# Patient Record
Sex: MALE | Race: WHITE | Employment: OTHER | URBAN - METROPOLITAN AREA
[De-identification: names, ages, dates, MRNs, and addresses within clinical notes are randomized per-mention and may not be internally consistent; named-entity substitution may affect disease eponyms.]

---

## 2016-12-27 NOTE — PATIENT INSTRUCTIONS
Preventive Health Recommendations  Male Ages 50   64    *   Call about seeing the sleep specialist: [phone].     *   Blood tests for blood sugar, kidney function, cholesterol.     *   The colonoscopy is due 2021.     *   Next step for the shoulder MRI. Let me know.     *   Follow up 3 months.     *   I'd like to see you health move in a better direction. It's time.       Yearly exam:             See your health care provider every year in order to  o   Review health changes.   o   Discuss preventive care.    o   Review your medicines if your doctor has prescribed any.     Have a cholesterol test every 5 years, or more frequently if you are at risk for high cholesterol/heart disease.     Have a diabetes test (fasting glucose) every three years. If you are at risk for diabetes, you should have this test more often.     Have a colonoscopy at age 50, or have a yearly FIT test (stool test). These exams will check for colon cancer.      Talk with your health care provider about whether or not a prostate cancer screening test (PSA) is right for you.    You should be tested each year for STDs (sexually transmitted diseases), if you re at risk.     Shots: Get a flu shot each year. Get a tetanus shot every 10 years.     Nutrition:    Eat at least 5 servings of fruits and vegetables daily.     Eat whole-grain bread, whole-wheat pasta and brown rice instead of white grains and rice.     Talk to your provider about Calcium and Vitamin D.     Lifestyle    Exercise for at least 150 minutes a week (30 minutes a day, 5 days a week). This will help you control your weight and prevent disease.     Limit alcohol to one drink per day.     No smoking.     Wear sunscreen to prevent skin cancer.     See your dentist every six months for an exam and cleaning.     See your eye doctor every 1 to 2 years.

## 2016-12-27 NOTE — PROGRESS NOTES
SUBJECTIVE:     CC: Bi Bishop is an 56 year old male who presents for preventative health visit.     Healthy Habits:  Answers for HPI/ROS submitted by the patient on 1/1/2017   Annual Exam:  Getting at least 3 servings of Calcium per day:: NO  Bi-annual eye exam:: NO  Dental care twice a year:: NO  Sleep apnea or symptoms of sleep apnea:: Daytime drowsiness, Excessive snoring, Sleep apnea  Diet:: Regular (no restrictions)  Frequency of exercise:: None  Taking medications regularly:: Yes  Medication side effects:: None  PHQ-2 Depressed: Not at all, Not at all  PHQ-2 Score: 0        Medication Followup of erectile dysfunction  Levitra 10-20mg qd PRN    Taking Medication as prescribed: yes    Side Effects:  None    Medication Helping Symptoms:  yes     Medication Followup of esophageal reflux  Omeprazole 20mg qd    Taking Medication as prescribed: yes    Side Effects:  None    Medication Helping Symptoms:  yes       Hypertension Follow-up  Amlodipine 10mg qd, losartan 100mg qd    Outpatient blood pressures are not being checked.    Low Salt Diet: not monitoring salt     Asthma Follow-Up  Advair 250-50mcg/ACT BID, albuterol 108mcg/ACT 2 puffs q6h PRN  Was ACT completed today?    Yes    ACT Total Scores 11/16/2015   ACT TOTAL SCORE -   ASTHMA ER VISITS -   ASTHMA HOSPITALIZATIONS -   ACT TOTAL SCORE (Goal Greater than or Equal to 20) 19   In the past 12 months, how many times did you visit the emergency room for your asthma without being admitted to the hospital? 0   In the past 12 months, how many times were you hospitalized overnight because of your asthma? 0       - Patient says that his knee is doing good when he limits his activities    - BP is elevated today but he didn't take his medication this morning    - His weight is also higher today, but he says he checks it at home and he has been fairly consistent at 328 pounds.      Today's PHQ-2 Score:   PHQ-2 ( 1999 [employer]) 6/3/2015 4/4/2014   Q1: Little interest  or pleasure in doing things 0 0   Q2: Feeling down, depressed or hopeless 0 0   PHQ-2 Score 0 0   Little interest or pleasure in doing things - -   Feeling down, depressed or hopeless - -   PHQ-2 Score - -       Abuse: Current or Past(Physical, Sexual or Emotional)- No  Do you feel safe in your environment - Yes    Social History   Substance Use Topics     Smoking status: Never Smoker      Smokeless tobacco: Never Used     Alcohol Use: Yes      Comment: rare     The patient does not drink >3 drinks per day nor >7 drinks per week.    Last PSA:   PSA   Date Value Ref Range Status   12/21/2009 0.66 0 - 4 ug/L Final       Recent Labs   Lab Test  06/03/15   0914  04/04/14   1115   CHOL  177  204*   HDL  34*  35*   LDL  113  134*   TRIG  150  176*   CHOLHDLRATIO  5.2*  6.0*       Reviewed orders with patient. Reviewed health maintenance and updated orders accordingly - Yes    All Histories reviewed and updated in Epic.      ROS:  C: NEGATIVE for fever, chills, change in weight  I: NEGATIVE for worrisome rashes, moles or lesions  E: NEGATIVE for vision changes or irritation  ENT: NEGATIVE for ear, mouth and throat problems  R: NEGATIVE for significant cough or SOB  CV: NEGATIVE for chest pain, palpitations or peripheral edema  GI: NEGATIVE for nausea, abdominal pain, heartburn, or change in bowel habits   male: POSITIVE for nocturia. NEGATIVE for dysuria, hematuria, decreased urinary stream, erectile dysfunction, urethral discharge  M: NEGATIVE for significant arthralgias or myalgia  N: NEGATIVE for weakness, dizziness or paresthesias  P: NEGATIVE for changes in mood or affect    This document serves as a record of the services and decisions personally performed and made by Paty Badillo MD. It was created on his behalf by Skyler Stafford, a trained medical scribe. The creation of this document is based the provider's statements to the medical scribe.  Skyler Stafford 8:13 AM January 4, 2017    Problem list, Medication  "list, Allergies, and Medical/Social/Surgical histories reviewed in [facility] and updated as appropriate.  OBJECTIVE:     /96 mmHg  Pulse 74  Ht 6' 3.75\" (1.924 m)  Wt 354 lb 2 oz (160.63 kg)  BMI 43.39 kg/m2    EXAM:  GENERAL: healthy, alert and no distress  EYES: Eyes grossly normal to inspection, conjunctivae and sclerae normal  RESP: lungs clear to auscultation - no rales, rhonchi or wheezes  CV: regular rate and rhythm, normal S1 S2, no murmur  MS: no gross musculoskeletal defects noted, no edema  NEURO: Normal strength and tone, mentation intact and speech normal  PSYCH: mentation appears normal, affect normal/bright    Results for orders placed or performed in visit on 01/04/17   Hemoglobin A1c   Result Value Ref Range    Hemoglobin A1C 8.4 (H) 4.3 - 6.0 %   Basic metabolic panel   Result Value Ref Range    Sodium 138 133 - 144 mmol/L    Potassium 3.1 (L) 3.4 - 5.3 mmol/L    Chloride 99 94 - 109 mmol/L    Carbon Dioxide 30 20 - 32 mmol/L    Anion Gap 9 3 - 14 mmol/L    Glucose 219 (H) 70 - 99 mg/dL    Urea Nitrogen 13 7 - 30 mg/dL    Creatinine 0.86 0.66 - 1.25 mg/dL    GFR Estimate >90  Non  GFR Calc   >60 mL/min/1.7m2    GFR Estimate If Black >90   GFR Calc   >60 mL/min/1.7m2    Calcium 8.5 8.5 - 10.1 mg/dL   Lipid Profile with reflex to direct LDL   Result Value Ref Range    Cholesterol 190 <200 mg/dL    Triglycerides 205 (H) <150 mg/dL    HDL Cholesterol 36 (L) >39 mg/dL    LDL Cholesterol Calculated 113 (H) <100 mg/dL    Non HDL Cholesterol 154 (H) <130 mg/dL        ASSESSMENT/PLAN:       (J45.40) Moderate persistent asthma  (primary encounter diagnosis)  Comment: Patients symptoms are well controlled with inhaler.  Plan: Asthma Action Plan (AAP)    (Z00.00) Routine general medical examination at a health care facility  Comment: Reviewed lifestyle, current conditions, medications, routine screenings, labs.  Plan: Annual physical in 1 year, sooner for other health " maintenance.     (R73.01) Elevated fasting blood sugar  Comment: Will check blood sugar today. Patient reports increased urinary frequency.    Plan: Hemoglobin A1c    (I10) Hypertension, benign essential, goal below 140/90  Comment: BP was elevated this morning. Patient did not take medications this morning. Will continue with current medications. Will check renal function.   Plan: losartan (COZAAR) 100 MG tablet, amLODIPine         (NORVASC) 10 MG tablet, Basic metabolic panel    (G47.33)Obstructive sleep apnea  Comment: Will refer to sleep specialist. Given obesity and sleeping history, high suspicion for obstructive sleep apnea.  Plan: SLEEP EVALUATION & MANAGEMENT REFERRAL - ADULT          (E66.01) Morbid obesity, unspecified obesity type (H)  Comment: Body mass index is 43.39 kg/(m^2). Stressed the importance of increase exercise, weight reduction, portion control, decreasing carbohydrates in the diet. Will refer to sleep specialist.   Plan: SLEEP EVALUATION & MANAGEMENT REFERRAL - ADULT    (Z13.6) CARDIOVASCULAR SCREENING; LDL GOAL LESS THAN 130  Comment: Routine check. Lab results pending. Primary prevention.   Plan: Lipid Profile with reflex to direct LDL    Patient Instructions     Preventive Health Recommendations  Male Ages 50 - 64    *   Call about seeing the sleep specialist: [phone].     *   Blood tests for blood sugar, kidney function, cholesterol.     *   The colonoscopy is due 2021.     *   Next step for the shoulder MRI. Let me know.     *   Follow up 3 months.     *   I'd like to see you health move in a better direction. It's time.         COUNSELING:  Reviewed preventive health counseling, as reflected in patient instructions       Regular exercise       Healthy diet/nutrition       Colon cancer screening       Prostate cancer screening         reports that he has never smoked. He has never used smokeless tobacco.    Estimated body mass index is 41.65 kg/(m^2) as calculated from the  "following:    Height as of 11/16/15: 6' 3\" (1.905 m).    Weight as of 6/3/15: 333 lb 4 oz (151.161 kg).   Weight management plan: Discussed healthy diet and exercise guidelines and patient will follow up in 12 months in clinic to re-evaluate.    Counseling Resources:  ATP IV Guidelines  Pooled Cohorts Equation Calculator  FRAX Risk Assessment  ICSI Preventive Guidelines  Dietary Guidelines for Americans, 2010  USDA's MyPlate  ASA Prophylaxis  Lung CA Screening      Addendum: Today's A1c was 8.4. New diagnosis of uncontrolled diabetes without complications. We will contact the patient, start metformin, referred for diabetic education, follow-up clinic appointment in one month.      The information in this document, created by a scribe for me, accurately reflects the services I personally performed and the decisions made by me. I have reviewed and approved this document for accuracy. 8:13 AM1/4/2017    Paty Badillo MD  [facility]    "

## 2017-01-04 ENCOUNTER — OFFICE VISIT (OUTPATIENT)
Dept: FAMILY MEDICINE | Facility: CLINIC | Age: 57
End: 2017-01-04
Payer: COMMERCIAL

## 2017-01-04 VITALS
BODY MASS INDEX: 38.36 KG/M2 | DIASTOLIC BLOOD PRESSURE: 96 MMHG | HEIGHT: 76 IN | WEIGHT: 315 LBS | SYSTOLIC BLOOD PRESSURE: 156 MMHG | HEART RATE: 74 BPM

## 2017-01-04 DIAGNOSIS — J45.40 MODERATE PERSISTENT ASTHMA WITHOUT COMPLICATION: Primary | ICD-10-CM

## 2017-01-04 DIAGNOSIS — G47.33 OBSTRUCTIVE SLEEP APNEA: ICD-10-CM

## 2017-01-04 DIAGNOSIS — R73.01 ELEVATED FASTING BLOOD SUGAR: ICD-10-CM

## 2017-01-04 DIAGNOSIS — Z13.6 CARDIOVASCULAR SCREENING; LDL GOAL LESS THAN 130: ICD-10-CM

## 2017-01-04 DIAGNOSIS — E66.01 MORBID OBESITY, UNSPECIFIED OBESITY TYPE (H): ICD-10-CM

## 2017-01-04 DIAGNOSIS — I10 HYPERTENSION, BENIGN ESSENTIAL, GOAL BELOW 140/90: ICD-10-CM

## 2017-01-04 DIAGNOSIS — Z00.00 ROUTINE GENERAL MEDICAL EXAMINATION AT A HEALTH CARE FACILITY: ICD-10-CM

## 2017-01-04 PROBLEM — Z71.89 ADVANCED DIRECTIVES, COUNSELING/DISCUSSION: Status: ACTIVE | Noted: 2017-01-04

## 2017-01-04 LAB
ANION GAP SERPL CALCULATED.3IONS-SCNC: 9 MMOL/L (ref 3–14)
BUN SERPL-MCNC: 13 MG/DL (ref 7–30)
CALCIUM SERPL-MCNC: 8.5 MG/DL (ref 8.5–10.1)
CHLORIDE SERPL-SCNC: 99 MMOL/L (ref 94–109)
CHOLEST SERPL-MCNC: 190 MG/DL
CO2 SERPL-SCNC: 30 MMOL/L (ref 20–32)
CREAT SERPL-MCNC: 0.86 MG/DL (ref 0.66–1.25)
GFR SERPL CREATININE-BSD FRML MDRD: ABNORMAL ML/MIN/1.7M2
GLUCOSE SERPL-MCNC: 219 MG/DL (ref 70–99)
HBA1C MFR BLD: 8.4 % (ref 4.3–6)
HDLC SERPL-MCNC: 36 MG/DL
LDLC SERPL CALC-MCNC: 113 MG/DL
NONHDLC SERPL-MCNC: 154 MG/DL
POTASSIUM SERPL-SCNC: 3.1 MMOL/L (ref 3.4–5.3)
SODIUM SERPL-SCNC: 138 MMOL/L (ref 133–144)
TRIGL SERPL-MCNC: 205 MG/DL

## 2017-01-04 PROCEDURE — 36415 COLL VENOUS BLD VENIPUNCTURE: CPT | Performed by: FAMILY MEDICINE

## 2017-01-04 PROCEDURE — 83036 HEMOGLOBIN GLYCOSYLATED A1C: CPT | Performed by: FAMILY MEDICINE

## 2017-01-04 PROCEDURE — 99396 PREV VISIT EST AGE 40-64: CPT | Performed by: FAMILY MEDICINE

## 2017-01-04 PROCEDURE — 99000 SPECIMEN HANDLING OFFICE-LAB: CPT | Performed by: FAMILY MEDICINE

## 2017-01-04 PROCEDURE — 80048 BASIC METABOLIC PNL TOTAL CA: CPT | Mod: 90 | Performed by: FAMILY MEDICINE

## 2017-01-04 PROCEDURE — 80061 LIPID PANEL: CPT | Mod: 90 | Performed by: FAMILY MEDICINE

## 2017-01-04 PROCEDURE — 99213 OFFICE O/P EST LOW 20 MIN: CPT | Mod: 25 | Performed by: FAMILY MEDICINE

## 2017-01-04 RX ORDER — AMLODIPINE BESYLATE 10 MG/1
10 TABLET ORAL DAILY
Qty: 90 TABLET | Refills: 3 | Status: SHIPPED | OUTPATIENT
Start: 2017-01-04 | End: 2018-03-05

## 2017-01-04 RX ORDER — LOSARTAN POTASSIUM 100 MG/1
100 TABLET ORAL DAILY
Qty: 90 TABLET | Refills: 3 | Status: SHIPPED | OUTPATIENT
Start: 2017-01-04 | End: 2018-06-07

## 2017-01-04 NOTE — MR AVS SNAPSHOT
After Visit Summary   1/4/2017    Bi Bishop    MRN: [id number]           Patient Information     Date Of Birth          1960        Visit Information        Provider Department      1/4/2017 8:00 AM Paty Badillo MD [facility]        Today's Diagnoses     Moderate persistent asthma    -  1     Routine general medical examination at a health care facility         Hypertension, benign essential, goal below 140/90         Elevated fasting blood sugar         ??Obstructive sleep apnea         Morbid obesity, unspecified obesity type (H)         CARDIOVASCULAR SCREENING; LDL GOAL LESS THAN 130           Care Instructions      Preventive Health Recommendations  Male Ages 50 - 64    *   Call about seeing the sleep specialist: [phone].     *   Blood tests for blood sugar, kidney function, cholesterol.     *   The colonoscopy is due 2021.     *   Next step for the shoulder MRI. Let me know.     *   Follow up 3 months.     *   I'd like to see you health move in a better direction. It's time.       Yearly exam:             See your health care provider every year in order to  o   Review health changes.   o   Discuss preventive care.    o   Review your medicines if your doctor has prescribed any.     Have a cholesterol test every 5 years, or more frequently if you are at risk for high cholesterol/heart disease.     Have a diabetes test (fasting glucose) every three years. If you are at risk for diabetes, you should have this test more often.     Have a colonoscopy at age 50, or have a yearly FIT test (stool test). These exams will check for colon cancer.      Talk with your health care provider about whether or not a prostate cancer screening test (PSA) is right for you.    You should be tested each year for STDs (sexually transmitted diseases), if you re at risk.     Shots: Get a flu shot each year. Get a tetanus shot every 10 years.     Nutrition:    Eat at least 5 servings  of fruits and vegetables daily.     Eat whole-grain bread, whole-wheat pasta and brown rice instead of white grains and rice.     Talk to your provider about Calcium and Vitamin D.     Lifestyle    Exercise for at least 150 minutes a week (30 minutes a day, 5 days a week). This will help you control your weight and prevent disease.     Limit alcohol to one drink per day.     No smoking.     Wear sunscreen to prevent skin cancer.     See your dentist every six months for an exam and cleaning.     See your eye doctor every 1 to 2 years.            Follow-ups after your visit        Additional Services     SLEEP EVALUATION & MANAGEMENT REFERRAL - ADULT       Please be aware that coverage of these services is subject to the terms and limitations of your health insurance plan.  Call member services at your health plan with any benefit or coverage questions.      Please bring the following to your appointment:    >>   List of current medications   >>   This referral request   >>   Any documents/labs given to you for this referral    [facility] Lab  Ph [phone] (Age 2 and up)                  Future tests that were ordered for you today     Open Future Orders        Priority Expected Expires Ordered    SLEEP EVALUATION & MANAGEMENT REFERRAL - ADULT Routine  1/4/2018 1/4/2017            Who to contact     Normal or non-critical lab and imaging results will be communicated to you by MyChart, letter or phone within 4 business days after the clinic has received the results. If you do not hear from us within 7 days, please contact the clinic through MyChart or phone. If you have a critical or abnormal lab result, we will notify you by phone as soon as possible.  Submit refill requests through [employer] or call your pharmacy and they will forward the refill request to us. Please allow 3 business days for your refill to be completed.          If you need to speak with a  for additional  "information , please call: [phone]           Additional Information About Your Visit        [facility] Information     [employer] gives you secure access to your electronic health record. If you see a primary care provider, you can also send messages to your care team and make appointments. If you have questions, please call your primary care clinic.  If you do not have a primary care provider, please call [phone] and they will assist you.        Care EveryWhere ID     This is your Care EveryWhere ID. This could be used by other organizations to access your [place] medical records  [id number]        Your Vitals Were     Pulse Height BMI (Body Mass Index)             74 6' 3.75\" (1.924 m) 43.39 kg/m2          Blood Pressure from Last 3 Encounters:   01/04/17 156/96   11/16/15 146/100   11/13/15 146/99    Weight from Last 3 Encounters:   01/04/17 354 lb 2 oz (160.63 kg)   06/03/15 333 lb 4 oz (151.161 kg)   04/04/14 330 lb (149.687 kg)              We Performed the Following     Asthma Action Plan (AAP)     Basic metabolic panel     Hemoglobin A1c     Lipid Profile with reflex to direct LDL          Where to get your medicines      These medications were sent to [place] [address] [place] [facility] [place] [id number] [place] [facility]  [address] [facility] [address]     Phone:  [phone]    - amLODIPine 10 MG tablet  - losartan 100 MG tablet       Primary Care Provider Office Phone # Fax #    Paty Badillo [phone]       [facility] [address] [place] [address]   [facility] [address]        Thank you!     Thank you for choosing [facility]  for your care. Our goal is always to provide you with excellent care. Hearing back from our patients is one way we can continue to improve our services. Please take a few minutes to complete the written survey that you may receive in the mail after your visit with us. Thank you!             Your Updated Medication List - Protect others around " you: Learn how to safely use, store and throw away your medicines at www.disposemymeds.org.          This list is accurate as of: 1/4/17  8:39 AM.  Always use your most recent med list.                   Brand Name Dispense Instructions for use    albuterol 108 (90 BASE) MCG/ACT Inhaler    PROVENTIL HFA    3 Inhaler    Inhale 2 puffs into the lungs every 6 hours as needed for shortness of breath / dyspnea       amLODIPine 10 MG tablet    NORVASC    90 tablet    Take 1 tablet (10 mg) by mouth daily       aspirin 81 MG chewable tablet      Take 81 mg by mouth daily.       cetirizine 10 MG tablet    zyrTEC     Take 10 mg by mouth daily.       FISH OIL PO      Take  by mouth 2 times daily.       fluticasone-salmeterol 250-50 MCG/DOSE diskus inhaler    ADVAIR DISKUS    180 Inhaler    Inhale 1 puff into the lungs every 12 hours       HYDROmorphone 4 MG tablet    DILAUDID    30 tablet    Take 1 tablet (4 mg) by mouth every 6 hours as needed for pain       losartan 100 MG tablet    COZAAR    90 tablet    Take 1 tablet (100 mg) by mouth daily       MULTI-VITAMIN PO      Once daily       omeprazole 20 MG CR capsule    priLOSEC     Take 20 mg by mouth daily.       vardenafil 20 MG tablet    LEVITRA    6 tablet    Take 0.5-1 tablets by mouth daily as needed for erectile dysfunction.       VITAMIN C CR PO      Take 1,000 mg by mouth daily.       vitamin D 1000 UNITS capsule      Take 1 capsule by mouth daily.

## 2017-01-04 NOTE — Clinical Note
My Asthma Action Plan  Name: Bi Bishop   YOB: 1960  Date: 1/4/2017   My doctor: Paty Badillo   My clinic: [facility]      My Control Medicine: Fluticasone+salmeterol (Advair) -  Diskus 250/50 mcg        Dose: Inhale one puff 2 times per day  My Rescue Medicine: Albuterol (Proair/Ventolin/Proventil) HFA        Dose: Inhale 2 puffs every 6 hours as needed   My Asthma Severity: moderate persistent  Avoid your asthma triggers: See enclosed list        GREEN ZONE   Good Control    I feel good    No cough or wheeze    Can work, sleep and play without asthma symptoms       Take your asthma control medicine every day.     1. If exercise triggers your asthma, take your rescue medication    15 minutes before exercise or sports, and    During exercise if you have asthma symptoms  2. Spacer to use with inhaler: If you have a spacer, make sure to use it with your inhaler             YELLOW ZONE Getting Worse  I have ANY of these:    I do not feel good    Cough or wheeze    Chest feels tight    Wake up at night   1. Keep taking your Green Zone medications  2. Start taking your rescue medicine:    every 20 minutes for up to 1 hour. Then every 4 hours for 24-48 hours.  3. If you stay in the Yellow Zone for more than 12-24 hours, contact your doctor.  4. If you do not return to the Green Zone in 12-24 hours or you get worse, start taking your oral steroid medicine if prescribed by your provider.           RED ZONE Medical Alert - Get Help  I have ANY of these:    I feel awful    Medicine is not helping    Breathing getting harder    Trouble walking or talking    Nose opens wide to breathe       1. Take your rescue medicine NOW  2. If your provider has prescribed an oral steroid medicine, start taking it NOW  3. Call your doctor NOW  4. If you are still in the Red Zone after 20 minutes and you have not reached your doctor:    Take your rescue medicine again and    Call 911 or go to the  emergency room right away    See your regular doctor within 2 weeks of an Emergency Room or Urgent Care visit for follow-up treatment.        The above medication may be given at school or day care?: Yes  Child can carry and use inhaler(s) at school with approval of school nurse?: Yes    Electronically signed by: Jaylene Hogan, January 4, 2017    Annual Reminders:  Meet with Asthma Educator,  Flu Shot in the Fall, consider Pneumonia Vaccination for patients with asthma (aged 19 and older).    Pharmacy:    [address]  [facility] [address] [place] [address]                    Asthma Triggers  How To Control Things That Make Your Asthma Worse    Triggers are things that make your asthma worse.  Look at the list below to help you find your triggers and what you can do about them.  You can help prevent asthma flare-ups by staying away from your triggers.      Trigger                                                          What you can do   Cigarette Smoke  Tobacco smoke can make asthma worse. Do not allow smoking in your home, car or around you.  Be sure no one smokes at a child s day care or school.  If you smoke, ask your health care provider for ways to help you quit.  Ask family members to quit too.  Ask your health care provider for a referral to Quit Plan to help you quit smoking, or call 9-286-064-PLAN.     Colds, Flu, Bronchitis  These are common triggers of asthma. Wash your hands often.  Don t touch your eyes, nose or mouth.  Get a flu shot every year.     Dust Mites  These are tiny bugs that live in cloth or carpet. They are too small to see. Wash sheets and blankets in hot water every week.   Encase pillows and mattress in dust mite proof covers.  Avoid having carpet if you can. If you have carpet, vacuum weekly.   Use a dust mask and HEPA vacuum.   Pollen and Outdoor Mold  Some people are allergic to trees, grass, or weed pollen, or  molds. Try to keep your windows closed.  Limit time out doors when pollen count is high.   Ask you health care provider about taking medicine during allergy season.     Animal Dander  Some people are allergic to skin flakes, urine or saliva from pets with fur or feathers. Keep pets with fur or feathers out of your home.    If you can t keep the pet outdoors, then keep the pet out of your bedroom.  Keep the bedroom door closed.  Keep pets off cloth furniture and away from stuffed toys.     Mice, Rats, and Cockroaches  Some people are allergic to the waste from these pests.   Cover food and garbage.  Clean up spills and food crumbs.  Store grease in the refrigerator.   Keep food out of the bedroom.   Indoor Mold  This can be a trigger if your home has high moisture. Fix leaking faucets, pipes, or other sources of water.   Clean moldy surfaces.  Dehumidify basement if it is damp and smelly.   Smoke, Strong Odors, and Sprays  These can reduce air quality. Stay away from strong odors and sprays, such as perfume, powder, hair spray, paints, smoke incense, paint, cleaning products, candles and new carpet.   Exercise or Sports  Some people with asthma have this trigger. Be active!  Ask your doctor about taking medicine before sports or exercise to prevent symptoms.    Warm up for 5-10 minutes before and after sports or exercise.     Other Triggers of Asthma  Cold air:  Cover your nose and mouth with a scarf.  Sometimes laughing or crying can be a trigger.  Some medicines and food can trigger asthma.

## 2017-01-04 NOTE — NURSING NOTE
"Initial /96 mmHg  Pulse 74  Ht 6' 3.75\" (1.924 m)  Wt 354 lb 2 oz (160.63 kg)  BMI 43.39 kg/m2 Estimated body mass index is 43.39 kg/(m^2) as calculated from the following:    Height as of this encounter: 6' 3.75\" (1.924 m).    Weight as of this encounter: 354 lb 2 oz (160.63 kg). .        "

## 2017-01-05 ASSESSMENT — ASTHMA QUESTIONNAIRES: ACT_TOTALSCORE: 18

## 2017-01-07 RX ORDER — METFORMIN HCL 500 MG
TABLET, EXTENDED RELEASE 24 HR ORAL
Qty: 60 TABLET | Refills: 1 | Status: SHIPPED | OUTPATIENT
Start: 2017-01-07 | End: 2017-03-07

## 2017-01-19 ENCOUNTER — ALLIED HEALTH/NURSE VISIT (OUTPATIENT)
Dept: EDUCATION SERVICES | Facility: CLINIC | Age: 57
End: 2017-01-19
Payer: COMMERCIAL

## 2017-01-19 VITALS — WEIGHT: 315 LBS | BODY MASS INDEX: 42.03 KG/M2

## 2017-01-19 PROCEDURE — G0108 DIAB MANAGE TRN  PER INDIV: HCPCS

## 2017-01-19 RX ORDER — BLOOD-GLUCOSE METER
EACH MISCELLANEOUS
Qty: 1 KIT | Refills: 0 | Status: SHIPPED | OUTPATIENT
Start: 2017-01-19 | End: 2020-11-11

## 2017-01-19 NOTE — PROGRESS NOTES
Mickey for diabetes educator to order blood glucose testing supplies (strips/lancets)    Letha Ritter RD, LD, CDE

## 2017-01-19 NOTE — PROGRESS NOTES
Diabetes Self Management Training: Initial Assessment Visit for Newly Diagnosed Patients (Complete AADE Goals Flowsheet)    Bi Bishop presents today for education related to Type 2 diabetes.    He is accompanied by self    Patient's diabetes management related comments/concerns: To get the A1c back down into the low 6s.     Patient's emotional response to diabetes: expresses readiness to learn    Patient would like this visit to be focused around the following diabetes-related behaviors and goals: Diabetes pathophysiology, Healthy Eating, Being Active and Monitoring    ASSESSMENT:  Patient Problem List and Family Medical History reviewed for relevant medical history, current medical status, and diabetes risk factors.    Current Diabetes Management per Patient:  Taking diabetes medications?   yes:     Diabetes Medication(s)     Biguanides Sig    metFORMIN (GLUCOPHAGE-XR) 500 MG 24 hr tablet Take 1 tablet daily, with food, for one week, then take 2 tablets daily.      , Oral Medications: Metformin - tolerating no problem     Past Diabetes Education: Newly diagnosed    Patient glucose self monitoring as follows: BG meter taught today.   BG meter: One Touch Verio meter    Patient's most recent A1C      8.4   1/4/2017 is not meeting goal of <7.0    Nutrition:  Patient eats 3 meals per day    Breakfast - cereal w/ milk, bagel, diet juice, banana or tangerine   Lunch - sometimes skips or turkey lunch meat and two tangerines   Dinner - white rice & chowmein OR pasta hotdish   Snacks - has limited     Beverages: Water  And coffee    Cultural/Anabaptism diet restrictions: No     Biggest Challenge to Healthy Eating: portion control    Physical Activity:    New membership to a facility.  Has been walking 40 minutes 5 x week and biking 20 minutes 5 x week and lifting 2 times.  Plans to road bike, walk and golf when weather is better.  Is enjoying working out.     Diabetes Risk Factors:  family history, age over 45 years,  "overweight/obesity and inactivity    Diabetes Complications:  Not discussed today.    Vitals:  Wt 343 lb (155.584 kg)  Estimated body mass index is 42.03 kg/(m^2) as calculated from the following:    Height as of 1/4/17: 6' 3.75\" (1.924 m).    Weight as of this encounter: 343 lb (155.584 kg).   Last 3 BP:   BP Readings from Last 3 Encounters:   01/04/17 156/96   11/16/15 146/100   11/13/15 146/99       History   Smoking status     Never Smoker    Smokeless tobacco     Never Used       Labs:  A1C      8.4   1/4/2017  GLC      219   1/4/2017  LDL      113   1/4/2017  HDL CHOLESTEROL   Date Value Ref Range Status   01/04/2017 36* >39 mg/dL Final   ]  GFR ESTIMATE   Date Value Ref Range Status   01/04/2017 >90  Non  GFR Calc   >60 mL/min/1.7m2 Final     GFR ESTIMATE IF BLACK   Date Value Ref Range Status   01/04/2017 >90   GFR Calc   >60 mL/min/1.7m2 Final     CR     0.86   1/4/2017  No results found for this basename: microalbumin    Socio/Economic Considerations:    Support system: family    Health Beliefs and Attitudes:   Patient Activation Measure Survey Score:  HIMA Score (Last Two) 8/25/2011   HIMA Raw Score 39   Activation Score 56.4   HIMA Level 3       Stage of Change: ACTION (Actively working towards change)      Diabetes knowledge and skills assessment:     Patient is knowledgeable in diabetes management concepts related to: Healthy Eating, Monitoring and Taking Medication    Patient needs further education on the following diabetes management concepts: Healthy Eating, Being Active, Monitoring, Taking Medication, Problem Solving, Reducing Risks and Healthy Coping    Barriers to Learning Assessment: No Barriers identified    Based on learning assessment above, most appropriate setting for further diabetes education would be: Individual setting.      INTERVENTION/Education provided today on:  AADE Self-Care Behaviors:  Healthy Eating: carbohydrate counting, consistency in amount, " composition, and timing of food intake, weight reduction, portion control and label reading.  Patient has made many diet changes already.  Reviewed examples of foods he commonly eats (i.e. Activia, fruits, cereal etc).    Being Active: relationship to blood glucose.  Is enjoying working out  Monitoring: purpose, proper technique, log and interpret results, individual blood glucose targets, frequency of monitoring, use of glucose control solution and proper sharps disposal  Taking Medication: action of prescribed medication  Patient was instructed on One Touch Verio meter and was able to provide an accurate return demonstration. Patient's blood glucose reading today was 120 mg/dL fasting.    Has goal of 100# weight loss.  Discussed benefits of each 5% loss on improving blood sugar control.     Opportunities for ongoing education and support in diabetes-self management were discussed.  Pt verbalized understanding of concepts discussed and recommendations provided today.       Education Materials Provided:  [place] Understanding Diabetes Booklet, BG Log Sheet and Carbohydrate Counting    PLAN:  See Patient Instructions for co-developed, patient-stated behavior change goals.  AVS printed and provided to patient today.    FOLLOW-UP:  Chart routed to referring provider.  Patient to see how many hours his high deductible plan covers and will call for further ed appts.     Ongoing plan for education and support: Follow-up with primary care provider and diabetes ed PRN as insurance covers.     Dara Roa RD, LD   Diabetes Education    Time Spent: 65 minutes  Encounter Type: Individual    Any diabetes medication dose changes were made via the CDE Protocol and Collaborative Practice Agreement with the patient's referring provider. A copy of this encounter was shared with the provider.

## 2017-01-19 NOTE — PATIENT INSTRUCTIONS
"Goals:     1.  Aim for 4-5 carb servings at meals and 0-2 carb servings at snacks    Grams of Carbohydrate \"Carb Choices\"    15 1   30 2   45 3   60 4   75 5    Total grams of carbohydrate ÷ 15  =  carb choices  Example:  39 grams of carbohydrate ÷  15 =  2.6 carb choices    2.  Exercise goal is 150 minutes per week with 2-3 strength training sessions.      3.  Continue to check your blood sugar and please bring meter and logbook with you to all doctor and follow-up appointments.     4.  Relion is the over the counter brand of meter from [employer]    ~25$ for 100 strips      [place] [facility] for the [place] Area:  For Your Diabetes Education Appointments Call:  [phone]    For Diabetes Education Related Questions:    Phone: [phone]      Dara Roa RD, LD              "

## 2017-01-19 NOTE — MR AVS SNAPSHOT
"              After Visit Summary   1/19/2017    Bi Bishop    MRN: [id number]           Patient Information     Date Of Birth          1960        Visit Information        Provider Department      1/19/2017 8:30 AM  DIABETIC ED RESOURCE [facility]        Today's Diagnoses     Uncontrolled type 2 diabetes mellitus without complication, without long-term current use of insulin (H)    -  1       Care Instructions    Goals:     1.  Aim for 4-5 carb servings at meals and 0-2 carb servings at snacks    Grams of Carbohydrate \"Carb Choices\"    15 1   30 2   45 3   60 4   75 5    Total grams of carbohydrate ÷ 15  =  carb choices  Example:  39 grams of carbohydrate ÷  15 =  2.6 carb choices    2.  Exercise goal is 150 minutes per week with 2-3 strength training sessions.      3.  Continue to check your blood sugar and please bring meter and logbook with you to all doctor and follow-up appointments.     4.  Relion is the over the counter brand of meter from [employer])    ~25$ for 100 strips      [place] [facility] for the [facility] Area:  For Your Diabetes Education Appointments Call:  [phone]    For Diabetes Education Related Questions:    Phone: [phone]      Dara Roa RD, LD                    Follow-ups after your visit        Who to contact     If you have questions or need follow up information about today's clinic visit or your schedule please contact [facility] directly at [phone].  Normal or non-critical lab and imaging results will be communicated to you by MyChart, letter or phone within 4 business days after the clinic has received the results. If you do not hear from us within 7 days, please contact the clinic through MyChart or phone. If you have a critical or abnormal lab result, we will notify you by phone as soon as possible.  Submit refill requests through "[employer] or call your pharmacy and they will " forward the refill request to us. Please allow 3 business days for your refill to be completed.          Additional Information About Your Visit        [employer] Information     [employer] gives you secure access to your electronic health record. If you see a primary care provider, you can also send messages to your care team and make appointments. If you have questions, please call your primary care clinic.  If you do not have a primary care provider, please call [phone] and they will assist you.        Care EveryWhere ID     This is your Care EveryWhere ID. This could be used by other organizations to access your [facility] medical records  [id number]         Blood Pressure from Last 3 Encounters:   01/04/17 156/96   11/16/15 146/100   11/13/15 146/99    Weight from Last 3 Encounters:   01/19/17 343 lb (155.584 kg)   01/04/17 354 lb 2 oz (160.63 kg)   06/03/15 333 lb 4 oz (151.161 kg)              Today, you had the following     No orders found for display       Primary Care Provider Office Phone # Fax #    Paty Baidllo [phone]       [facility] [address]   [place] [address]        Thank you!     Thank you for choosing [facility]  for your care. Our goal is always to provide you with excellent care. Hearing back from our patients is one way we can continue to improve our services. Please take a few minutes to complete the written survey that you may receive in the mail after your visit with us. Thank you!             Your Updated Medication List - Protect others around you: Learn how to safely use, store and throw away your medicines at www.disposemymeds.org.          This list is accurate as of: 1/19/17  9:38 AM.  Always use your most recent med list.                   Brand Name Dispense Instructions for use    albuterol 108 (90 BASE) MCG/ACT Inhaler    PROVENTIL HFA    3 Inhaler    Inhale 2 puffs into the lungs every 6 hours as needed for shortness  of breath / dyspnea       amLODIPine 10 MG tablet    NORVASC    90 tablet    Take 1 tablet (10 mg) by mouth daily       aspirin 81 MG chewable tablet      Take 81 mg by mouth daily.       cetirizine 10 MG tablet    zyrTEC     Take 10 mg by mouth daily.       FISH OIL PO      Take  by mouth 2 times daily.       fluticasone-salmeterol 250-50 MCG/DOSE diskus inhaler    ADVAIR DISKUS    180 Inhaler    Inhale 1 puff into the lungs every 12 hours       HYDROmorphone 4 MG tablet    DILAUDID    30 tablet    Take 1 tablet (4 mg) by mouth every 6 hours as needed for pain       losartan 100 MG tablet    COZAAR    90 tablet    Take 1 tablet (100 mg) by mouth daily       metFORMIN 500 MG 24 hr tablet    GLUCOPHAGE-XR    60 tablet    Take 1 tablet daily, with food, for one week, then take 2 tablets daily.       MULTI-VITAMIN PO      Once daily       omeprazole 20 MG CR capsule    priLOSEC     Take 20 mg by mouth daily.       vardenafil 20 MG tablet    LEVITRA    6 tablet    Take 0.5-1 tablets by mouth daily as needed for erectile dysfunction.       VITAMIN C CR PO      Take 1,000 mg by mouth daily.       vitamin D 1000 UNITS capsule      Take 1 capsule by mouth daily.

## 2017-01-31 ENCOUNTER — TELEPHONE (OUTPATIENT)
Dept: FAMILY MEDICINE | Facility: CLINIC | Age: 57
End: 2017-01-31

## 2017-01-31 DIAGNOSIS — R05.9 COUGH: Primary | ICD-10-CM

## 2017-01-31 RX ORDER — BENZONATATE 200 MG/1
200 CAPSULE ORAL 3 TIMES DAILY PRN
Qty: 21 CAPSULE | Refills: 0 | Status: SHIPPED | OUTPATIENT
Start: 2017-01-31 | End: 2017-03-15

## 2017-01-31 NOTE — TELEPHONE ENCOUNTER
Patient reports that he had a dry hacky cough for the past 2 weeks. He has some post nasal drip going on. Patient has been taking robitsussin, delsym, musinex and tea with honey. It is not keeping him up at night. He does not have a humidifier. Advised to continue to drink fluids, tea with honey, and could take hot steamy showers. Patient is requesting tessalon pearls.     On a side note: he is taking you seriously. He has lost #24 since he last visit. His blood sugars have been great.  Lona Amador RN

## 2017-01-31 NOTE — TELEPHONE ENCOUNTER
Pt is calling and states that he has had a very bad hackey cough for a few weeks now, no fever . He is asking if he can get tessalon pearls , please triage.     Makenna Landrum, [place]

## 2017-02-27 NOTE — TELEPHONE ENCOUNTER
Metformin ER  500mg         Last Written Prescription Date: 01/17/2017  #60 x 1  Last filled - not provided  Last Office Visit with G, P or [facility] prescribing provider:  01/04/2017 PETER Badillo    Note: requesting a  90 day supply, rx not changed       BP Readings from Last 3 Encounters:   01/04/17 (!) 156/96   11/16/15 (!) 146/100   11/13/15 (!) 146/99     Lab Results   Component Value Date    MICROL 26 06/03/2015     No results found for: MICROALBUMIN  Creatinine   Date Value Ref Range Status   01/04/2017 0.86 0.66 - 1.25 mg/dL Final   ]  GFR Estimate   Date Value Ref Range Status   01/04/2017 >90  Non  GFR Calc   >60 mL/min/1.7m2 Final   06/03/2015 85 >60 mL/min/1.7m2 Final     Comment:     Non  GFR Calc   04/04/2014 82 >60 mL/min/1.7m2 Final     GFR Estimate If Black   Date Value Ref Range Status   01/04/2017 >90   GFR Calc   >60 mL/min/1.7m2 Final   06/03/2015 >90   GFR Calc   >60 mL/min/1.7m2 Final   04/04/2014 >90 >60 mL/min/1.7m2 Final     Lab Results   Component Value Date    CHOL 190 01/04/2017     Lab Results   Component Value Date    HDL 36 01/04/2017     Lab Results   Component Value Date     01/04/2017     Lab Results   Component Value Date    TRIG 205 01/04/2017     Lab Results   Component Value Date    CHOLHDLRATIO 5.2 06/03/2015     Lab Results   Component Value Date    AST 29 12/21/2009     Lab Results   Component Value Date    ALT 24 12/21/2009     Lab Results   Component Value Date    A1C 8.4 01/04/2017    A1C 6.3 11/16/2015    A1C 6.1 06/17/2015     Potassium   Date Value Ref Range Status   01/04/2017 3.1 (L) 3.4 - 5.3 mmol/L Final

## 2017-03-01 RX ORDER — METFORMIN HCL 500 MG
TABLET, EXTENDED RELEASE 24 HR ORAL
Qty: 60 TABLET | Refills: 1 | Status: CANCELLED | OUTPATIENT
Start: 2017-03-01

## 2017-03-01 NOTE — TELEPHONE ENCOUNTER
ML for pt tocall back need to confirm dose he is taking and pt need f/ appt with Aby    Rx sent but labeled needs one month f/u  To karol new Dx of Diabetes   SHANITA Mckinnon  Clinic  RN/Boo Tavarez

## 2017-03-02 NOTE — TELEPHONE ENCOUNTER
11.57- spoke with patient  He is taking metformin 500 mg BID with meals, he has changed his diet and is exercising, has lost 44 #,  pretty consistent   Discussed advised appt  Made for next week , has enough medication till then  SHANITA Mckinnon  Clinic  RN/Boo Tavarez

## 2017-03-07 NOTE — TELEPHONE ENCOUNTER
Metformin ER  500mg         Last Written Prescription Date: 01/07/2017 #60 x 1  Last filled 02/03/2017  Last Office Visit with [facility], [facility] or  Health prescribing provider:  01/04/2017 PETER Badillo   Next 5 appointments (look out 90 days)     Mar 13, 2017 11:20 AM CDT   SHORT with Paty Badillo MD   [facility]    [address] [address] [phone]                   BP Readings from Last 3 Encounters:   01/04/17 (!) 156/96   11/16/15 (!) 146/100   11/13/15 (!) 146/99     Lab Results   Component Value Date    MICROL 26 06/03/2015     No results found for: MICROALBUMIN  Creatinine   Date Value Ref Range Status   01/04/2017 0.86 0.66 - 1.25 mg/dL Final   ]  GFR Estimate   Date Value Ref Range Status   01/04/2017 >90  Non  GFR Calc   >60 mL/min/1.7m2 Final   06/03/2015 85 >60 mL/min/1.7m2 Final     Comment:     Non  GFR Calc   04/04/2014 82 >60 mL/min/1.7m2 Final     GFR Estimate If Black   Date Value Ref Range Status   01/04/2017 >90   GFR Calc   >60 mL/min/1.7m2 Final   06/03/2015 >90   GFR Calc   >60 mL/min/1.7m2 Final   04/04/2014 >90 >60 mL/min/1.7m2 Final     Lab Results   Component Value Date    CHOL 190 01/04/2017     Lab Results   Component Value Date    HDL 36 01/04/2017     Lab Results   Component Value Date     01/04/2017     Lab Results   Component Value Date    TRIG 205 01/04/2017     Lab Results   Component Value Date    CHOLHDLRATIO 5.2 06/03/2015     Lab Results   Component Value Date    AST 29 12/21/2009     Lab Results   Component Value Date    ALT 24 12/21/2009     Lab Results   Component Value Date    A1C 8.4 01/04/2017    A1C 6.3 11/16/2015    A1C 6.1 06/17/2015     Potassium   Date Value Ref Range Status   01/04/2017 3.1 (L) 3.4 - 5.3 mmol/L Final

## 2017-03-08 RX ORDER — METFORMIN HCL 500 MG
1000 TABLET, EXTENDED RELEASE 24 HR ORAL
Qty: 60 TABLET | Refills: 1 | Status: SHIPPED | OUTPATIENT
Start: 2017-03-08 | End: 2017-03-10

## 2017-03-08 NOTE — TELEPHONE ENCOUNTER
Prescription approved per [facility] Refill Protocol or patient Primary care provider (PCP)  SHANITA Mclean RN/Boo Tavarez

## 2017-03-10 ENCOUNTER — TELEPHONE (OUTPATIENT)
Dept: FAMILY MEDICINE | Facility: CLINIC | Age: 57
End: 2017-03-10

## 2017-03-10 DIAGNOSIS — I10 HYPERTENSION, BENIGN ESSENTIAL, GOAL BELOW 140/90: Primary | ICD-10-CM

## 2017-03-10 LAB
HBA1C MFR BLD: 5.9 % (ref 4.3–6)
POTASSIUM SERPL-SCNC: 3.8 MMOL/L (ref 3.4–5.3)

## 2017-03-10 PROCEDURE — 84132 ASSAY OF SERUM POTASSIUM: CPT | Performed by: FAMILY MEDICINE

## 2017-03-10 PROCEDURE — 36415 COLL VENOUS BLD VENIPUNCTURE: CPT | Performed by: FAMILY MEDICINE

## 2017-03-10 PROCEDURE — 83036 HEMOGLOBIN GLYCOSYLATED A1C: CPT | Performed by: FAMILY MEDICINE

## 2017-03-10 RX ORDER — METFORMIN HCL 500 MG
1000 TABLET, EXTENDED RELEASE 24 HR ORAL
Qty: 180 TABLET | Refills: 0 | Status: SHIPPED | OUTPATIENT
Start: 2017-03-10 | End: 2017-06-15

## 2017-03-10 NOTE — TELEPHONE ENCOUNTER
Prescription approved per [facility] Refill Protocol.  Emma Sampson RN

## 2017-03-14 NOTE — PROGRESS NOTES
SUBJECTIVE:                                                    Bi Bishop is a 56 year old male who presents to clinic today for the following health issues:        Diabetes Follow-up  Metformin 1,000mg qd - patient is out of this medication now  Patient is checking blood sugars: once daily.  Results are as follows:         am -     Diabetic concerns: Patient is going to gym 4 days week and is lost about 44lb! He is wondering if he will need to continue metformin     Symptoms of hypoglycemia (low blood sugar): none     Paresthesias (numbness or burning in feet) or sores: Yes pain in feet after running at gym     Date of last diabetic eye exam: Over a year     Hyperlipidemia Follow-Up  Patient is not taking any current medications for this    Rate your low fat/cholesterol diet?: not monitoring fat    Taking statin?  No    Other lipid medications/supplements?:  none     Hypertension Follow-up  Losartan 100mg qd, amlodipine 10mg qd    Outpatient blood pressures are not being checked.    Low Salt Diet: not monitoring salt       Asthma Follow-Up  Advair 250-50mcg/dose BID, albuterol 108mcg/ACT 2 puffs q6h PRN  Was ACT completed today?    Yes    ACT Total Scores 1/4/2017   ACT TOTAL SCORE -   ASTHMA ER VISITS -   ASTHMA HOSPITALIZATIONS -   ACT TOTAL SCORE (Goal Greater than or Equal to 20) 18   In the past 12 months, how many times did you visit the emergency room for your asthma without being admitted to the hospital? 0   In the past 12 months, how many times were you hospitalized overnight because of your asthma? 0       Recent asthma triggers that patient is dealing with: None          Amount of exercise or physical activity: 4-5 days/week for an average of greater than 60 minutes    Problems taking medications regularly: No    Medication side effects: none  Diet: carbohydrate counting    Medication Followup of erectile dysfunction  Levitra 10-20mg qd PRN    Taking Medication as prescribed: yes    Side  "Effects:  None    Medication Helping Symptoms:  yes     Medication Followup of esophageal reflux  Prilosec 20mg qd    Taking Medication as prescribed: yes    Side Effects:  None    Medication Helping Symptoms:  yes       Problem list and histories reviewed & adjusted, as indicated.  Additional history: as documented        Reviewed and updated as needed this visit by clinical staff       Reviewed and updated as needed this visit by Provider         ROS:  C: NEGATIVE for fever, chills, change in weight  E/M: NEGATIVE for ear, mouth and throat problems  R: NEGATIVE for significant cough or SOB  CV: NEGATIVE for chest pain, palpitations or peripheral edema  GI: NEGATIVE for nausea, abdominal pain, heartburn, or change in bowel habits  MUSCULOSKELETAL: Ongoing left knee, tolerable.    OBJECTIVE:                                                    /80 (BP Location: Right arm, Patient Position: Chair, Cuff Size: Adult Large)  Pulse 80  Temp 98.6  F (37  C) (Tympanic)  Ht 6' 3.75\" (1.924 m)  Wt (!) 304 lb 2 oz (138 kg)  BMI 37.26 kg/m2  Body mass index is 37.26 kg/(m^2).   GENERAL: healthy, alert, well nourished, well hydrated, no distress  HENT: ear canals- normal; TMs- normal; Nose- normal; Mouth- no ulcers, no lesions  NECK: no tenderness, no adenopathy  RESP: lungs clear to auscultation - no rales, no rhonchi, no wheezes  CV: regular rates and rhythm  ABDOMEN: soft, no tenderness  PSY: alert, pleasant, mood and affect appropriate.   Skin: nonicterus     Diagnostic test results:  Diagnostic Test Results:  Results for orders placed or performed in visit on 03/15/17   ** Albumin Random Urine Quant FUTURE 1yr   Result Value Ref Range    Creatinine Urine 76 mg/dL    Albumin Urine mg/L 10 mg/L    Albumin Urine mg/g Cr 13.34 0 - 17 mg/g Cr        ASSESSMENT/PLAN:                                                      (E11.65) Uncontrolled type 2 diabetes mellitus without complication, without long-term current use of " "insulin (H)  (primary encounter diagnosis)  Lab Results   Component Value Date    A1C 5.9 03/10/2017    A1C 8.4 01/04/2017    A1C 6.3 11/16/2015    A1C 6.1 06/17/2015        Comment: No much improved with regular exercise and a low carbohydrate diet.  Plan: FOOT EXAM        Advisory still continue on single drug therapy, metformin. Follow-up in 6 months.    (S33.5XXA) Sprain of lumbar region, initial encounter  Comment: Intermittent usage, okay with his continued use of pain medications. He is in a regular exercise program.  Plan: HYDROmorphone (DILAUDID) 4 MG tablet        Reviewed side effects including sedation, constipation. Advised not to combine with alcohol.    (I10) Hypertension, benign essential, goal below 140/90  Comment: Excellent control. For now, continue on current medications.  Plan: If still doing well in 6 months, consider decreasing his hypertensive medications.    (J45.40) Moderate persistent asthma without complication  Comment: Well controlled, he is consistent with his controller medication.  Plan: Reviewed asthma action plan. Stressed importance of regular use of his Advair inhaler.    (M17.12) Primary osteoarthritis of left knee  Comment: Symptoms tolerable for now. Improved with weight loss.  Plan: Continue with regular low impact exercise.    (E66.01) Morbid obesity, unspecified obesity type (H)  Comment: Significant improvement.  Estimated body mass index is 37.26 kg/(m^2) as calculated from the following:    Height as of this encounter: 6' 3.75\" (1.924 m).    Weight as of this encounter: 304 lb 2 oz (138 kg).    Complete \"Weight Managment Plan\" in the progress note from the Adult Preventative or Medicare smartsets, use phrase .WEIGHTPLAN, or choose an option from Weight Management Resources smartset below.   Plan: Continue with healthy lifestyle.        *    You're doing great!    *    No change in medications.     *    Follow up in 6 months.            Ptay Badillo MD  [place] [facility]

## 2017-03-15 ENCOUNTER — OFFICE VISIT (OUTPATIENT)
Dept: FAMILY MEDICINE | Facility: CLINIC | Age: 57
End: 2017-03-15
Payer: COMMERCIAL

## 2017-03-15 VITALS
TEMPERATURE: 98.6 F | HEART RATE: 80 BPM | WEIGHT: 304.13 LBS | DIASTOLIC BLOOD PRESSURE: 80 MMHG | SYSTOLIC BLOOD PRESSURE: 126 MMHG | BODY MASS INDEX: 37.03 KG/M2 | HEIGHT: 76 IN

## 2017-03-15 DIAGNOSIS — M17.12 PRIMARY OSTEOARTHRITIS OF LEFT KNEE: ICD-10-CM

## 2017-03-15 DIAGNOSIS — J45.40 MODERATE PERSISTENT ASTHMA WITHOUT COMPLICATION: ICD-10-CM

## 2017-03-15 DIAGNOSIS — E66.01 MORBID OBESITY, UNSPECIFIED OBESITY TYPE (H): ICD-10-CM

## 2017-03-15 DIAGNOSIS — I10 HYPERTENSION, BENIGN ESSENTIAL, GOAL BELOW 140/90: ICD-10-CM

## 2017-03-15 DIAGNOSIS — S33.5XXA SPRAIN OF LUMBAR REGION, INITIAL ENCOUNTER: ICD-10-CM

## 2017-03-15 LAB
CREAT UR-MCNC: 76 MG/DL
MICROALBUMIN UR-MCNC: 10 MG/L
MICROALBUMIN/CREAT UR: 13.34 MG/G CR (ref 0–17)

## 2017-03-15 PROCEDURE — 99207 C FOOT EXAM  NO CHARGE: CPT | Performed by: FAMILY MEDICINE

## 2017-03-15 PROCEDURE — 99214 OFFICE O/P EST MOD 30 MIN: CPT | Performed by: FAMILY MEDICINE

## 2017-03-15 PROCEDURE — 82043 UR ALBUMIN QUANTITATIVE: CPT | Performed by: FAMILY MEDICINE

## 2017-03-15 RX ORDER — HYDROMORPHONE HYDROCHLORIDE 4 MG/1
4 TABLET ORAL EVERY 6 HOURS PRN
Qty: 30 TABLET | Refills: 0 | Status: SHIPPED | OUTPATIENT
Start: 2017-03-15 | End: 2017-07-13

## 2017-03-15 NOTE — MR AVS SNAPSHOT
After Visit Summary   3/15/2017    Bi Bishop    MRN: [id number]           Patient Information     Date Of Birth          1960        Visit Information        Provider Department      3/15/2017 8:40 AM Paty Badillo MD [facility]        Today's Diagnoses     Uncontrolled type 2 diabetes mellitus without complication, without long-term current use of insulin (H)    -  1    Sprain of lumbar region, initial encounter        Hypertension, benign essential, goal below 140/90          Care Instructions    *    You're doing great!    *    No change in medications.     *    Follow up in 6 months.         Follow-ups after your visit        Who to contact     Normal or non-critical lab and imaging results will be communicated to you by Iconicfuturehart, letter or phone within 4 business days after the clinic has received the results. If you do not hear from us within 7 days, please contact the clinic through Iconicfuturehart or phone. If you have a critical or abnormal lab result, we will notify you by phone as soon as possible.  Submit refill requests through Qurater or call your pharmacy and they will forward the refill request to us. Please allow 3 business days for your refill to be completed.          If you need to speak with a  for additional information , please call: [phone]           Additional Information About Your Visit        IconicfutureharWhisbi Information     Qurater gives you secure access to your electronic health record. If you see a primary care provider, you can also send messages to your care team and make appointments. If you have questions, please call your primary care clinic.  If you do not have a primary care provider, please call [phone] and they will assist you.        Care EveryWhere ID     This is your Care EveryWhere ID. This could be used by other organizations to access your [facility] medical records  [id number]        Your Vitals Were     Pulse  "Temperature Height BMI (Body Mass Index)          80 98.6  F (37  C) (Tympanic) 6' 3.75\" (1.924 m) 37.26 kg/m2         Blood Pressure from Last 3 Encounters:   03/15/17 126/80   01/04/17 (!) 156/96   11/16/15 (!) 146/100    Weight from Last 3 Encounters:   03/15/17 (!) 304 lb 2 oz (138 kg)   01/19/17 (!) 343 lb (155.6 kg)   01/04/17 (!) 354 lb 2 oz (160.6 kg)              We Performed the Following     ** Albumin Random Urine Quant FUTURE 1yr     FOOT EXAM          Today's Medication Changes          These changes are accurate as of: 3/15/17  9:22 AM.  If you have any questions, ask your nurse or doctor.               Stop taking these medicines if you haven't already. Please contact your care team if you have questions.     benzonatate 200 MG capsule   Commonly known as:  TESSALON   Stopped by:  Paty Badillo MD                Where to get your medicines      Some of these will need a paper prescription and others can be bought over the counter.  Ask your nurse if you have questions.     Bring a paper prescription for each of these medications     HYDROmorphone 4 MG tablet                Primary Care Provider Office Phone # Fax #    Paty Badillo [phone]       [facility] [address]   [place] [address]        Thank you!     Thank you for choosing [facility]  for your care. Our goal is always to provide you with excellent care. Hearing back from our patients is one way we can continue to improve our services. Please take a few minutes to complete the written survey that you may receive in the mail after your visit with us. Thank you!             Your Updated Medication List - Protect others around you: Learn how to safely use, store and throw away your medicines at www.disposemymeds.org.          This list is accurate as of: 3/15/17  9:22 AM.  Always use your most recent med list.                   Brand Name Dispense Instructions for use    albuterol " 108 (90 BASE) MCG/ACT Inhaler    PROVENTIL HFA    3 Inhaler    Inhale 2 puffs into the lungs every 6 hours as needed for shortness of breath / dyspnea       amLODIPine 10 MG tablet    NORVASC    90 tablet    Take 1 tablet (10 mg) by mouth daily       aspirin 81 MG chewable tablet      Take 81 mg by mouth daily.       blood glucose monitoring lancets     1 Box    Use to test blood sugars 1-3 times daily       blood glucose monitoring meter device kit     1 kit    Use to test blood sugars 1-3 times daily       blood glucose monitoring test strip    ONE TOUCH VERIO IQ    100 strip    Use to test blood sugar 1-3 times daily       cetirizine 10 MG tablet    zyrTEC     Take 10 mg by mouth daily.       FISH OIL PO      Take  by mouth 2 times daily.       fluticasone-salmeterol 250-50 MCG/DOSE diskus inhaler    ADVAIR DISKUS    180 Inhaler    Inhale 1 puff into the lungs every 12 hours       HYDROmorphone 4 MG tablet    DILAUDID    30 tablet    Take 1 tablet (4 mg) by mouth every 6 hours as needed for pain       losartan 100 MG tablet    COZAAR    90 tablet    Take 1 tablet (100 mg) by mouth daily       metFORMIN 500 MG 24 hr tablet    GLUCOPHAGE-XR    180 tablet    Take 2 tablets (1,000 mg) by mouth daily (with dinner)       MULTI-VITAMIN PO      Once daily       omeprazole 20 MG CR capsule    priLOSEC     Take 20 mg by mouth daily.       vardenafil 20 MG tablet    LEVITRA    6 tablet    Take 0.5-1 tablets by mouth daily as needed for erectile dysfunction.       VITAMIN C CR PO      Take 1,000 mg by mouth daily.       vitamin D 1000 UNITS capsule      Take 1 capsule by mouth daily.

## 2017-03-15 NOTE — NURSING NOTE
"Chief Complaint   Patient presents with     Diabetes       Initial /80 (BP Location: Right arm, Patient Position: Chair, Cuff Size: Adult Large)  Pulse 80  Temp 98.6  F (37  C) (Tympanic)  Ht 6' 3.75\" (1.924 m)  Wt (!) 304 lb 2 oz (138 kg)  BMI 37.26 kg/m2 Estimated body mass index is 37.26 kg/(m^2) as calculated from the following:    Height as of this encounter: 6' 3.75\" (1.924 m).    Weight as of this encounter: 304 lb 2 oz (138 kg).  Medication Reconciliation: complete    "

## 2017-03-16 PROBLEM — E78.5 HYPERLIPIDEMIA LDL GOAL <100: Status: ACTIVE | Noted: 2017-03-16

## 2017-04-10 NOTE — TELEPHONE ENCOUNTER
OneTouch Renee test strips      Last Written Prescription Date: 01/19/2017 #100 x 3  Last filled - not provided  Last Office Visit with [facility], [facility] or [facility] prescribing provider: 03/15/2017 PETER Badillo    Note: Requesting a 90 day supply, not adjusted in this request

## 2017-04-11 NOTE — TELEPHONE ENCOUNTER
Prescription approved per [facility] Refill Protocol or patient Primary care provider (PCP)  SHANITA Mclean RN/Boo Tavarez

## 2017-04-25 ENCOUNTER — TRANSFERRED RECORDS (OUTPATIENT)
Dept: HEALTH INFORMATION MANAGEMENT | Facility: CLINIC | Age: 57
End: 2017-04-25

## 2017-06-15 NOTE — TELEPHONE ENCOUNTER
Metformin ER 500mg         Last Written Prescription Date: 03/10/2017 #180 x 0  Last filled 03/10/2017  Last Office Visit with [facility], [facility] or  [facility] prescribing provider:  03/15/2017 PETER Badillo        BP Readings from Last 3 Encounters:   03/15/17 126/80   01/04/17 (!) 156/96   11/16/15 (!) 146/100     Lab Results   Component Value Date    MICROL 10 03/15/2017     Lab Results   Component Value Date    UMALCR 13.34 03/15/2017     Creatinine   Date Value Ref Range Status   01/04/2017 0.86 0.66 - 1.25 mg/dL Final   ]  GFR Estimate   Date Value Ref Range Status   01/04/2017 >90  Non  GFR Calc   >60 mL/min/1.7m2 Final   06/03/2015 85 >60 mL/min/1.7m2 Final     Comment:     Non  GFR Calc   04/04/2014 82 >60 mL/min/1.7m2 Final     GFR Estimate If Black   Date Value Ref Range Status   01/04/2017 >90   GFR Calc   >60 mL/min/1.7m2 Final   06/03/2015 >90   GFR Calc   >60 mL/min/1.7m2 Final   04/04/2014 >90 >60 mL/min/1.7m2 Final     Lab Results   Component Value Date    CHOL 190 01/04/2017     Lab Results   Component Value Date    HDL 36 01/04/2017     Lab Results   Component Value Date     01/04/2017     Lab Results   Component Value Date    TRIG 205 01/04/2017     Lab Results   Component Value Date    CHOLHDLRATIO 5.2 06/03/2015     Lab Results   Component Value Date    AST 29 12/21/2009     Lab Results   Component Value Date    ALT 24 12/21/2009     Lab Results   Component Value Date    A1C 5.9 03/10/2017    A1C 8.4 01/04/2017    A1C 6.3 11/16/2015    A1C 6.1 06/17/2015     Potassium   Date Value Ref Range Status   03/10/2017 3.8 3.4 - 5.3 mmol/L Final

## 2017-06-16 RX ORDER — METFORMIN HCL 500 MG
TABLET, EXTENDED RELEASE 24 HR ORAL
Qty: 180 TABLET | Refills: 2 | Status: SHIPPED | OUTPATIENT
Start: 2017-06-16 | End: 2018-03-05

## 2017-06-16 NOTE — TELEPHONE ENCOUNTER
Prescription approved per [facility] Refill Protocol or patient Primary care provider (PCP)  SHANITA Mclean RN/Boo Tavarez

## 2017-07-10 ENCOUNTER — TELEPHONE (OUTPATIENT)
Dept: FAMILY MEDICINE | Facility: CLINIC | Age: 57
End: 2017-07-10

## 2017-07-10 DIAGNOSIS — J45.40 MODERATE PERSISTENT ASTHMA WITHOUT COMPLICATION: ICD-10-CM

## 2017-07-10 RX ORDER — ALBUTEROL SULFATE 90 UG/1
2 AEROSOL, METERED RESPIRATORY (INHALATION) EVERY 6 HOURS PRN
Qty: 3 INHALER | Refills: 2 | Status: SHIPPED | OUTPATIENT
Start: 2017-07-10 | End: 2017-07-12

## 2017-07-10 NOTE — TELEPHONE ENCOUNTER
Pt called requesting refills be authorized for Advair and Albuterol.  Pt uses [facility]. When faxing include client #[id number].  Fax# is [phone].      Thank you.    Jennifer Jones, [place]

## 2017-07-12 RX ORDER — ALBUTEROL SULFATE 90 UG/1
2 AEROSOL, METERED RESPIRATORY (INHALATION) EVERY 6 HOURS PRN
Qty: 3 INHALER | Refills: 3 | Status: SHIPPED | OUTPATIENT
Start: 2017-07-12 | End: 2021-12-02

## 2017-07-12 NOTE — TELEPHONE ENCOUNTER
Routed to [facility]  [facility], to address appear Rx were printed already ,   Thanks   SHANITA Mclean  RN/Boo Tavarez

## 2017-07-13 DIAGNOSIS — S33.5XXA SPRAIN OF LUMBAR REGION, INITIAL ENCOUNTER: ICD-10-CM

## 2017-07-13 RX ORDER — HYDROMORPHONE HYDROCHLORIDE 4 MG/1
4 TABLET ORAL EVERY 6 HOURS PRN
Qty: 30 TABLET | Refills: 0 | Status: SHIPPED | OUTPATIENT
Start: 2017-07-13 | End: 2017-07-18

## 2017-07-18 DIAGNOSIS — S33.5XXA SPRAIN OF LUMBAR REGION, INITIAL ENCOUNTER: ICD-10-CM

## 2017-07-18 RX ORDER — HYDROMORPHONE HYDROCHLORIDE 4 MG/1
4 TABLET ORAL EVERY 6 HOURS PRN
Qty: 30 TABLET | Refills: 0 | Status: SHIPPED | OUTPATIENT
Start: 2017-07-18 | End: 2017-11-28

## 2017-11-28 DIAGNOSIS — S33.5XXA SPRAIN OF LUMBAR REGION, INITIAL ENCOUNTER: ICD-10-CM

## 2017-11-28 DIAGNOSIS — I10 HYPERTENSION, BENIGN ESSENTIAL, GOAL BELOW 140/90: ICD-10-CM

## 2017-11-28 NOTE — TELEPHONE ENCOUNTER
Reason for Call:  Medication or medication refill:    Do you use a [facility]?  Name of the pharmacy and phone number for the current request:  Patient will  at .    Name of the medication requested: dilaudid    Other request:     Can we leave a detailed message on this number? YES    Phone number patient can be reached at: Home number on file [phone] (home)    Best Time:     Call taken on 11/28/2017 at 2:16 PM by Soumya Hurtado

## 2017-11-29 RX ORDER — HYDROMORPHONE HYDROCHLORIDE 4 MG/1
4 TABLET ORAL EVERY 6 HOURS PRN
Qty: 30 TABLET | Refills: 0 | Status: SHIPPED | OUTPATIENT
Start: 2017-11-29 | End: 2018-05-14

## 2017-12-08 DIAGNOSIS — I10 HYPERTENSION, BENIGN ESSENTIAL, GOAL BELOW 140/90: ICD-10-CM

## 2017-12-08 RX ORDER — LOSARTAN POTASSIUM 100 MG/1
TABLET ORAL
Qty: 30 TABLET | Refills: 0 | Status: SHIPPED | OUTPATIENT
Start: 2017-12-08 | End: 2018-01-05

## 2017-12-08 NOTE — TELEPHONE ENCOUNTER
Left a message for pt that he is due for his 6 month follow-up appointment.  Will provide short-term fill with note.  Sunita FUNK RN

## 2018-01-05 DIAGNOSIS — I10 HYPERTENSION, BENIGN ESSENTIAL, GOAL BELOW 140/90: ICD-10-CM

## 2018-01-05 NOTE — TELEPHONE ENCOUNTER
Requested Prescriptions   Pending Prescriptions Disp Refills     losartartan (COZAAR) 100 MG tablet [Pharmacy Med Name: LOSARTAN POTASSIUM 100 MG TAB] 30 tablet 0    Last Written Prescription Date:  12/08/2017  #30 x 0  Last filled 12/08/2017  Last Office Visit with [facility], [facility] or [facility] prescribing provider:  03/15/2017 PETER Badillo   Future Office Visit: none   Sig: TAKE 1 TABLET (100 MG) BY MOUTH DAILY. NEED APPT    Angiotensin-II Receptors Failed    1/5/2018 12:55 AM       Failed - Normal serum creatinine on file in past 12 months    Recent Labs   Lab Test  01/04/17   0844   CR  0.86            Passed - Blood pressure under 140/90 in past 12 months.    BP Readings from Last 3 Encounters:   03/15/17 126/80   01/04/17 (!) 156/96   11/16/15 (!) 146/100                Passed - Recent or future visit with authorizing provider's specialty    Patient had office visit in the last year or has a visit in the next 30 days with authorizing provider.  See chart review.              Passed - Patient is age 18 or older       Passed - Normal serum potassium on file in past 12 months    Recent Labs   Lab Test  03/10/17   1104   POTASSIUM  3.8

## 2018-01-08 RX ORDER — LOSARTAN POTASSIUM 100 MG/1
100 TABLET ORAL DAILY
Qty: 90 TABLET | Refills: 0 | Status: SHIPPED | OUTPATIENT
Start: 2018-01-08 | End: 2018-04-13

## 2018-01-08 NOTE — TELEPHONE ENCOUNTER
Prescription approved per [facility] Refill Protocol or patient Primary care provider (PCP)  SHANITA Mclean RN/Boo Tavarez

## 2018-03-05 DIAGNOSIS — I10 HYPERTENSION, BENIGN ESSENTIAL, GOAL BELOW 140/90: ICD-10-CM

## 2018-03-05 DIAGNOSIS — E78.5 HYPERLIPIDEMIA LDL GOAL <100: ICD-10-CM

## 2018-03-06 RX ORDER — AMLODIPINE BESYLATE 10 MG/1
TABLET ORAL
Qty: 90 TABLET | Refills: 0 | Status: SHIPPED | OUTPATIENT
Start: 2018-03-06 | End: 2018-06-01

## 2018-03-06 RX ORDER — SIMVASTATIN 20 MG
TABLET ORAL
Qty: 90 TABLET | Refills: 0 | Status: SHIPPED | OUTPATIENT
Start: 2018-03-06 | End: 2018-05-31

## 2018-03-06 RX ORDER — METFORMIN HCL 500 MG
TABLET, EXTENDED RELEASE 24 HR ORAL
Qty: 180 TABLET | Refills: 0 | Status: SHIPPED | OUTPATIENT
Start: 2018-03-06 | End: 2018-05-31

## 2018-03-06 NOTE — TELEPHONE ENCOUNTER
Medication is being filled for 1 time refill only due to:   Over due for office visit and/or labs   SHANITA Mclean  RN/Boo Tavarez

## 2018-03-13 ENCOUNTER — TELEPHONE (OUTPATIENT)
Dept: FAMILY MEDICINE | Facility: CLINIC | Age: 58
End: 2018-03-13

## 2018-03-13 DIAGNOSIS — H10.023 PINK EYE, BILATERAL: Primary | ICD-10-CM

## 2018-03-13 RX ORDER — GENTAMICIN SULFATE 3 MG/ML
1 SOLUTION/ DROPS OPHTHALMIC EVERY 4 HOURS
Qty: 5 ML | Refills: 0 | Status: SHIPPED | OUTPATIENT
Start: 2018-03-13 | End: 2018-03-20

## 2018-03-13 NOTE — TELEPHONE ENCOUNTER
Pt is calling and states he thinks he has pink eye  And has tried OTC eye drops and hot and cold compresses, he is asking if he can just get an  RX eye drop sent to his pharmacy.     Makenna Landrum, [facility] [place]

## 2018-03-13 NOTE — TELEPHONE ENCOUNTER
Per Dr. Badillo: ok to order gentamycin eye drops. 1 drop every 4 hours for 7 days. Script ordered. Left message on answering machine to call the clinic RN.  Lona Amador RN

## 2018-03-13 NOTE — TELEPHONE ENCOUNTER
Pt advised drops were filled and sent to pharmacy, if you still need to talk to him call him back otherwise please close encounter.     /Makenna Landrum, [place]

## 2018-03-13 NOTE — TELEPHONE ENCOUNTER
Patient reports that he believes he has pink eye in both eyes. He has had symptoms for the past 7 days. Both eyes are watering, mattering, itchy and the whites of his eyes are pink. There are no openings today. He is ok with a telephone visit. Explained costs. Please advise if you would like a telephone visit, office visit or prescription. Thank you.  Lona Amador RN

## 2018-04-13 DIAGNOSIS — I10 HYPERTENSION, BENIGN ESSENTIAL, GOAL BELOW 140/90: ICD-10-CM

## 2018-04-13 NOTE — TELEPHONE ENCOUNTER
"Requested Prescriptions   Pending Prescriptions Disp Refills     losartan (COZAAR) 100 MG tablet [Pharmacy Med Name: LOSARTAN POTASSIUM 100 MG TAB] 90 tablet 0    Last Written Prescription Date:  01/04/2017 #90 x 3  Last filled 01/08/2018  Last office visit: 3/15/2017 PETER Badillo   Future Office Visit: None    Sig: TAKE 1 TABLET (100 MG) BY MOUTH DAILY OVER DUE FOR 6 MONTH F/U PLZ MAKE APPT NOW JAN2018    Angiotensin-II Receptors Failed    4/13/2018 10:23 AM       Failed - Blood pressure under 140/90 in past 12 months    BP Readings from Last 3 Encounters:   03/15/17 126/80   01/04/17 (!) 156/96   11/16/15 (!) 146/100                Failed - Recent (12 mo) or future (30 days) visit within the authorizing provider's specialty    Patient had office visit in the last 12 months or has a visit in the next 30 days with authorizing provider or within the authorizing provider's specialty.  See \"Patient Info\" tab in inbasket, or \"Choose Columns\" in Meds & Orders section of the refill encounter.           Failed - Normal serum creatinine on file in past 12 months    Recent Labs   Lab Test  01/04/17   0844   CR  0.86            Failed - Normal serum potassium on file in past 12 months    Recent Labs   Lab Test  03/10/17   1104   POTASSIUM  3.8                   Passed - Patient is age 18 or older          "

## 2018-04-16 RX ORDER — LOSARTAN POTASSIUM 100 MG/1
100 TABLET ORAL DAILY
Qty: 30 TABLET | Refills: 0 | Status: SHIPPED | OUTPATIENT
Start: 2018-04-16 | End: 2018-05-11

## 2018-04-25 ENCOUNTER — MYC MEDICAL ADVICE (OUTPATIENT)
Dept: FAMILY MEDICINE | Facility: CLINIC | Age: 58
End: 2018-04-25

## 2018-04-25 DIAGNOSIS — J45.40 MODERATE PERSISTENT ASTHMA WITHOUT COMPLICATION: ICD-10-CM

## 2018-04-25 DIAGNOSIS — E78.5 HYPERLIPIDEMIA LDL GOAL <100: ICD-10-CM

## 2018-04-26 NOTE — TELEPHONE ENCOUNTER
He hasnt been seen in clinic for over a year so we cannot refill his meds as nurses per protocol.Emma Sampson RN

## 2018-04-30 NOTE — TELEPHONE ENCOUNTER
Please fax the Advair Rx to the [place] pharmacy list below.     Pt called requesting refills be authorized for Advair and Albuterol.  Pt uses [facility]. When faxing include client #[id number].  Fax# is [phone].       Thank you.

## 2018-05-11 DIAGNOSIS — I10 HYPERTENSION, BENIGN ESSENTIAL, GOAL BELOW 140/90: ICD-10-CM

## 2018-05-11 RX ORDER — LOSARTAN POTASSIUM 100 MG/1
100 TABLET ORAL DAILY
Qty: 30 TABLET | Refills: 0 | Status: SHIPPED | OUTPATIENT
Start: 2018-05-11 | End: 2018-06-04

## 2018-05-11 NOTE — TELEPHONE ENCOUNTER
Routing refill request to provider for review/approval because:  Patient needs to be seen because it has been more than 1 year since last office visit.    Vania Plata RN

## 2018-05-11 NOTE — TELEPHONE ENCOUNTER
"Requested Prescriptions   Pending Prescriptions Disp Refills     losartan (COZAAR) 100 MG tablet 30 tablet 0    Last Written Prescription Date:  04/16/2018 #30 x 0  Last filled - not provided  Last office visit: 3/15/2017 PETER Badillo    Note: Requesting a 90 day supply     Future Office Visit:   Next 5 appointments (look out 90 days)     May 30, 2018 10:00 AM CDT   [facility]    [address] [address] [phone]                Sig: Take 1 tablet (100 mg) by mouth daily (Needs follow-up appointment for this medication)    Angiotensin-II Receptors Failed    5/11/2018  7:55 AM       Failed - Blood pressure under 140/90 in past 12 months    BP Readings from Last 3 Encounters:   03/15/17 126/80   01/04/17 (!) 156/96   11/16/15 (!) 146/100                Failed - Normal serum creatinine on file in past 12 months    Recent Labs   Lab Test  01/04/17   0844   CR  0.86            Failed - Normal serum potassium on file in past 12 months    Recent Labs   Lab Test  03/10/17   1104   POTASSIUM  3.8                   Passed - Recent (12 mo) or future (30 days) visit within the authorizing provider's specialty    Patient had office visit in the last 12 months or has a visit in the next 30 days with authorizing provider or within the authorizing provider's specialty.  See \"Patient Info\" tab in inbasket, or \"Choose Columns\" in Meds & Orders section of the refill encounter.           Passed - Patient is age 18 or older          "

## 2018-05-14 DIAGNOSIS — S33.5XXA SPRAIN OF LUMBAR REGION, INITIAL ENCOUNTER: ICD-10-CM

## 2018-05-14 RX ORDER — HYDROMORPHONE HYDROCHLORIDE 4 MG/1
4 TABLET ORAL EVERY 6 HOURS PRN
Qty: 15 TABLET | Refills: 0 | Status: SHIPPED | OUTPATIENT
Start: 2018-05-14 | End: 2018-07-09

## 2018-05-14 NOTE — TELEPHONE ENCOUNTER
Patient is requesting a refill on his Hydromorphone and will  at .    Evie Hurtado, [facility]

## 2018-05-24 DIAGNOSIS — E78.5 HYPERLIPIDEMIA LDL GOAL <100: ICD-10-CM

## 2018-05-24 LAB
ANION GAP SERPL CALCULATED.3IONS-SCNC: 5 MMOL/L (ref 3–14)
BUN SERPL-MCNC: 14 MG/DL (ref 7–30)
CALCIUM SERPL-MCNC: 8.2 MG/DL (ref 8.5–10.1)
CHLORIDE SERPL-SCNC: 105 MMOL/L (ref 94–109)
CHOLEST SERPL-MCNC: 144 MG/DL
CO2 SERPL-SCNC: 32 MMOL/L (ref 20–32)
CREAT SERPL-MCNC: 0.85 MG/DL (ref 0.66–1.25)
GFR SERPL CREATININE-BSD FRML MDRD: >90 ML/MIN/1.7M2
GLUCOSE SERPL-MCNC: 110 MG/DL (ref 70–99)
HBA1C MFR BLD: 5.6 % (ref 0–5.6)
HDLC SERPL-MCNC: 36 MG/DL
LDLC SERPL CALC-MCNC: 82 MG/DL
NONHDLC SERPL-MCNC: 108 MG/DL
POTASSIUM SERPL-SCNC: 3.1 MMOL/L (ref 3.4–5.3)
SODIUM SERPL-SCNC: 142 MMOL/L (ref 133–144)
TRIGL SERPL-MCNC: 129 MG/DL
TSH SERPL DL<=0.005 MIU/L-ACNC: 1.14 MU/L (ref 0.4–4)

## 2018-05-24 PROCEDURE — 83036 HEMOGLOBIN GLYCOSYLATED A1C: CPT | Performed by: FAMILY MEDICINE

## 2018-05-24 PROCEDURE — 84443 ASSAY THYROID STIM HORMONE: CPT | Performed by: FAMILY MEDICINE

## 2018-05-24 PROCEDURE — 80061 LIPID PANEL: CPT | Performed by: FAMILY MEDICINE

## 2018-05-24 PROCEDURE — 80048 BASIC METABOLIC PNL TOTAL CA: CPT | Performed by: FAMILY MEDICINE

## 2018-05-24 PROCEDURE — 36415 COLL VENOUS BLD VENIPUNCTURE: CPT | Performed by: FAMILY MEDICINE

## 2018-05-25 NOTE — PATIENT INSTRUCTIONS
"  Preventive Health Recommendations  Male Ages 50   64      *   Overall, you're doing well.     *   Eye exam every 2 years.     *   No change in medications.     *    I'm happy with your health.     *   Follow up in one year. Will need an A1c in 6 months. This would be a \"lab only\" appointment, no need for an office visit. Please call our clinic phone # to set this up.      Yearly exam:             See your health care provider every year in order to  o   Review health changes.   o   Discuss preventive care.    o   Review your medicines if your doctor has prescribed any.     Have a cholesterol test every 5 years, or more frequently if you are at risk for high cholesterol/heart disease.     Have a diabetes test (fasting glucose) every three years. If you are at risk for diabetes, you should have this test more often.     Have a colonoscopy at age 50, or have a yearly FIT test (stool test). These exams will check for colon cancer.      Talk with your health care provider about whether or not a prostate cancer screening test (PSA) is right for you.    You should be tested each year for STDs (sexually transmitted diseases), if you re at risk.     Shots: Get a flu shot each year. Get a tetanus shot every 10 years.     Nutrition:    Eat at least 5 servings of fruits and vegetables daily.     Eat whole-grain bread, whole-wheat pasta and brown rice instead of white grains and rice.     Talk to your provider about Calcium and Vitamin D.     Lifestyle    Exercise for at least 150 minutes a week (30 minutes a day, 5 days a week). This will help you control your weight and prevent disease.     Limit alcohol to one drink per day.     No smoking.     Wear sunscreen to prevent skin cancer.     See your dentist every six months for an exam and cleaning.     See your eye doctor every 1 to 2 years.    "

## 2018-05-25 NOTE — PROGRESS NOTES
SUBJECTIVE:   CC: Bi Bishop is an 57 year old male who presents for preventative health visit.     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    - Patient says that his right shoulder continues causing discomfort. Patient says he continues exercising the shoulder.     Healthy Habits:    Do you get at least three servings of calcium containing foods daily (dairy, green leafy vegetables, etc.)? NO    Amount of exercise or daily activities, outside of work: 2-3 day(s) per week    Problems taking medications regularly No    Medication side effects: No    Have you had an eye exam in the past two years? no    Do you see a dentist twice per year? yes    Do you have sleep apnea, excessive snoring or daytime drowsiness?no       Medication Followup of erectile dysfunction  Levitra 10-20mg prn    Taking Medication as prescribed: yes    Side Effects:  None    Medication Helping Symptoms:  yes     Diabetes Follow-up  Metformin 1,000mg qd  Patient is checking blood sugars: once daily.  Results are as follows:         am - 108-112    Diabetic concerns: None     Symptoms of hypoglycemia (low blood sugar): none     Paresthesias (numbness or burning in feet) or sores: No     Date of last diabetic eye exam: Over a year    Hyperlipidemia Follow-Up  Simvastatin 20mg qd    Rate your low fat/cholesterol diet?: not monitoring fat    Taking statin?  Yes, no muscle aches from statin    Other lipid medications/supplements?:  none    Hypertension Follow-up  Losartan 100mg qd, amlodipine 10mg qd    Outpatient blood pressures are not being checked.    Low Salt Diet: not monitoring salt    BP Readings from Last 2 Encounters:   06/04/18 120/82   03/15/17 126/80     Hemoglobin A1C (%)   Date Value   05/24/2018 5.6   03/10/2017 5.9     LDL Cholesterol Calculated (mg/dL)   Date Value   05/24/2018 82   01/04/2017 113 (H)     Asthma Follow-Up  Advair 250-50mcg/dose bid, albuterol 108mcg/ACT 2 puffs q6h prn    Was ACT completed today?    Yes     ACT Total Scores 6/4/2018   ACT TOTAL SCORE -   ASTHMA ER VISITS -   ASTHMA HOSPITALIZATIONS -   ACT TOTAL SCORE (Goal Greater than or Equal to 20) 25   In the past 12 months, how many times did you visit the emergency room for your asthma without being admitted to the hospital? 0   In the past 12 months, how many times were you hospitalized overnight because of your asthma? 0       Recent asthma triggers that patient is dealing with: None        - Normal colonoscopy 5/26/2011. No family history of colorectal cancer in first-degree relative. On q 10 year colonoscopy schedule.    Today's PHQ-2 Score:   PHQ-2 ( 1999 [employer]) 6/4/2018 3/15/2017   Q1: Little interest or pleasure in doing things 0 0   Q2: Feeling down, depressed or hopeless 0 0   PHQ-2 Score 0 0   Q1: Little interest or pleasure in doing things - -   Q2: Feeling down, depressed or hopeless - -   PHQ-2 Score - -       Abuse: Current or Past(Physical, Sexual or Emotional)- No  Do you feel safe in your environment - Yes    Social History   Substance Use Topics     Smoking status: Never Smoker     Smokeless tobacco: Never Used     Alcohol use Yes      Comment: rare      If you drink alcohol do you typically have >3 drinks per day or >7 drinks per week? No                      Last PSA:   PSA   Date Value Ref Range Status   12/21/2009 0.66 0 - 4 ug/L Final       Reviewed orders with patient. Reviewed health maintenance and updated orders accordingly - Yes  Labs reviewed in EPIC    Reviewed and updated as needed this visit by clinical staff  Tobacco  Allergies  Meds  Med Hx  Surg Hx  Fam Hx  Soc Hx        Reviewed and updated as needed this visit by Provider            ROS:  CONSTITUTIONAL: NEGATIVE for fever, chills, change in weight  INTEGUMENTARY/SKIN: NEGATIVE for worrisome rashes, moles or lesions  EYES: NEGATIVE for vision changes or irritation  ENT: NEGATIVE for ear, mouth and throat problems  RESP: NEGATIVE for significant cough or SOB  CV:  "NEGATIVE for chest pain, palpitations or peripheral edema  GI: NEGATIVE for nausea, abdominal pain, heartburn, or change in bowel habits   male: negative for dysuria, hematuria, decreased urinary stream, erectile dysfunction, urethral discharge  MUSCULOSKELETAL: NEGATIVE for significant arthralgias or myalgia  NEURO: NEGATIVE for weakness, dizziness or paresthesias  PSYCHIATRIC: NEGATIVE for changes in mood or affect    This document serves as a record of the services and decisions personally performed and made by Paty Badillo MD. It was created on his behalf by Skyler Stafford, a trained medical scribe. The creation of this document is based the provider's statements to the medical scribe.  Skyler Stafford 9:22 AM June 4, 2018  OBJECTIVE:   /82  Pulse 74  Ht 6' 3.75\" (1.924 m)  Wt 322 lb 6 oz (146.2 kg)  BMI 39.5 kg/m2     EXAM:  GENERAL: healthy, alert and no distress  EYES: Eyes grossly normal to inspection, conjunctivae and sclerae normal  RESP: lungs clear to auscultation - no rales, rhonchi or wheezes  CV: regular rate and rhythm, normal S1 S2, no murmur  ABDOMEN: soft, nontender  MS: no gross musculoskeletal defects noted, no edema  SKIN: no suspicious lesions or rashes  NEURO: Normal strength and tone, mentation intact and speech normal  PSYCH: mentation appears normal, affect normal/bright      Lab Results   Component Value Date    A1C 5.6 05/24/2018    A1C 5.9 03/10/2017    A1C 8.4 01/04/2017    A1C 6.3 11/16/2015    A1C 6.1 06/17/2015     ASSESSMENT/PLAN:     (Z00.00) Routine general medical examination at a health care facility  (primary encounter diagnosis)  Comment: Reviewed lifestyle, current conditions, medications, routine screenings, labs.  Plan: Annual physical in 1 year, sooner for other health maintenance.     (E11.65) Uncontrolled type 2 diabetes mellitus without complication, without long-term current use of insulin (H)  Comment: A1c was 5.6 on 5/24/2018. Continue with metformin. " "  Plan: FOOT EXAM, metFORMIN (GLUCOPHAGE-XR) 500 MG 24         hr tablet          (J45.40) Moderate persistent asthma  Comment: Controlled with bronchodilator use PRN.   Plan: Asthma Action Plan (AAP)          (E87.6) Hypokalemia  Comment: Low potassium levels, etiology unknown. Will check renal function, results pending.  Plan: Basic metabolic panel    (E78.5) Hyperlipidemia LDL goal <100  Comment: Patient is tolerating moderate intensity statin therapy. Primary prevention. Medication refilled.   Plan: simvastatin (ZOCOR) 20 MG tablet    (E66.9,  Z68.39) Class 2 obesity with serious comorbidity and body mass index (BMI) of 39.0 to 39.9 in adult, unspecified obesity type  Comment: BMI 39.50. Reviewed lifestyle modifications including healthy eating and regular exercise.   Plan: Advised to monitor.       Patient Instructions     Preventive Health Recommendations  Male Ages 50 - 64      *   Overall, you're doing well.     *   Eye exam every 2 years.     *   No change in medications.     *    I'm happy with your health.     *   Follow up in one year. Will need an A1c in 6 months. This would be a \"lab only\" appointment, no need for an office visit. Please call our clinic phone # to set this up.          COUNSELING:  Reviewed preventive health counseling, as reflected in patient instructions       Regular exercise       Healthy diet/nutrition       Vision screening       reports that he has never smoked. He has never used smokeless tobacco.    Estimated body mass index is 39.5 kg/(m^2) as calculated from the following:    Height as of this encounter: 6' 3.75\" (1.924 m).    Weight as of this encounter: 322 lb 6 oz (146.2 kg).   Weight management plan: Discussed healthy diet and exercise guidelines and patient will follow up in 12 months in clinic to re-evaluate.    Counseling Resources:  ATP IV Guidelines  Pooled Cohorts Equation Calculator  FRAX Risk Assessment  ICSI Preventive Guidelines  Dietary Guidelines for " Americans, 2010  USDA's MyPlate  ASA Prophylaxis  Lung CA Screening    The information in this document, created by a scribe for me, accurately reflects the services I personally performed and the decisions made by me. I have reviewed and approved this document for accuracy. 9:23 AM6/4/2018    Paty Badillo MD  [facility]

## 2018-05-31 DIAGNOSIS — E78.5 HYPERLIPIDEMIA LDL GOAL <100: ICD-10-CM

## 2018-05-31 RX ORDER — METFORMIN HCL 500 MG
TABLET, EXTENDED RELEASE 24 HR ORAL
Qty: 60 TABLET | Refills: 0 | Status: SHIPPED | OUTPATIENT
Start: 2018-05-31 | End: 2018-06-04

## 2018-05-31 RX ORDER — SIMVASTATIN 20 MG
TABLET ORAL
Qty: 30 TABLET | Refills: 0 | Status: SHIPPED | OUTPATIENT
Start: 2018-05-31 | End: 2018-06-04

## 2018-05-31 NOTE — TELEPHONE ENCOUNTER
Medication is being filled for 1 time refill only due to: pt has Sched   OV   Over due for office visit and/or labs   SHANITA Mclean  RN/Boo Tavarez

## 2018-06-01 DIAGNOSIS — I10 HYPERTENSION, BENIGN ESSENTIAL, GOAL BELOW 140/90: ICD-10-CM

## 2018-06-01 RX ORDER — AMLODIPINE BESYLATE 10 MG/1
TABLET ORAL
Qty: 90 TABLET | Refills: 0 | Status: SHIPPED | OUTPATIENT
Start: 2018-06-01 | End: 2018-09-03

## 2018-06-01 NOTE — TELEPHONE ENCOUNTER
"Requested Prescriptions   Pending Prescriptions Disp Refills     amLODIPine (NORVASC) 10 MG tablet [Pharmacy Med Name: AMLODIPINE BESYLATE 10 MG TAB] 90 tablet 0    Last Written Prescription Date:  03/06/2018 #90 x 0  Last filled 03/06/2018  Last office visit: 3/15/2017 PETER Badillo   Future Office Visit:   Next 5 appointments (look out 90 days)     Jun 04, 2018  9:00 AM CDT   PHYSICAL with Paty Badillo MD   [facility]    [address] [address] [phone]                  Sig: TAKE 1 TABLET (10 MG) BY MOUTH DAILY. DUE FOR YEARLY OFFICE VISIT NOW.    Calcium Channel Blockers Protocol  Failed    6/1/2018 12:59 AM       Failed - Blood pressure under 140/90 in past 12 months    BP Readings from Last 3 Encounters:   03/15/17 126/80   01/04/17 (!) 156/96   11/16/15 (!) 146/100                Passed - Recent (12 mo) or future (30 days) visit within the authorizing provider's specialty    Patient had office visit in the last 12 months or has a visit in the next 30 days with authorizing provider or within the authorizing provider's specialty.  See \"Patient Info\" tab in inbasket, or \"Choose Columns\" in Meds & Orders section of the refill encounter.           Passed - Patient is age 18 or older       Passed - Normal serum creatinine on file in past 12 months    Recent Labs   Lab Test  05/24/18   0849   CR  0.85               "

## 2018-06-01 NOTE — TELEPHONE ENCOUNTER
Prescription approved per [facility] Refill Protocol.  Patient has an appointment scheduled for June 4. Emma Sampson RN  '

## 2018-06-04 ENCOUNTER — OFFICE VISIT (OUTPATIENT)
Dept: FAMILY MEDICINE | Facility: CLINIC | Age: 58
End: 2018-06-04
Payer: COMMERCIAL

## 2018-06-04 VITALS
BODY MASS INDEX: 38.36 KG/M2 | SYSTOLIC BLOOD PRESSURE: 120 MMHG | HEART RATE: 74 BPM | HEIGHT: 76 IN | WEIGHT: 315 LBS | DIASTOLIC BLOOD PRESSURE: 82 MMHG

## 2018-06-04 DIAGNOSIS — Z00.00 ROUTINE GENERAL MEDICAL EXAMINATION AT A HEALTH CARE FACILITY: Primary | ICD-10-CM

## 2018-06-04 DIAGNOSIS — E87.6 HYPOKALEMIA: ICD-10-CM

## 2018-06-04 DIAGNOSIS — I10 HYPERTENSION, BENIGN ESSENTIAL, GOAL BELOW 140/90: ICD-10-CM

## 2018-06-04 DIAGNOSIS — E78.5 HYPERLIPIDEMIA LDL GOAL <100: ICD-10-CM

## 2018-06-04 DIAGNOSIS — J45.40 MODERATE PERSISTENT ASTHMA WITHOUT COMPLICATION: ICD-10-CM

## 2018-06-04 LAB
ANION GAP SERPL CALCULATED.3IONS-SCNC: 7 MMOL/L (ref 3–14)
BUN SERPL-MCNC: 13 MG/DL (ref 7–30)
CALCIUM SERPL-MCNC: 8.3 MG/DL (ref 8.5–10.1)
CHLORIDE SERPL-SCNC: 104 MMOL/L (ref 94–109)
CO2 SERPL-SCNC: 30 MMOL/L (ref 20–32)
CREAT SERPL-MCNC: 0.81 MG/DL (ref 0.66–1.25)
GFR SERPL CREATININE-BSD FRML MDRD: >90 ML/MIN/1.7M2
GLUCOSE SERPL-MCNC: 103 MG/DL (ref 70–99)
POTASSIUM SERPL-SCNC: 3.4 MMOL/L (ref 3.4–5.3)
SODIUM SERPL-SCNC: 141 MMOL/L (ref 133–144)

## 2018-06-04 PROCEDURE — 36415 COLL VENOUS BLD VENIPUNCTURE: CPT | Performed by: FAMILY MEDICINE

## 2018-06-04 PROCEDURE — 80048 BASIC METABOLIC PNL TOTAL CA: CPT | Performed by: FAMILY MEDICINE

## 2018-06-04 PROCEDURE — 99207 C FOOT EXAM  NO CHARGE: CPT | Mod: 25 | Performed by: FAMILY MEDICINE

## 2018-06-04 PROCEDURE — 99396 PREV VISIT EST AGE 40-64: CPT | Performed by: FAMILY MEDICINE

## 2018-06-04 RX ORDER — SIMVASTATIN 20 MG
TABLET ORAL
Qty: 90 TABLET | Refills: 3 | Status: SHIPPED | OUTPATIENT
Start: 2018-06-04 | End: 2019-07-05

## 2018-06-04 RX ORDER — METFORMIN HCL 500 MG
TABLET, EXTENDED RELEASE 24 HR ORAL
Qty: 180 TABLET | Refills: 3 | Status: SHIPPED | OUTPATIENT
Start: 2018-06-04 | End: 2019-06-29

## 2018-06-04 NOTE — LETTER
My Asthma Action Plan  Name: Bi Bishop   YOB: 1960  Date: 6/4/2018   My doctor: Paty Badillo MD   My clinic: [facility]        My Control Medicine: Fluticasone + salmeterol (Advair) -  Diskus 250/50 mcg Inhale 1 puff 2 times per day  My Rescue Medicine: Albuterol (Proair/Ventolin/Proventil) inhaler Inhale 2 puffs every 6 hours as needed   My Asthma Severity: moderate persistent  Avoid your asthma triggers: exercise or sports  None            GREEN ZONE   Good Control    I feel good    No cough or wheeze    Can work, sleep and play without asthma symptoms       Take your asthma control medicine every day.     1. If exercise triggers your asthma, take your rescue medication    15 minutes before exercise or sports, and    During exercise if you have asthma symptoms  2. Spacer to use with inhaler: If you have a spacer, make sure to use it with your inhaler             YELLOW ZONE Getting Worse  I have ANY of these:    I do not feel good    Cough or wheeze    Chest feels tight    Wake up at night   1. Keep taking your Green Zone medications  2. Start taking your rescue medicine:    every 20 minutes for up to 1 hour. Then every 4 hours for 24-48 hours.  3. If you stay in the Yellow Zone for more than 12-24 hours, contact your doctor.  4. If you do not return to the Green Zone in 12-24 hours or you get worse, start taking your oral steroid medicine if prescribed by your provider.           RED ZONE Medical Alert - Get Help  I have ANY of these:    I feel awful    Medicine is not helping    Breathing getting harder    Trouble walking or talking    Nose opens wide to breathe       1. Take your rescue medicine NOW  2. If your provider has prescribed an oral steroid medicine, start taking it NOW  3. Call your doctor NOW  4. If you are still in the Red Zone after 20 minutes and you have not reached your doctor:    Take your rescue medicine again and    Call 911 or go to the emergency  room right away    See your regular doctor within 2 weeks of an Emergency Room or Urgent Care visit for follow-up treatment.          Annual Reminders:  Meet with Asthma Educator,  Flu Shot in the Fall, consider Pneumonia Vaccination for patients with asthma (aged 19 and older).    Pharmacy:    [facility] [facility] [place] [phone] [facility]  [facility] [id number] [place] [facility] - [address]                      Asthma Triggers  How To Control Things That Make Your Asthma Worse    Triggers are things that make your asthma worse.  Look at the list below to help you find your triggers and what you can do about them.  You can help prevent asthma flare-ups by staying away from your triggers.      Trigger                                                          What you can do   Cigarette Smoke  Tobacco smoke can make asthma worse. Do not allow smoking in your home, car or around you.  Be sure no one smokes at a child s day care or school.  If you smoke, ask your health care provider for ways to help you quit.  Ask family members to quit too.  Ask your health care provider for a referral to Quit Plan to help you quit smoking, or call 9-961-038-PLAN.     Colds, Flu, Bronchitis  These are common triggers of asthma. Wash your hands often.  Don t touch your eyes, nose or mouth.  Get a flu shot every year.     Dust Mites  These are tiny bugs that live in cloth or carpet. They are too small to see. Wash sheets and blankets in hot water every week.   Encase pillows and mattress in dust mite proof covers.  Avoid having carpet if you can. If you have carpet, vacuum weekly.   Use a dust mask and HEPA vacuum.   Pollen and Outdoor Mold  Some people are allergic to trees, grass, or weed pollen, or molds. Try to keep your windows closed.  Limit time out doors when pollen count is high.   Ask you health care provider about taking medicine during allergy season.     Animal Dander  Some people are allergic to  skin flakes, urine or saliva from pets with fur or feathers. Keep pets with fur or feathers out of your home.    If you can t keep the pet outdoors, then keep the pet out of your bedroom.  Keep the bedroom door closed.  Keep pets off cloth furniture and away from stuffed toys.     Mice, Rats, and Cockroaches  Some people are allergic to the waste from these pests.   Cover food and garbage.  Clean up spills and food crumbs.  Store grease in the refrigerator.   Keep food out of the bedroom.   Indoor Mold  This can be a trigger if your home has high moisture. Fix leaking faucets, pipes, or other sources of water.   Clean moldy surfaces.  Dehumidify basement if it is damp and smelly.   Smoke, Strong Odors, and Sprays  These can reduce air quality. Stay away from strong odors and sprays, such as perfume, powder, hair spray, paints, smoke incense, paint, cleaning products, candles and new carpet.   Exercise or Sports  Some people with asthma have this trigger. Be active!  Ask your doctor about taking medicine before sports or exercise to prevent symptoms.    Warm up for 5-10 minutes before and after sports or exercise.     Other Triggers of Asthma  Cold air:  Cover your nose and mouth with a scarf.  Sometimes laughing or crying can be a trigger.  Some medicines and food can trigger asthma.

## 2018-06-04 NOTE — MR AVS SNAPSHOT
"              After Visit Summary   6/4/2018    Bi Bishop    MRN: [id number]           Patient Information     Date Of Birth          1960        Visit Information        Provider Department      6/4/2018 9:00 AM Paty Badillo MD [facility]        Today's Diagnoses     Routine general medical examination at a health care facility    -  1    Uncontrolled type 2 diabetes mellitus without complication, without long-term current use of insulin (H)        Moderate persistent asthma        Hypokalemia        Hyperlipidemia LDL goal <100        Class 2 obesity with serious comorbidity and body mass index (BMI) of 39.0 to 39.9 in adult, unspecified obesity type          Care Instructions      Preventive Health Recommendations  Male Ages 50 - 64      *   Overall, you're doing well.     *   Eye exam every 2 years.     *   No change in medications.     *    I'm happy with your health.     *   Follow up in one year. Will need an A1c in 6 months. This would be a \"lab only\" appointment, no need for an office visit. Please call our clinic phone # to set this up.      Yearly exam:             See your health care provider every year in order to  o   Review health changes.   o   Discuss preventive care.    o   Review your medicines if your doctor has prescribed any.     Have a cholesterol test every 5 years, or more frequently if you are at risk for high cholesterol/heart disease.     Have a diabetes test (fasting glucose) every three years. If you are at risk for diabetes, you should have this test more often.     Have a colonoscopy at age 50, or have a yearly FIT test (stool test). These exams will check for colon cancer.      Talk with your health care provider about whether or not a prostate cancer screening test (PSA) is right for you.    You should be tested each year for STDs (sexually transmitted diseases), if you re at risk.     Shots: Get a flu shot each year. Get a tetanus shot every 10 " "years.     Nutrition:    Eat at least 5 servings of fruits and vegetables daily.     Eat whole-grain bread, whole-wheat pasta and brown rice instead of white grains and rice.     Talk to your provider about Calcium and Vitamin D.     Lifestyle    Exercise for at least 150 minutes a week (30 minutes a day, 5 days a week). This will help you control your weight and prevent disease.     Limit alcohol to one drink per day.     No smoking.     Wear sunscreen to prevent skin cancer.     See your dentist every six months for an exam and cleaning.     See your eye doctor every 1 to 2 years.            Follow-ups after your visit        Who to contact     Normal or non-critical lab and imaging results will be communicated to you by Yieldexhart, letter or phone within 4 business days after the clinic has received the results. If you do not hear from us within 7 days, please contact the clinic through Viddlert or phone. If you have a critical or abnormal lab result, we will notify you by phone as soon as possible.  Submit refill requests through AdviseHub or call your pharmacy and they will forward the refill request to us. Please allow 3 business days for your refill to be completed.          If you need to speak with a  for additional information , please call: [phone]           Additional Information About Your Visit        AdviseHub Information     [employer] gives you secure access to your electronic health record. If you see a primary care provider, you can also send messages to your care team and make appointments. If you have questions, please call your primary care clinic.  If you do not have a primary care provider, please call [phone] and they will assist you.        Care EveryWhere ID     This is your Care EveryWhere ID. This could be used by other organizations to access your [facility] medical records  [id number]        Your Vitals Were     Pulse Height BMI (Body Mass Index)             74 6' 3.75\" " (1.924 m) 39.5 kg/m2          Blood Pressure from Last 3 Encounters:   06/04/18 120/82   03/15/17 126/80   01/04/17 (!) 156/96    Weight from Last 3 Encounters:   06/04/18 322 lb 6 oz (146.2 kg)   03/15/17 (!) 304 lb 2 oz (138 kg)   01/19/17 (!) 343 lb (155.6 kg)              We Performed the Following     Asthma Action Plan (AAP)     Basic metabolic panel     FOOT EXAM          Today's Medication Changes          These changes are accurate as of 6/4/18  9:50 AM.  If you have any questions, ask your nurse or doctor.               These medicines have changed or have updated prescriptions.        Dose/Directions    losartan 100 MG tablet   Commonly known as:  COZAAR   This may have changed:  Another medication with the same name was removed. Continue taking this medication, and follow the directions you see here.   Used for:  Hypertension, benign essential, goal below 140/90   Changed by:  Paty Badillo MD        Dose:  100 mg   Take 1 tablet (100 mg) by mouth daily   Quantity:  90 tablet   Refills:  3       metFORMIN 500 MG 24 hr tablet   Commonly known as:  GLUCOPHAGE-XR   This may have changed:  See the new instructions.   Used for:  Uncontrolled type 2 diabetes mellitus without complication, without long-term current use of insulin (H)   Changed by:  Paty Badillo MD        TAKE 2 TABLETS (1,000 MG) BY MOUTH DAILY (WITH DINNER)   Quantity:  180 tablet   Refills:  3       simvastatin 20 MG tablet   Commonly known as:  ZOCOR   This may have changed:  See the new instructions.   Used for:  Hyperlipidemia LDL goal <100   Changed by:  Paty Badillo MD        TAKE 1 TABLET (20 MG) BY MOUTH AT BEDTIME.   Quantity:  90 tablet   Refills:  3            Where to get your medicines      These medications were sent [place] [facility] [place] [facility] [place] [address]  [address] [address]     Phone:  [phone]     metFORMIN 500 MG 24 hr tablet    simvastatin 20 MG tablet                 Primary Care Provider Office Phone # Fax #    Paty Badillo [phone] [address] [place] [address]        Equal Access to Services     MENG PATTERSON : Hadii misty hernandez vadimo Juancarlos, waaxda luqadaha, qaybta kaalmada adedarrenyada, david ngonavjot fisher. So [facility] [phone].    ATENCIÓN: Si habla español, tiene a stahl disposición servicios gratuitos de asistencia lingüística. Llame al [phone].    We comply with applicable federal civil rights laws and [place] laws. We do not discriminate on the basis of race, color, national origin, age, disability, sex, sexual orientation, or gender identity.            Thank you!     Thank you for choosing [facility]  for your care. Our goal is always to provide you with excellent care. Hearing back from our patients is one way we can continue to improve our services. Please take a few minutes to complete the written survey that you may receive in the mail after your visit with us. Thank you!             Your Updated Medication List - Protect others around you: Learn how to safely use, store and throw away your medicines at www.disposemymeds.org.          This list is accurate as of 6/4/18  9:50 AM.  Always use your most recent med list.                   Brand Name Dispense Instructions for use Diagnosis    albuterol 108 (90 Base) MCG/ACT Inhaler    PROVENTIL HFA    3 Inhaler    Inhale 2 puffs into the lungs every 6 hours as needed for shortness of breath / dyspnea    Moderate persistent asthma without complication       amLODIPine 10 MG tablet    NORVASC    90 tablet    TAKE 1 TABLET (10 MG) BY MOUTH DAILY. DUE FOR YEARLY OFFICE VISIT NOW.    Hypertension, benign essential, goal below 140/90       aspirin 81 MG chewable tablet      Take 81 mg by mouth daily.        blood glucose monitoring lancets     1 Box    Use to test blood sugars 1-3 times daily    Uncontrolled type 2 diabetes mellitus without  complication, without long-term current use of insulin (H)       blood glucose monitoring meter device kit     1 kit    Use to test blood sugars 1-3 times daily    Uncontrolled type 2 diabetes mellitus without complication, without long-term current use of insulin (H)       blood glucose monitoring test strip    ONETOUCH VERIO IQ    300 strip    1 strip by In Vitro route 3 times daily    Uncontrolled type 2 diabetes mellitus without complication, without long-term current use of insulin (H)       cetirizine 10 MG tablet    zyrTEC     Take 10 mg by mouth daily.        FISH OIL PO      Take  by mouth 2 times daily.        fluticasone-salmeterol 250-50 MCG/DOSE diskus inhaler    ADVAIR DISKUS    180 Inhaler    Inhale 1 puff into the lungs every 12 hours    Moderate persistent asthma without complication       HYDROmorphone 4 MG tablet    DILAUDID    15 tablet    Take 1 tablet (4 mg) by mouth every 6 hours as needed for pain    Sprain of lumbar region, initial encounter       losartan 100 MG tablet    COZAAR    90 tablet    Take 1 tablet (100 mg) by mouth daily    Hypertension, benign essential, goal below 140/90       metFORMIN 500 MG 24 hr tablet    GLUCOPHAGE-XR    180 tablet    TAKE 2 TABLETS (1,000 MG) BY MOUTH DAILY (WITH DINNER)    Uncontrolled type 2 diabetes mellitus without complication, without long-term current use of insulin (H)       MULTI-VITAMIN PO      Once daily        omeprazole 20 MG CR capsule    priLOSEC     Take 20 mg by mouth daily.        simvastatin 20 MG tablet    ZOCOR    90 tablet    TAKE 1 TABLET (20 MG) BY MOUTH AT BEDTIME.    Hyperlipidemia LDL goal <100       vardenafil 20 MG tablet    LEVITRA    6 tablet    Take 0.5-1 tablets by mouth daily as needed for erectile dysfunction.    Impotence of organic origin       VITAMIN C CR PO      Take 1,000 mg by mouth daily.        vitamin D 1000 units capsule      Take 1 capsule by mouth daily.    HTN (hypertension)

## 2018-06-05 ASSESSMENT — ASTHMA QUESTIONNAIRES: ACT_TOTALSCORE: 25

## 2018-06-07 RX ORDER — LOSARTAN POTASSIUM 100 MG/1
100 TABLET ORAL DAILY
Qty: 90 TABLET | Refills: 3 | Status: SHIPPED | OUTPATIENT
Start: 2018-06-07 | End: 2019-05-28

## 2018-06-25 NOTE — TELEPHONE ENCOUNTER
"Requested Prescriptions   Pending Prescriptions Disp Refills     ONETOUCH VERIO IQ test strip [Pharmacy Med Name: ONE TOUCH VERIO TEST STRIP] 300 strip 3    Last Written Prescription Date:  04/11/2017 #300 x 3  Last filled 03/31/2018  Last office visit: 6/4/2018 PETER Badillo   Future Office Visit:  None   Sig: USE TO TEST 3 TIMES DAILY.    Diabetic Supplies Protocol Passed    6/25/2018  1:37 AM       Passed - Patient is 18 years of age or older       Passed - Recent (6 mo) or future (30 days) visit within the authorizing provider's specialty    Patient had office visit in the last 6 months or has a visit in the next 30 days with authorizing provider.  See \"Patient Info\" tab in inbasket, or \"Choose Columns\" in Meds & Orders section of the refill encounter.              "

## 2018-06-27 RX ORDER — BLOOD SUGAR DIAGNOSTIC
STRIP MISCELLANEOUS
Qty: 300 STRIP | Refills: 3 | Status: SHIPPED | OUTPATIENT
Start: 2018-06-27 | End: 2020-11-11

## 2018-06-27 NOTE — TELEPHONE ENCOUNTER
Prescription approved per [facility] Refill Protocol or patient Primary care provider (PCP)  SHANITA Mclean RN/Boo Tavarez

## 2018-07-09 ENCOUNTER — TELEPHONE (OUTPATIENT)
Dept: FAMILY MEDICINE | Facility: CLINIC | Age: 58
End: 2018-07-09

## 2018-07-09 DIAGNOSIS — S33.5XXA SPRAIN OF LUMBAR REGION, INITIAL ENCOUNTER: ICD-10-CM

## 2018-07-09 RX ORDER — HYDROMORPHONE HYDROCHLORIDE 4 MG/1
4 TABLET ORAL EVERY 6 HOURS PRN
Qty: 15 TABLET | Refills: 0 | Status: SHIPPED | OUTPATIENT
Start: 2018-07-09 | End: 2018-09-26

## 2018-07-09 NOTE — TELEPHONE ENCOUNTER
Pt is calling and asking for a refill on his pain medication  , he is out of medication. , he will pick it up at the  . Please call him once ready.     Makenna Landrum, [facility] [place]

## 2018-07-27 ENCOUNTER — TRANSFERRED RECORDS (OUTPATIENT)
Dept: HEALTH INFORMATION MANAGEMENT | Facility: CLINIC | Age: 58
End: 2018-07-27

## 2018-08-21 ENCOUNTER — TELEPHONE (OUTPATIENT)
Dept: FAMILY MEDICINE | Facility: CLINIC | Age: 58
End: 2018-08-21

## 2018-08-21 DIAGNOSIS — J45.40 MODERATE PERSISTENT ASTHMA WITHOUT COMPLICATION: ICD-10-CM

## 2018-08-21 NOTE — TELEPHONE ENCOUNTER
Pt is calling and asking for a new rx be sent to the [facility]. Please advise.     Makenna Landrum, [facility] [place]

## 2018-08-21 NOTE — TELEPHONE ENCOUNTER
Prescription approved per [facility] Refill Protocol.  Nahomi GALLOWAY RN

## 2018-09-03 DIAGNOSIS — I10 HYPERTENSION, BENIGN ESSENTIAL, GOAL BELOW 140/90: ICD-10-CM

## 2018-09-04 RX ORDER — AMLODIPINE BESYLATE 10 MG/1
10 TABLET ORAL DAILY
Qty: 90 TABLET | Refills: 2 | Status: SHIPPED | OUTPATIENT
Start: 2018-09-04 | End: 2019-05-28

## 2018-09-04 NOTE — TELEPHONE ENCOUNTER
"Prescription approved per [facility] Refill Protocol.    Requested Prescriptions   Pending Prescriptions Disp Refills     amLODIPine (NORVASC) 10 MG tablet [Pharmacy Med Name: AMLODIPINE BESYLATE 10 MG TAB] 90 tablet 0     Sig: TAKE 1 TABLET (10 MG) BY MOUTH DAILY. DUE FOR YEARLY OFFICE VISIT NOW.    Calcium Channel Blockers Protocol  Passed    9/3/2018  1:57 AM       Passed - Blood pressure under 140/90 in past 12 months    BP Readings from Last 3 Encounters:   06/04/18 120/82   03/15/17 126/80   01/04/17 (!) 156/96                Passed - Recent (12 mo) or future (30 days) visit within the authorizing provider's specialty    Patient had office visit in the last 12 months or has a visit in the next 30 days with authorizing provider or within the authorizing provider's specialty.  See \"Patient Info\" tab in inbasket, or \"Choose Columns\" in Meds & Orders section of the refill encounter.           Passed - Patient is age 18 or older       Passed - Normal serum creatinine on file in past 12 months    Recent Labs   Lab Test  06/04/18   0955   CR  0.81             Last Written Prescription Date:  6/1/18  Last Fill Quantity: 90,  # refills: 0   Last office visit: 6/4/2018 with prescribing provider:  Dr. Badillo   Future Office Visit:      Nahomi GALLOWAY RN        "

## 2018-09-26 ENCOUNTER — TELEPHONE (OUTPATIENT)
Dept: FAMILY MEDICINE | Facility: CLINIC | Age: 58
End: 2018-09-26

## 2018-09-26 DIAGNOSIS — S33.5XXA SPRAIN OF LUMBAR REGION, INITIAL ENCOUNTER: ICD-10-CM

## 2018-09-26 NOTE — TELEPHONE ENCOUNTER
Pt is calling and states that he needs a refill on his pain med , for his shoulder pain.  He would like to pick it up at the .     pt is also asking if DR Badillo can give him a call regarding something personal.     Makenna Landrum, [facility] [place]

## 2018-09-27 RX ORDER — HYDROMORPHONE HYDROCHLORIDE 4 MG/1
4 TABLET ORAL EVERY 6 HOURS PRN
Qty: 15 TABLET | Refills: 0 | Status: SHIPPED | OUTPATIENT
Start: 2018-09-27 | End: 2019-02-06

## 2018-11-19 DIAGNOSIS — J45.40 MODERATE PERSISTENT ASTHMA WITHOUT COMPLICATION: ICD-10-CM

## 2019-02-06 ENCOUNTER — TELEPHONE (OUTPATIENT)
Dept: FAMILY MEDICINE | Facility: CLINIC | Age: 59
End: 2019-02-06

## 2019-02-06 DIAGNOSIS — S33.5XXA SPRAIN OF LUMBAR REGION, INITIAL ENCOUNTER: ICD-10-CM

## 2019-02-06 RX ORDER — HYDROMORPHONE HYDROCHLORIDE 4 MG/1
4 TABLET ORAL EVERY 6 HOURS PRN
Qty: 15 TABLET | Refills: 0 | Status: SHIPPED | OUTPATIENT
Start: 2019-02-06 | End: 2019-04-15

## 2019-02-06 NOTE — TELEPHONE ENCOUNTER
Pt is looking for his hard copy of dilaudid to be refilled, he will pick it up at the .     Makenna Landrum, Station

## 2019-04-01 NOTE — TELEPHONE ENCOUNTER
Re: OneTouch is non formulary, ordered generic            Requested Prescriptions   Pending Prescriptions Disp Refills     blood glucose (NO BRAND SPECIFIED) test strip 300 strip 3    Last Written Prescription Date:  06/27/2018 #300 x 3  Last filled - not provided  Last office visit: 6/4/2018 PETER Badillo   Future Office Visit: None    Sig: Use to test blood sugar 1-3 times daily or as directed. To accompany:     Diabetic Supplies Protocol Failed - 4/1/2019  2:43 PM       Failed - Recent (6 mo) or future (30 days) visit within the authorizing provider's specialty    Patient had office visit in the last 6 months or has a visit in the next 30 days with authorizing provider.   Orders section of the refill encounter.           Passed - Medication is active on med list       Passed - Patient is 18 years of age or older

## 2019-04-02 NOTE — TELEPHONE ENCOUNTER
Prescription approved per [facility] Refill Protocol.  Leeanne Fernandes RN

## 2019-04-15 ENCOUNTER — TELEPHONE (OUTPATIENT)
Dept: FAMILY MEDICINE | Facility: CLINIC | Age: 59
End: 2019-04-15

## 2019-04-15 DIAGNOSIS — S33.5XXA SPRAIN OF LUMBAR REGION, INITIAL ENCOUNTER: ICD-10-CM

## 2019-04-15 RX ORDER — HYDROMORPHONE HYDROCHLORIDE 4 MG/1
4 TABLET ORAL EVERY 6 HOURS PRN
Qty: 15 TABLET | Refills: 0 | Status: SHIPPED | OUTPATIENT
Start: 2019-04-15 | End: 2019-07-08

## 2019-04-15 NOTE — TELEPHONE ENCOUNTER
Script walked over to the [facility] pharmacy.    Evie Hurtado, [facility]

## 2019-04-15 NOTE — TELEPHONE ENCOUNTER
Pt is calling and looking for a refill on his Dilaudid medication.  Pt is looking to  his script at the  please. He is wanting a refill TODAY if at all possible.     Makenna Landrum, [place]

## 2019-05-01 NOTE — LETTER
[facility]  [address] [address]  [phone]        June 24, 2019    Bi Bishop  [address] [address]              Dear Bi Bishop    This is to remind you that your non fasting lab is due.    You may call our office at [phone] to schedule an appointment.    Please disregard this notice if you have already had your labs drawn or made an appointment.        Sincerely,        Paty Badillo MD           Never

## 2019-05-15 DIAGNOSIS — J45.40 MODERATE PERSISTENT ASTHMA WITHOUT COMPLICATION: ICD-10-CM

## 2019-05-15 NOTE — TELEPHONE ENCOUNTER
"Requested Prescriptions   Pending Prescriptions Disp Refills     ADVAIR DISKUS 250-50 MCG/DOSE inhaler [Pharmacy Med Name: ADVAIR 250-50 DISKUS]  Last Written Prescription Date:  11/19/18  Last Fill Quantity: 3,  # refills: 1   Last office visit: 6/4/2018 with prescribing provider:  jean-paul   Future Office Visit:      1     Sig: INHALE 1 PUFF INTO THE LUNGS EVERY 12 HOURS       Inhaled Steroids Protocol Failed - 5/15/2019 10:41 AM        Failed - Asthma control assessment score within normal limits in last 6 months     Please review ACT score.           Failed - Recent (6 mo) or future (30 days) visit within the authorizing provider's specialty     Patient had office visit in the last 6 months or has a visit in the next 30 days with authorizing provider or within the authorizing provider's specialty.  See \"Patient Info\" tab in inbasket, or \"Choose Columns\" in Meds & Orders section of the refill encounter.            Passed - Patient is age 12 or older        Passed - Medication is active on med list          "

## 2019-05-28 DIAGNOSIS — I10 HYPERTENSION, BENIGN ESSENTIAL, GOAL BELOW 140/90: ICD-10-CM

## 2019-05-29 NOTE — TELEPHONE ENCOUNTER
"Requested Prescriptions   Pending Prescriptions Disp Refills     amLODIPine (NORVASC) 10 MG tablet [Pharmacy Med Name: AMLODIPINE BESYLATE 10 MG TAB]  Last Written Prescription Date:  9/4/18  Last Fill Quantity: 90,  # refills: 2   Last office visit: 6/4/2018 with prescribing provider:  jean-paul   Future Office Visit:     90 tablet 2     Sig: TAKE 1 TABLET BY MOUTH EVERY DAY       Calcium Channel Blockers Protocol  Passed - 5/28/2019  1:23 AM        Passed - Blood pressure under 140/90 in past 12 months     BP Readings from Last 3 Encounters:   06/04/18 120/82   03/15/17 126/80   01/04/17 (!) 156/96                 Passed - Recent (12 mo) or future (30 days) visit within the authorizing provider's specialty     Patient had office visit in the last 12 months or has a visit in the next 30 days with authorizing provider or within the authorizing provider's specialty.  See \"Patient Info\" tab in inbasket, or \"Choose Columns\" in Meds & Orders section of the refill encounter.              Passed - Medication is active on med list        Passed - Patient is age 18 or older        Passed - Normal serum creatinine on file in past 12 months     Recent Labs   Lab Test 06/04/18  0955   CR 0.81             losartan (COZAAR) 100 MG tablet [Pharmacy Med Name: LOSARTAN POTASSIUM 100 MG TAB]  Last Written Prescription Date:  6/7/18  Last Fill Quantity: 90,  # refills: 3   Last office visit: 6/4/2018 with prescribing provider:  jean-paul   Future Office Visit:     90 tablet 3     Sig: TAKE 1 TABLET BY MOUTH EVERY DAY       Angiotensin-II Receptors Passed - 5/28/2019  1:23 AM        Passed - Blood pressure under 140/90 in past 12 months     BP Readings from Last 3 Encounters:   06/04/18 120/82   03/15/17 126/80   01/04/17 (!) 156/96                 Passed - Recent (12 mo) or future (30 days) visit within the authorizing provider's specialty     Patient had office visit in the last 12 months or has a visit in the next 30 days with " "authorizing provider or within the authorizing provider's specialty.  See \"Patient Info\" tab in inbasket, or \"Choose Columns\" in Meds & Orders section of the refill encounter.              Passed - Medication is active on med list        Passed - Patient is age 18 or older        Passed - Normal serum creatinine on file in past 12 months     Recent Labs   Lab Test 06/04/18  0955   CR 0.81             Passed - Normal serum potassium on file in past 12 months     Recent Labs   Lab Test 06/04/18  0955   POTASSIUM 3.4                      "

## 2019-05-31 RX ORDER — AMLODIPINE BESYLATE 10 MG/1
TABLET ORAL
Qty: 90 TABLET | Refills: 0 | Status: SHIPPED | OUTPATIENT
Start: 2019-05-31 | End: 2019-08-28

## 2019-05-31 RX ORDER — LOSARTAN POTASSIUM 100 MG/1
TABLET ORAL
Qty: 90 TABLET | Refills: 0 | Status: SHIPPED | OUTPATIENT
Start: 2019-05-31 | End: 2019-08-28

## 2019-05-31 NOTE — TELEPHONE ENCOUNTER
Prescription approved per [facility] Refill Protocol or patient Primary care provider (PCP)  SHANITA Mclean RN/Boo Tavarez

## 2019-07-05 DIAGNOSIS — E78.5 HYPERLIPIDEMIA LDL GOAL <100: ICD-10-CM

## 2019-07-05 RX ORDER — SIMVASTATIN 20 MG
TABLET ORAL
Qty: 30 TABLET | Refills: 0 | Status: SHIPPED | OUTPATIENT
Start: 2019-07-05 | End: 2019-08-27

## 2019-07-05 NOTE — TELEPHONE ENCOUNTER
"Requested Prescriptions   Pending Prescriptions Disp Refills     simvastatin (ZOCOR) 20 MG tablet [Pharmacy Med Name: SIMVASTATIN 20 MG TABLET] 90 tablet 3     Sig: TAKE 1 TABLET (20 MG) BY MOUTH AT BEDTIME.  Last Written Prescription Date:  06/04/2018 #90 x 3  Last filled 04/08/2019  Last office visit: 6/4/2018 with prescribing provider:  PETER Badillo   Future Office Visit:  None         Statins Protocol Failed - 7/5/2019  1:19 AM        Failed - LDL on file in past 12 months     Recent Labs   Lab Test 05/24/18  0849   LDL 82             Failed - Recent (12 mo) or future (30 days) visit within the authorizing provider's specialty     Patient had office visit in the last 12 months or has a visit in the next 30 days with authorizing provider or within the authorizing provider's specialty.  See \"Patient Info\" tab in inbasket, or \"Choose Columns\" in Meds & Orders section of the refill encounter.              Passed - No abnormal creatine kinase in past 12 months     No lab results found.             Passed - Medication is active on med list        Passed - Patient is age 18 or older          "

## 2019-07-08 DIAGNOSIS — S33.5XXA SPRAIN OF LUMBAR REGION, INITIAL ENCOUNTER: ICD-10-CM

## 2019-07-08 RX ORDER — HYDROMORPHONE HYDROCHLORIDE 4 MG/1
4 TABLET ORAL EVERY 6 HOURS PRN
Qty: 15 TABLET | Refills: 0 | Status: SHIPPED | OUTPATIENT
Start: 2019-07-08 | End: 2020-04-08

## 2019-07-08 NOTE — TELEPHONE ENCOUNTER
Prescription signed, pt called left message that script will be at  for .    Autumn Parkinson  [place]

## 2019-07-08 NOTE — TELEPHONE ENCOUNTER
Requested Prescriptions   Pending Prescriptions Disp Refills     HYDROmorphone (DILAUDID) 4 MG tablet 15 tablet 0     Sig: Take 1 tablet (4 mg) by mouth every 6 hours as needed for pain       There is no refill protocol information for this order        Last Written Prescription Date:  04/15/19  Last Fill Quantity: 15,  # refills: 0   Last office visit: 6/4/2018 with prescribing provider:  Aby   Future Office Visit:

## 2019-07-31 NOTE — TELEPHONE ENCOUNTER
"Requested Prescriptions   Pending Prescriptions Disp Refills     metFORMIN (GLUCOPHAGE-XR) 500 MG 24 hr tablet  Last Written Prescription Date:  7/2/19  Last Fill Quantity: 60,  # refills: 0   Last office visit: 6/4/2018 with prescribing provider:  jean-paul   Future Office Visit:   Next 5 appointments (look out 90 days)    Aug 09, 2019 11:20 AM CDT  PHYSICAL with Paty Badillo MD  [facility] ([facility] [address] [address] [phone]          60 tablet 0       Biguanide Agents Failed - 7/31/2019  2:54 PM        Failed - Blood pressure less than 140/90 in past 6 months     BP Readings from Last 3 Encounters:   06/04/18 120/82   03/15/17 126/80   01/04/17 (!) 156/96                 Failed - Patient has documented LDL within the past 12 mos.     Recent Labs   Lab Test 05/24/18  0849   LDL 82             Failed - Patient has had a Microalbumin in the past 15 mos.     Recent Labs   Lab Test 03/15/17  0830   MICROL 10   UMALCR 13.34             Failed - Patient has documented A1c within the specified period of time.     If HgbA1C is 8 or greater, it needs to be on file within the past 3 months.  If less than 8, must be on file within the past 6 months.     Recent Labs   Lab Test 05/24/18  0849   A1C 5.6             Failed - Patient's CR is NOT>1.4 OR Patient's EGFR is NOT<45 within past 12 mos.     Recent Labs   Lab Test 06/04/18  0955   GFRESTIMATED >90   GFRESTBLACK >90       Recent Labs   Lab Test 06/04/18  0955   CR 0.81             Passed - Patient is age 10 or older        Passed - Patient does NOT have a diagnosis of CHF.        Passed - Medication is active on med list        Passed - Recent (6 mo) or future (30 days) visit within the authorizing provider's specialty     Patient had office visit in the last 6 months or has a visit in the next 30 days with authorizing provider or within the authorizing provider's specialty.  See \"Patient Info\" tab in " "inbasket, or \"Choose Columns\" in Meds & Orders section of the refill encounter.              "

## 2019-08-01 ENCOUNTER — TELEPHONE (OUTPATIENT)
Dept: LAB | Facility: CLINIC | Age: 59
End: 2019-08-01

## 2019-08-01 RX ORDER — METFORMIN HCL 500 MG
1000 TABLET, EXTENDED RELEASE 24 HR ORAL
Qty: 16 TABLET | Refills: 0 | Status: SHIPPED | OUTPATIENT
Start: 2019-08-01 | End: 2019-08-09

## 2019-08-01 NOTE — TELEPHONE ENCOUNTER
Patient coming in for lab work on Tuesday, 8/6/19.       Patient will be fasting.       Please place future orders. Thanks

## 2019-08-01 NOTE — TELEPHONE ENCOUNTER
Medication is being filled for 1 time refill only due to:  Has appt scheduled for 8/9/19. Received x1 month valery refill already on 7/2/19.   Nahomi GALLOWAY RN

## 2019-08-06 DIAGNOSIS — E78.5 HYPERLIPIDEMIA LDL GOAL <100: ICD-10-CM

## 2019-08-06 DIAGNOSIS — I10 HYPERTENSION, BENIGN ESSENTIAL, GOAL BELOW 140/90: ICD-10-CM

## 2019-08-06 LAB
ANION GAP SERPL CALCULATED.3IONS-SCNC: 3 MMOL/L (ref 3–14)
BUN SERPL-MCNC: 18 MG/DL (ref 7–30)
CALCIUM SERPL-MCNC: 8.5 MG/DL (ref 8.5–10.1)
CHLORIDE SERPL-SCNC: 102 MMOL/L (ref 94–109)
CHOLEST SERPL-MCNC: 126 MG/DL
CO2 SERPL-SCNC: 33 MMOL/L (ref 20–32)
CREAT SERPL-MCNC: 0.91 MG/DL (ref 0.66–1.25)
GFR SERPL CREATININE-BSD FRML MDRD: >90 ML/MIN/{1.73_M2}
GLUCOSE SERPL-MCNC: 121 MG/DL (ref 70–99)
HBA1C MFR BLD: 6.2 % (ref 0–5.6)
HDLC SERPL-MCNC: 40 MG/DL
LDLC SERPL CALC-MCNC: 55 MG/DL
NONHDLC SERPL-MCNC: 86 MG/DL
POTASSIUM SERPL-SCNC: 3.2 MMOL/L (ref 3.4–5.3)
SODIUM SERPL-SCNC: 138 MMOL/L (ref 133–144)
TRIGL SERPL-MCNC: 153 MG/DL

## 2019-08-06 PROCEDURE — 36415 COLL VENOUS BLD VENIPUNCTURE: CPT | Performed by: FAMILY MEDICINE

## 2019-08-06 PROCEDURE — 80048 BASIC METABOLIC PNL TOTAL CA: CPT | Performed by: FAMILY MEDICINE

## 2019-08-06 PROCEDURE — 80061 LIPID PANEL: CPT | Performed by: FAMILY MEDICINE

## 2019-08-06 PROCEDURE — 83036 HEMOGLOBIN GLYCOSYLATED A1C: CPT | Performed by: FAMILY MEDICINE

## 2019-08-09 ENCOUNTER — OFFICE VISIT (OUTPATIENT)
Dept: FAMILY MEDICINE | Facility: CLINIC | Age: 59
End: 2019-08-09
Payer: COMMERCIAL

## 2019-08-09 VITALS
HEART RATE: 68 BPM | WEIGHT: 315 LBS | SYSTOLIC BLOOD PRESSURE: 134 MMHG | BODY MASS INDEX: 38.36 KG/M2 | HEIGHT: 76 IN | RESPIRATION RATE: 14 BRPM | DIASTOLIC BLOOD PRESSURE: 84 MMHG | TEMPERATURE: 98.4 F

## 2019-08-09 DIAGNOSIS — J45.40 MODERATE PERSISTENT ASTHMA WITHOUT COMPLICATION: ICD-10-CM

## 2019-08-09 DIAGNOSIS — Z00.00 ROUTINE GENERAL MEDICAL EXAMINATION AT A HEALTH CARE FACILITY: Primary | ICD-10-CM

## 2019-08-09 DIAGNOSIS — E66.01 MORBID OBESITY (H): ICD-10-CM

## 2019-08-09 DIAGNOSIS — R73.01 ELEVATED FASTING BLOOD SUGAR: Primary | ICD-10-CM

## 2019-08-09 DIAGNOSIS — G89.29 CHRONIC RIGHT SHOULDER PAIN: ICD-10-CM

## 2019-08-09 DIAGNOSIS — M25.511 CHRONIC RIGHT SHOULDER PAIN: ICD-10-CM

## 2019-08-09 LAB
CREAT UR-MCNC: 30 MG/DL
MICROALBUMIN UR-MCNC: 5 MG/L
MICROALBUMIN/CREAT UR: 17.01 MG/G CR (ref 0–17)

## 2019-08-09 PROCEDURE — 99213 OFFICE O/P EST LOW 20 MIN: CPT | Mod: 25 | Performed by: FAMILY MEDICINE

## 2019-08-09 PROCEDURE — 99396 PREV VISIT EST AGE 40-64: CPT | Performed by: FAMILY MEDICINE

## 2019-08-09 PROCEDURE — 82043 UR ALBUMIN QUANTITATIVE: CPT | Performed by: FAMILY MEDICINE

## 2019-08-09 ASSESSMENT — PAIN SCALES - GENERAL: PAINLEVEL: NO PAIN (0)

## 2019-08-09 ASSESSMENT — MIFFLIN-ST. JEOR: SCORE: 2410.07

## 2019-08-09 NOTE — PATIENT INSTRUCTIONS
"  Preventive Health Recommendations  Male Ages 50 - 64      Overall, you're doing well.   No change in medications.   Colonoscopy in 2021.     Will need an A1c in 6 months. This would a lab appointment, not a doctor's appointment. Please call the clinic #, or use [employer],  to set this up.  Call with any questions or concerns.     Yearly check up.     You have \"golfer's elbow\" keep doing watch you're doing. Think about new .    Work on the weight.     For a shoulder specialist, see Dr. Robert Dominique.     Wt Readings from Last 4 Encounters:   08/09/19 (!) 150.3 kg (331 lb 4 oz)   06/04/18 146.2 kg (322 lb 6 oz)   03/15/17 (!) 138 kg (304 lb 2 oz)   01/19/17 (!) 155.6 kg (343 lb)            Yearly exam:             See your health care provider every year in order to  o   Review health changes.   o   Discuss preventive care.    o   Review your medicines if your doctor has prescribed any.     Have a cholesterol test every 5 years, or more frequently if you are at risk for high cholesterol/heart disease.     Have a diabetes test (fasting glucose) every three years. If you are at risk for diabetes, you should have this test more often.     Have a colonoscopy at age 50, or have a yearly FIT test (stool test). These exams will check for colon cancer.      Talk with your health care provider about whether or not a prostate cancer screening test (PSA) is right for you.    You should be tested each year for STDs (sexually transmitted diseases), if you re at risk.     Shots: Get a flu shot each year. Get a tetanus shot every 10 years.     Nutrition:    Eat at least 5 servings of fruits and vegetables daily.     Eat whole-grain bread, whole-wheat pasta and brown rice instead of white grains and rice.     Get adequate Calcium and Vitamin D.     Lifestyle    Exercise for at least 150 minutes a week (30 minutes a day, 5 days a week). This will help you control your weight and prevent disease.     Limit alcohol to one drink per day. "     No smoking.     Wear sunscreen to prevent skin cancer.     See your dentist every six months for an exam and cleaning.     See your eye doctor every 1 to 2 years.

## 2019-08-11 NOTE — PROGRESS NOTES
SUBJECTIVE:   CC: Bi Bishop is an 58 year old male who presents for preventive health visit.     Healthy Habits:    Do you get at least three servings of calcium containing foods daily (dairy, green leafy vegetables, etc.)? yes    Amount of exercise or daily activities, outside of work:     Problems taking medications regularly No    Medication side effects: No    Have you had an eye exam in the past two years? yes    Do you see a dentist twice per year? yes    Do you have sleep apnea, excessive snoring or daytime drowsiness?no    - Bug bites    - Right elbow pain x1 month. No injury, but he notes changing drivers when golfing. He does use ice packs at home with some improvement.    Diabetes Follow-up  Metformin 1,000mg every day     How often are you checking your blood sugar? One time daily    What time of day are you checking your blood sugars (select all that apply)?  Before meals    Have you had any blood sugars above 200?  No    Have you had any blood sugars below 70?  No    What symptoms do you notice when your blood sugar is low?  None    What concerns do you have today about your diabetes? None     Do you have any of these symptoms? (Select all that apply)  No numbness or tingling in feet.  No redness, sores or blisters on feet.  No complaints of excessive thirst.  No reports of blurry vision.  No significant changes to weight.     Have you had a diabetic eye exam in the last 12 months? Yes- Date of last eye exam: 6/2019    Diabetes Management Resources    Hyperlipidemia Follow-Up  Simvastatin 20mg every day     Are you having any of the following symptoms? (Select all that apply)  No complaints of shortness of breath, chest pain or pressure.  No increased sweating or nausea with activity.  No left-sided neck or arm pain.  No complaints of pain in calves when walking 1-2 blocks.    Are you regularly taking any medication or supplement to lower your cholesterol?   Yes- Fish oil    Are you having muscle  aches or other side effects that you think could be caused by your cholesterol lowering medication?  No    Hypertension Follow-up  Losartan 100mg every day, amlodipine 10mg every day     Do you check your blood pressure regularly outside of the clinic? No     Are you following a low salt diet? Yes    Are your blood pressures ever more than 140 on the top number (systolic) OR more   than 90 on the bottom number (diastolic), for example 140/90? No    BP Readings from Last 2 Encounters:   08/09/19 134/84   06/04/18 120/82     Hemoglobin A1C (%)   Date Value   08/06/2019 6.2 (H)   05/24/2018 5.6     LDL Cholesterol Calculated (mg/dL)   Date Value   08/06/2019 55   05/24/2018 82     Asthma Follow-Up  Advair 250-50mcg/DOSE bid, albuterol 108mcg/ACT 2 puffs q6h prn    - Discuss changing medication, needs 90 day supply    Was ACT completed today?    Yes    ACT Total Scores 6/4/2018   ACT TOTAL SCORE -   ASTHMA ER VISITS -   ASTHMA HOSPITALIZATIONS -   ACT TOTAL SCORE (Goal Greater than or Equal to 20) 25   In the past 12 months, how many times did you visit the emergency room for your asthma without being admitted to the hospital? 0   In the past 12 months, how many times were you hospitalized overnight because of your asthma? 0       How many days per week do you miss taking your asthma controller medication?  0    Please describe any recent triggers for your asthma: None    Have you had any Emergency Room Visits, Urgent Care Visits, or Hospital Admissions since your last office visit?  No      Today's PHQ-2 Score:   PHQ-2 ( 1999 [employer]) 6/4/2018 3/15/2017   Q1: Little interest or pleasure in doing things 0 0   Q2: Feeling down, depressed or hopeless 0 0   PHQ-2 Score 0 0   Q1: Little interest or pleasure in doing things - -   Q2: Feeling down, depressed or hopeless - -   PHQ-2 Score - -       Abuse: Current or Past(Physical, Sexual or Emotional)- No  Do you feel safe in your environment? Yes    Social History     Tobacco  "Use     Smoking status: Never Smoker     Smokeless tobacco: Never Used   Substance Use Topics     Alcohol use: Yes     Comment: rare     If you drink alcohol do you typically have >3 drinks per day or >7 drinks per week? No                      Last PSA:   PSA   Date Value Ref Range Status   12/21/2009 0.66 0 - 4 ug/L Final       Reviewed orders with patient. Reviewed health maintenance and updated orders accordingly - Yes    Reviewed and updated as needed this visit by clinical staff  Tobacco  Allergies  Meds  Med Hx  Surg Hx  Fam Hx  Soc Hx        Reviewed and updated as needed this visit by Provider          ROS:  CONSTITUTIONAL: NEGATIVE for fever, chills, change in weight  INTEGUMENTARY/SKIN: NEGATIVE for worrisome rashes, moles or lesions  EYES: NEGATIVE for vision changes or irritation  ENT: NEGATIVE for ear, mouth and throat problems  RESP: NEGATIVE for significant cough or SOB  CV: NEGATIVE for chest pain, palpitations or peripheral edema  GI: NEGATIVE for nausea, abdominal pain, heartburn, or change in bowel habits  MUSCULOSKELETAL: POSITIVE for elbow pain right and right shoulder pain  NEURO: NEGATIVE for weakness, dizziness or paresthesias  PSYCHIATRIC: NEGATIVE for changes in mood or affect    This document serves as a record of the services and decisions personally performed and made by Paty Badillo MD. It was created on his behalf by Rigo Monsalve, a trained medical scribe. The creation of this document is based the provider's statements to the medical scribe.  Rigo Monsalve 12:02 PM August 9, 2019    OBJECTIVE:   /84   Pulse 68   Temp 98.4  F (36.9  C) (Tympanic)   Resp 14   Ht 1.924 m (6' 3.75\")   Wt (!) 150.3 kg (331 lb 4 oz)   BMI 40.59 kg/m    EXAM:  GENERAL: alert, pleasant and no distress  EYES: Eyes grossly normal to inspection, conjunctivae and sclerae normal  HENT: ear canals and TM's normal, nose and mouth without ulcers or lesions  NECK: no adenopathy, no asymmetry, " masses, or scars and thyroid normal to palpation  RESP: lungs clear to auscultation - no rales, rhonchi or wheezes  CV: regular rate and rhythm, normal S1 S2  ABDOMEN: soft, nontender  MS: decreased ROM, especially with abduction.   SKIN: no suspicious lesions or rashes  NEURO: Normal strength and tone, mentation intact and speech normal  PSYCH: mentation appears normal, affect normal/bright    Diagnostic Test Results:    ASSESSMENT/PLAN:   (Z00.00) Routine general medical examination at a health care facility  (primary encounter diagnosis)  Comment: Reviewed lifestyle, current conditions, medications, routine screening and labs.  Plan: Annual physical in 1 year, sooner for other health maintenance.    (E11.65) Uncontrolled type 2 diabetes mellitus without complication, without long-term current use of insulin (H)  Comment: now controlled.  Continue current medications.  Lab Results   Component Value Date    A1C 6.2 08/06/2019    A1C 5.6 05/24/2018    A1C 5.9 03/10/2017    A1C 8.4 01/04/2017    A1C 6.3 11/16/2015      Plan: Follow-up A1c in 6 months, yearly physical.    (J45.40) Moderate persistent asthma without complication  Comment: *Doing well on controller medication.  Plan: fluticasone-salmeterol (ADVAIR DISKUS) 250-50         MCG/DOSE inhaler       1 year refill.    (E66.01) Morbid obesity (H)  Comment: Body mass index is 40.59 kg/m . Discussed importance of lifestyle modifications, diet and exercise.  Plan: Review at future appointments.    (M25.511,  G89.29) Chronic right shoulder pain  Comment: Exam today is consistent with adhesive capsulitis, could not exclude rotator cuff tear.  Plan: ORTHOPEDICS ADULT REFERRAL        Advised orthopedic consultation.    Patient Instructions     Overall, you're doing well.     No change in medications.     Colonoscopy in 2021.     Will need an A1c in 6 months. This would a lab appointment, not a doctor's appointment. Please call the clinic #, or use [employer],  to set this  "up.  Call with any questions or concerns.     Yearly check up.     You have \"golfer's elbow\" keep doing watch you're doing. Think about new .    Work on the weight.     For a shoulder specialist, see Dr. Robert Dominique.     COUNSELING:  Reviewed preventive health counseling, as reflected in patient instructions    Estimated body mass index is 40.59 kg/m  as calculated from the following:    Height as of this encounter: 1.924 m (6' 3.75\").    Weight as of this encounter: 150.3 kg (331 lb 4 oz).    Weight management plan: Discussed healthy diet and exercise guidelines     reports that he has never smoked. He has never used smokeless tobacco.      Counseling Resources:  ATP IV Guidelines  Pooled Cohorts Equation Calculator  FRAX Risk Assessment  ICSI Preventive Guidelines  Dietary Guidelines for Americans, 2010  USDA's MyPlate  ASA Prophylaxis  Lung CA Screening    The information in this document, created by a scribe for me, accurately reflects the services I personally performed and the decisions made by me. I have reviewed and approved this document for accuracy.     Paty Badillo MD  [facility]"

## 2019-08-12 RX ORDER — METFORMIN HCL 500 MG
1000 TABLET, EXTENDED RELEASE 24 HR ORAL
Qty: 180 TABLET | Refills: 1 | Status: SHIPPED | OUTPATIENT
Start: 2019-08-12 | End: 2020-02-17

## 2019-08-12 NOTE — TELEPHONE ENCOUNTER
"Requested Prescriptions   Pending Prescriptions Disp Refills     metFORMIN (GLUCOPHAGE-XR) 500 MG 24 hr tablet [Pharmacy Med Name: METFORMIN HCL  MG TABLET] 16 tablet 0     Sig: TAKE 2 TABLETS (1,000 MG) BY MOUTH DAILY (WITH DINNER)  Last Written Prescription Date:  08/01/2019 #16 x 0   Last filled 08/01/2019  Last office visit: 8/9/2019 PETER Badillo   Future Office Visit:  None         Biguanide Agents Passed - 8/9/2019  5:00 PM        Passed - Blood pressure less than 140/90 in past 6 months     BP Readings from Last 3 Encounters:   08/09/19 134/84   06/04/18 120/82   03/15/17 126/80                 Passed - Patient has documented LDL within the past 12 mos.     Recent Labs   Lab Test 08/06/19  0909   LDL 55             Passed - Patient has had a Microalbumin in the past 15 mos.     Recent Labs   Lab Test 08/09/19  1037   MICROL 5   UMALCR 17.01*             Passed - Patient is age 10 or older        Passed - Patient has documented A1c within the specified period of time.     If HgbA1C is 8 or greater, it needs to be on file within the past 3 months.  If less than 8, must be on file within the past 6 months.     Recent Labs   Lab Test 08/06/19  0909   A1C 6.2*             Passed - Patient's CR is NOT>1.4 OR Patient's EGFR is NOT<45 within past 12 mos.     Recent Labs   Lab Test 08/06/19  0909   GFRESTIMATED >90   GFRESTBLACK >90       Recent Labs   Lab Test 08/06/19  0909   CR 0.91             Passed - Patient does NOT have a diagnosis of CHF.        Passed - Medication is active on med list        Passed - Recent (6 mo) or future (30 days) visit within the authorizing provider's specialty     Patient had office visit in the last 6 months or has a visit in the next 30 days with authorizing provider or within the authorizing provider's specialty.  See \"Patient Info\" tab in inbasket, or \"Choose Columns\" in Meds & Orders section of the refill encounter.              "

## 2019-08-27 DIAGNOSIS — E78.5 HYPERLIPIDEMIA LDL GOAL <100: ICD-10-CM

## 2019-08-27 NOTE — TELEPHONE ENCOUNTER
"Requested Prescriptions   Pending Prescriptions Disp Refills     simvastatin (ZOCOR) 20 MG tablet [Pharmacy Med Name: SIMVASTATIN 20 MG TABLET] 30 tablet 0     Sig: TAKE 1 TABLET (20 MG) BY MOUTH AT BEDTIME. (NEEDS FOLLOW-UP APPOINTMENT FOR THIS MEDICATION)  Last Written Prescription Date:  07/05/2019 #30 x 0  Last filled 07/05/2019  Last office visit: 8/9/2019 PETER Badillo   Future Office Visit:  None         Statins Protocol Passed - 8/27/2019  8:25 AM        Passed - LDL on file in past 12 months     Recent Labs   Lab Test 08/06/19  0909   LDL 55             Passed - No abnormal creatine kinase in past 12 months     No lab results found.             Passed - Recent (12 mo) or future (30 days) visit within the authorizing provider's specialty     Patient had office visit in the last 12 months or has a visit in the next 30 days with authorizing provider or within the authorizing provider's specialty.  See \"Patient Info\" tab in inbasket, or \"Choose Columns\" in Meds & Orders section of the refill encounter.              Passed - Medication is active on med list        Passed - Patient is age 18 or older          "

## 2019-08-28 DIAGNOSIS — I10 HYPERTENSION, BENIGN ESSENTIAL, GOAL BELOW 140/90: ICD-10-CM

## 2019-08-28 RX ORDER — SIMVASTATIN 20 MG
TABLET ORAL
Qty: 90 TABLET | Refills: 1 | Status: SHIPPED | OUTPATIENT
Start: 2019-08-28 | End: 2020-03-04

## 2019-08-28 RX ORDER — AMLODIPINE BESYLATE 10 MG/1
TABLET ORAL
Qty: 90 TABLET | Refills: 1 | Status: SHIPPED | OUTPATIENT
Start: 2019-08-28 | End: 2020-03-04

## 2019-08-28 NOTE — TELEPHONE ENCOUNTER
Routing refill request for losartan to provider for review/approval because: Labs out of range:  K+    Prescription for amlodipine approved per [facility] Refill Protocol.    Nahomi GALLOWAY RN

## 2019-08-28 NOTE — TELEPHONE ENCOUNTER
"Requested Prescriptions   Pending Prescriptions Disp Refills     losartan (COZAAR) 100 MG tablet [Pharmacy Med Name: LOSARTAN POTASSIUM 100 MG TAB]  Last Written Prescription Date:  5/31/19  Last Fill Quantity: 90,  # refills: 0   Last office visit: 8/9/2019 with prescribing provider:  jean-paul   Future Office Visit:     90 tablet 0     Sig: TAKE 1 TABLET BY MOUTH EVERY DAY       Angiotensin-II Receptors Failed - 8/28/2019  1:29 AM        Failed - Normal serum potassium on file in past 12 months     Recent Labs   Lab Test 08/06/19  0909   POTASSIUM 3.2*                    Passed - Last blood pressure under 140/90 in past 12 months     BP Readings from Last 3 Encounters:   08/09/19 134/84   06/04/18 120/82   03/15/17 126/80                 Passed - Recent (12 mo) or future (30 days) visit within the authorizing provider's specialty     Patient had office visit in the last 12 months or has a visit in the next 30 days with authorizing provider or within the authorizing provider's specialty.  See \"Patient Info\" tab in inbasket, or \"Choose Columns\" in Meds & Orders section of the refill encounter.              Passed - Medication is active on med list        Passed - Patient is age 18 or older        Passed - Normal serum creatinine on file in past 12 months     Recent Labs   Lab Test 08/06/19  0909   CR 0.91             amLODIPine (NORVASC) 10 MG tablet [Pharmacy Med Name: AMLODIPINE BESYLATE 10 MG TAB]  Last Written Prescription Date:  5/31/19  Last Fill Quantity: 90,  # refills: 0   Last office visit: 8/9/2019 with prescribing provider:  jean-paul   Future Office Visit:     90 tablet 0     Sig: TAKE 1 TABLET BY MOUTH EVERY DAY       Calcium Channel Blockers Protocol  Passed - 8/28/2019  1:29 AM        Passed - Blood pressure under 140/90 in past 12 months     BP Readings from Last 3 Encounters:   08/09/19 134/84   06/04/18 120/82   03/15/17 126/80                 Passed - Recent (12 mo) or future (30 days) visit within the " "authorizing provider's specialty     Patient had office visit in the last 12 months or has a visit in the next 30 days with authorizing provider or within the authorizing provider's specialty.  See \"Patient Info\" tab in inbasket, or \"Choose Columns\" in Meds & Orders section of the refill encounter.              Passed - Medication is active on med list        Passed - Patient is age 18 or older        Passed - Normal serum creatinine on file in past 12 months     Recent Labs   Lab Test 08/06/19  0909   CR 0.91               "

## 2019-08-28 NOTE — TELEPHONE ENCOUNTER
Prescription approved per [facility] Refill Protocol.    Nahomi GALLOWAY RN

## 2019-08-30 RX ORDER — LOSARTAN POTASSIUM 100 MG/1
TABLET ORAL
Qty: 90 TABLET | Refills: 1 | Status: SHIPPED | OUTPATIENT
Start: 2019-08-30 | End: 2020-03-04

## 2019-09-25 ENCOUNTER — TRANSFERRED RECORDS (OUTPATIENT)
Dept: HEALTH INFORMATION MANAGEMENT | Facility: CLINIC | Age: 59
End: 2019-09-25

## 2019-10-03 ENCOUNTER — THERAPY VISIT (OUTPATIENT)
Dept: PHYSICAL THERAPY | Facility: CLINIC | Age: 59
End: 2019-10-03
Payer: COMMERCIAL

## 2019-10-03 DIAGNOSIS — M25.511 CHRONIC PAIN IN RIGHT SHOULDER: Primary | ICD-10-CM

## 2019-10-03 DIAGNOSIS — G89.29 CHRONIC PAIN IN RIGHT SHOULDER: Primary | ICD-10-CM

## 2019-10-03 PROCEDURE — 97161 PT EVAL LOW COMPLEX 20 MIN: CPT | Mod: GP | Performed by: PHYSICAL THERAPIST

## 2019-10-03 PROCEDURE — 97110 THERAPEUTIC EXERCISES: CPT | Mod: GP | Performed by: PHYSICAL THERAPIST

## 2019-10-11 ENCOUNTER — THERAPY VISIT (OUTPATIENT)
Dept: PHYSICAL THERAPY | Facility: CLINIC | Age: 59
End: 2019-10-11
Payer: COMMERCIAL

## 2019-10-11 DIAGNOSIS — G89.29 CHRONIC RIGHT SHOULDER PAIN: Primary | ICD-10-CM

## 2019-10-11 DIAGNOSIS — M25.511 CHRONIC RIGHT SHOULDER PAIN: Primary | ICD-10-CM

## 2019-10-11 PROCEDURE — 97112 NEUROMUSCULAR REEDUCATION: CPT | Mod: GP | Performed by: PHYSICAL THERAPIST

## 2019-10-11 PROCEDURE — 97110 THERAPEUTIC EXERCISES: CPT | Mod: GP | Performed by: PHYSICAL THERAPIST
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
## 2019-10-29 NOTE — PATIENT INSTRUCTIONS
Before Your Surgery      Call your surgeon if there is any change in your health. This includes signs of a cold or flu (such as a sore throat, runny nose, cough, rash or fever).    Do not smoke, drink alcohol or take over the counter medicine (unless your surgeon or primary care doctor tells you to) for the 24 hours before and after surgery.    If you take prescribed drugs: Follow your doctor s orders about which medicines to take and which to stop until after surgery.    Eating and drinking prior to surgery: follow the instructions from your surgeon    Take a shower or bath the night before surgery. Use the soap your surgeon gave you to gently clean your skin. If you do not have soap from your surgeon, use your regular soap. Do not shave or scrub the surgery site.  Wear clean pajamas and have clean sheets on your bed.       *    Stop the 81 mg aspirin now. You may restart the aspirin after surgery.   *   Sounds like day surgery.   *   Don't take the morning metformin.   *   Stop the ibuprofen 24 hours before surgery.   *    May take your other medications as prescribed.

## 2019-10-29 NOTE — PROGRESS NOTES
160/80  [facility]  [address] [facility] [address]  [phone]  Dept: [phone]    PRE-OP EVALUATION:  Today's date: 2019    Bi Bishop (: 1960) presents for pre-operative evaluation assessment as requested by Dr. Dominique.  He requires evaluation and anesthesia risk assessment prior to undergoing surgery/procedure for treatment of right shoulder.    Proposed Surgery/ Procedure: Right Shoulder Arthroscopic Extensive Glenohumeral Debridement with Loose Body Removal, Subacromial Decompression, AC Joint Resection and Open Subpectoralis Biceps Resection.  Date of Surgery/ Procedure: 2019  Time of Surgery/ Procedure: 12:40PM  Hospital/Surgical Facility: [facility] in [place]  Fax number for surgical facility: [phone]  Primary Physician: Paty Badillo  Type of Anesthesia Anticipated: General    Patient has a Health Care Directive or Living Will:  NO    1. NO - Do you have a history of heart attack, stroke, stent, bypass or surgery on an artery in the head, neck, heart or legs?  2. NO - Do you ever have any pain or discomfort in your chest?  3. NO - Do you have a history of  Heart Failure?  4. NO - Are you troubled by shortness of breath when: walking on the level, up a slight hill or at night?  5. NO - Do you currently have a cold, bronchitis or other respiratory infection?  6. NO - Do you have a cough, shortness of breath or wheezing?  7. NO - Do you sometimes get pains in the calves of your legs when you walk?  8. NO - Do you or anyone in your family have previous history of blood clots?  9. NO - Do you or does anyone in your family have a serious bleeding problem such as prolonged bleeding following surgeries or cuts?  10. NO - Have you ever had problems with anemia or been told to take iron pills?  11. NO - Have you had any abnormal blood loss such as black, tarry or bloody stools, or abnormal vaginal bleeding?  12. NO -  Have you ever had a blood transfusion?  13. NO - Have you or any of your relatives ever had problems with anesthesia?  14. NO - Do you have sleep apnea, excessive snoring or daytime drowsiness?  15. NO - Do you have any prosthetic heart valves?  16. NO - Do you have prosthetic joints?  17. NO - Is there any chance that you may be pregnant?      HPI:     HPI related to upcoming procedure:Bi Bishop is a left hand dominant, pleasant individual, presenting today for follow up evaluation with regards to his right shoulder. Patient was last seen on 9/25/19 where he was diagnosed with advanced glenohumeral osteoarthritis. It was recommended he obtain a CT scan to further assess glenoid version and depth. Patient has started formal physical therapy with [facility] and is compliant with his home exercise program. Patient reports some pain relief with physical therapy but still has popping, clicking and catching in the right shoulder. ASES shoulder/elbow pain scale - 2/10.         See problem list for active medical problems.  Problems all longstanding and stable, except as noted/documented.  See ROS for pertinent symptoms related to these conditions.      MEDICAL HISTORY:     Patient Active Problem List    Diagnosis Date Noted     Morbid obesity (H) 08/09/2019     Priority: Medium     Hyperlipidemia LDL goal <100 03/16/2017     Priority: Medium     Controlled type 2 diabetes mellitus, without long-term current use of insulin (H) 01/07/2017     Priority: Medium     Advanced directives, counseling/discussion 01/04/2017     Priority: Medium     Advance Care Planning 1/4/2017: Information declined             Elevated fasting blood sugar 06/03/2015     Priority: Medium     Hypokalemia 06/03/2015     Priority: Medium     Degenerative joint disease of knee, left 06/03/2013     Priority: Medium     24 hour contact handout given 05/02/2012     Priority: Medium     EMERGENCY CARE PLAN  Presenting Problem Signs and Symptoms  Treatment Plan    Questions or conerns during clinic hours    I will call the clinic directly     Questions or conerns outside clinic hours    I will call the 24 hour nurse line at [phone]    Patient needs to schedule an appointment    I will call the 24 hour scheduling team at [phone] or clinic directly    Same day treatment     I will call the clinic first, nurse line if after hours, urgent care and express care if needed                                 Hypertension, benign essential, goal below 140/90 04/21/2011     Priority: Medium     December 14, 2012 within guide lines on high dose ace inhibitor, HCTZ, and norvasc. Will repeat echo next year. If there is still LVH on echo, consider adding a beta blocker       CARDIOVASCULAR SCREENING; LDL GOAL LESS THAN 130 10/31/2010     Priority: Medium     LVH (left ventricular hypertrophy) 10/20/2010     Priority: Medium     December 14, 2012 seen on echo, 2010. On ace inhibitor, not  beta blocker given asthma. Will repeat echo in 1 year.        Moderate persistent asthma 06/18/2008     Priority: Medium     April 7, 2014   well controlled on Advair, using albuterol inhaler rarely. One year refill. Note: other control medications, such as Symbicort, were not as effective.       Erectile dysfuntion 06/18/2008     Priority: Medium     April 4, 2014  using Levitra. No contraindications.       GERD (gastroesophageal reflux disease) 06/18/2008     Priority: Medium     April 4, 2014  Doing well on chronic PPI therapy.         Past Medical History:   Diagnosis Date     Allergic urticaria 1/15/08     Hypertension goal BP (blood pressure) < 130/80 4/21/2011     Obesity, unspecified 1/15/08     Uncontrolled type 2 diabetes mellitus without complication, without long-term current use of insulin (H) 1/7/2017     Past Surgical History:   Procedure Laterality Date     COLONOSCOPY  5/26/2011    Procedure:COMBINED COLONOSCOPY, REMOVE TUMOR/POLYP/LESION BY SNARE; Surgeon:REGINA  BRIANNA TIM; Location:[facility]     Current Outpatient Medications   Medication Sig Dispense Refill     amLODIPine (NORVASC) 10 MG tablet TAKE 1 TABLET BY MOUTH EVERY DAY 90 tablet 1     Ascorbic Acid (VITAMIN C CR PO) Take 1,000 mg by mouth daily.       aspirin 81 MG chewable tablet Take 81 mg by mouth daily.       cetirizine (ZYRTEC) 10 MG tablet Take 10 mg by mouth daily.       Cholecalciferol (VITAMIN D) 1000 UNIT capsule Take 1 capsule by mouth daily.       fluticasone-salmeterol (ADVAIR DISKUS) 250-50 MCG/DOSE inhaler INHALE 1 PUFF INTO THE LUNGS EVERY 12 HOURS 3 Inhaler 3     HYDROmorphone (DILAUDID) 4 MG tablet Take 1 tablet (4 mg) by mouth every 6 hours as needed for pain 15 tablet 0     losartan (COZAAR) 100 MG tablet TAKE 1 TABLET BY MOUTH EVERY DAY 90 tablet 1     metFORMIN (GLUCOPHAGE-XR) 500 MG 24 hr tablet TAKE 2 TABLETS (1,000 MG) BY MOUTH DAILY (WITH DINNER) 180 tablet 1     MULTI-VITAMIN OR Once daily       Omega-3 Fatty Acids (FISH OIL PO) Take  by mouth 2 times daily.       omeprazole (PRILOSEC) 20 MG capsule Take 20 mg by mouth daily.       simvastatin (ZOCOR) 20 MG tablet TAKE 1 TABLET (20 MG) BY MOUTH AT BEDTIME. 90 tablet 1     albuterol (PROVENTIL HFA) 108 (90 BASE) MCG/ACT Inhaler Inhale 2 puffs into the lungs every 6 hours as needed for shortness of breath / dyspnea 3 Inhaler 3     blood glucose (NO BRAND SPECIFIED) test strip Use to test blood sugar 1-3 times daily or as directed. To accompany: Blood Glucose Monitor Brands: per insurance. 300 strip 3     blood glucose monitoring (ONE TOUCH DELICA) lancets Use to test blood sugars 1-3 times daily 1 Box 3     blood glucose monitoring (ONETOUCH VERIO) meter device kit Use to test blood sugars 1-3 times daily 1 kit 0     ONETOUCH VERIO IQ test strip USE TO TEST 3 TIMES DAILY. 300 strip 3     vardenafil (LEVITRA) 20 MG tablet Take 0.5-1 tablets by mouth daily as needed for erectile dysfunction. 6 tablet 1     OTC products: herbals and vitamins -  "Fish oil and Aspirin    No Known Allergies   Latex Allergy: NO    Social History     Tobacco Use     Smoking status: Never Smoker     Smokeless tobacco: Never Used   Substance Use Topics     Alcohol use: Yes     Comment: rare     History   Drug Use No       REVIEW OF SYSTEMS:   CONSTITUTIONAL: NEGATIVE for fever, chills, change in weight  INTEGUMENTARY/SKIN: NEGATIVE for worrisome rashes, moles or lesions  EYES: NEGATIVE for vision changes or irritation  ENT/MOUTH: NEGATIVE for ear, mouth and throat problems  RESP: NEGATIVE for significant cough or SOB  CV: NEGATIVE for chest pain, palpitations or peripheral edema  GI: NEGATIVE for nausea, abdominal pain, heartburn, or change in bowel habits  : NEGATIVE for frequency, dysuria, or hematuria  MUSCULOSKELETAL: POSITIVE for right shoulder pain  NEURO: NEGATIVE for weakness, dizziness or paresthesias  ENDOCRINE: NEGATIVE for temperature intolerance, skin/hair changes  HEME: NEGATIVE for bleeding problems  PSYCHIATRIC: NEGATIVE for changes in mood or affect    This document serves as a record of the services and decisions personally performed and made by Paty Badillo MD. It was created on his behalf by Rigo Monsalve, a trained medical scribe. The creation of this document is based the provider's statements to the medical scribe.  Rigo Monsalve 2:25 PM November 13, 2019    EXAM:   BP (!) 161/92   Pulse 97   Temp 97.6  F (36.4  C) (Tympanic)   Resp 20   Ht 1.924 m (6' 3.75\")   Wt 148.3 kg (327 lb)   BMI 40.07 kg/m      GENERAL APPEARANCE: alert, pleasant and no distress     EYES: EOMI,  PERRL     HENT: ear canals and TM's normal and nose and mouth without ulcers or lesions     NECK: no adenopathy, no asymmetry, masses, or scars and thyroid normal to palpation     RESP: lungs clear to auscultation - no rales, rhonchi or wheezes     CV: regular rates and rhythm, normal S1 S2     ABDOMEN:  soft, nontender     MS: extremities normal- no gross deformities noted, no " evidence of inflammation in joints, FROM in all extremities.     SKIN: no suspicious lesions or rashes     NEURO: Normal strength and tone, sensory exam grossly normal, mentation intact and speech normal     PSYCH: mentation appears normal. and affect normal/bright     LYMPHATICS: No cervical adenopathy    DIAGNOSTICS:   EKG: sinus, non specific QRS widening, no LVH by voltage criteria, no acute ischemia changes.   Lab Results:    Results for orders placed or performed in visit on 11/13/19   CBC with platelets     Status: None   Result Value Ref Range    WBC 6.4 4.0 - 11.0 10e9/L    RBC Count 5.18 4.4 - 5.9 10e12/L    Hemoglobin 15.1 13.3 - 17.7 g/dL    Hematocrit 43.9 40.0 - 53.0 %    MCV 85 78 - 100 fl    MCH 29.2 26.5 - 33.0 pg    MCHC 34.4 31.5 - 36.5 g/dL    RDW 13.0 10.0 - 15.0 %    Platelet Count 243 150 - 450 10e9/L   Basic metabolic panel  (Ca, Cl, CO2, Creat, Gluc, K, Na, BUN)     Status: Abnormal   Result Value Ref Range    Sodium 138 133 - 144 mmol/L    Potassium 3.1 (L) 3.4 - 5.3 mmol/L    Chloride 102 94 - 109 mmol/L    Carbon Dioxide 33 (H) 20 - 32 mmol/L    Anion Gap 3 3 - 14 mmol/L    Glucose 164 (H) 70 - 99 mg/dL    Urea Nitrogen 17 7 - 30 mg/dL    Creatinine 0.96 0.66 - 1.25 mg/dL    GFR Estimate 85 >60 mL/min/[1.73_m2]    GFR Estimate If Black >90 >60 mL/min/[1.73_m2]    Calcium 8.9 8.5 - 10.1 mg/dL   **A1C FUTURE anytime     Status: Abnormal   Result Value Ref Range    Hemoglobin A1C 6.6 (H) 0 - 5.6 %       IMPRESSION:     The proposed surgical procedure is considered INTERMEDIATE risk.    REVISED CARDIAC RISK INDEX  The patient has the following serious cardiovascular risks for perioperative complications such as (MI, PE, VFib and 3  AV Block):  No serious cardiac risks  INTERPRETATION: 0 risks: Class I (very low risk - 0.4% complication rate)    The patient has the following additional risks for perioperative complications:  Morbid obesity    (Z01.818) Preop general physical exam  (primary  encounter diagnosis)  Comment: No absolute contraindications.  Plan: EKG 12-lead complete w/read - Clinics, CBC with        platelets, Basic metabolic panel  (Ca, Cl, CO2,        Creat, Gluc, K, Na, BUN)          (M19.011) Osteoarthritis of right shoulder with posterior wear pattern with loose bodies  Comment: Agree with surgery.      (M75.41) Impingement syndrome of shoulder region, right      (M19.011) Arthritis of right acromioclavicular joint      (M75.21) Biceps tendinitis of right upper extremity      (E11.9) Controlled type 2 diabetes mellitus, without long-term current use of insulin (H)  Comment: Within guidelines using single drug therapy, metformin.  Plan: Continue.    (I51.7) LVH (left ventricular hypertrophy)  Comment: Based on echocardiogram from 2010.  No evidence of diastolic or systolic dysfunction.  On ACE inhibitor and amlodipine.  Plan: Considering adding beta-blocker therapy at follow-up visit.    (I10) Hypertension, benign essential, goal below 140/90  Comment: Elevated, which should be acceptable for surgery.  Plan: Continue current medications.  Will not hold ARB prior to surgery.    (J45.40) Moderate persistent asthma without complication  Comment: Doing well with consistent use of his controller medication.  Plan: Asthma Action Plan (AAP)        (E66.01) Morbid obesity (H)  Comment: Body mass index is 40.07 kg/m .  Plan: Lifestyle modifications, diet and exercise.      (E87.6) Hypokalemia  Comment: Etiology unclear, not on diuretic therapy.  Plan: We will add oral potassium supplement.        RECOMMENDATIONS:         --Patient is to take all scheduled medications on the day of surgery EXCEPT for modifications listed below.  Wrap  Diabetes Medication Use  -----Hold usual oral and non-insulin diabetic meds (e.g. Metformin, Actos, Glipizide) while NPO.     Anticoagulant or Antiplatelet Medication Use  ASPIRIN: Discontinue ASA 7-10 days prior to procedure to reduce bleeding risk.  It should be  resumed post-operatively.        APPROVAL GIVEN to proceed with proposed procedure, without further diagnostic evaluation     The information in this document, created by a scribe for me, accurately reflects the services I personally performed and the decisions made by me. I have reviewed and approved this document for accuracy.     Signed Electronically by: Paty Badillo MD    Copy of this evaluation report is provided to requesting physician.    Street Preop Guidelines    Revised Cardiac Risk Index

## 2019-11-13 ENCOUNTER — OFFICE VISIT (OUTPATIENT)
Dept: FAMILY MEDICINE | Facility: CLINIC | Age: 59
End: 2019-11-13
Payer: COMMERCIAL

## 2019-11-13 VITALS
WEIGHT: 315 LBS | HEIGHT: 76 IN | DIASTOLIC BLOOD PRESSURE: 90 MMHG | SYSTOLIC BLOOD PRESSURE: 158 MMHG | TEMPERATURE: 97.6 F | HEART RATE: 97 BPM | RESPIRATION RATE: 20 BRPM | BODY MASS INDEX: 38.36 KG/M2

## 2019-11-13 DIAGNOSIS — J45.40 MODERATE PERSISTENT ASTHMA WITHOUT COMPLICATION: ICD-10-CM

## 2019-11-13 DIAGNOSIS — I51.7 LVH (LEFT VENTRICULAR HYPERTROPHY): ICD-10-CM

## 2019-11-13 DIAGNOSIS — Z01.818 PREOP GENERAL PHYSICAL EXAM: Primary | ICD-10-CM

## 2019-11-13 DIAGNOSIS — E87.6 HYPOKALEMIA: ICD-10-CM

## 2019-11-13 DIAGNOSIS — M75.41 IMPINGEMENT SYNDROME OF SHOULDER REGION, RIGHT: ICD-10-CM

## 2019-11-13 DIAGNOSIS — M19.011 OSTEOARTHRITIS OF RIGHT SHOULDER, UNSPECIFIED OSTEOARTHRITIS TYPE: ICD-10-CM

## 2019-11-13 DIAGNOSIS — M19.011 ARTHRITIS OF RIGHT ACROMIOCLAVICULAR JOINT: ICD-10-CM

## 2019-11-13 DIAGNOSIS — M75.21 BICEPS TENDINITIS OF RIGHT UPPER EXTREMITY: ICD-10-CM

## 2019-11-13 DIAGNOSIS — I10 HYPERTENSION, BENIGN ESSENTIAL, GOAL BELOW 140/90: ICD-10-CM

## 2019-11-13 DIAGNOSIS — E11.9 CONTROLLED TYPE 2 DIABETES MELLITUS WITHOUT COMPLICATION, WITHOUT LONG-TERM CURRENT USE OF INSULIN (H): ICD-10-CM

## 2019-11-13 DIAGNOSIS — E66.01 MORBID OBESITY (H): ICD-10-CM

## 2019-11-13 LAB
ANION GAP SERPL CALCULATED.3IONS-SCNC: 3 MMOL/L (ref 3–14)
BUN SERPL-MCNC: 17 MG/DL (ref 7–30)
CALCIUM SERPL-MCNC: 8.9 MG/DL (ref 8.5–10.1)
CHLORIDE SERPL-SCNC: 102 MMOL/L (ref 94–109)
CO2 SERPL-SCNC: 33 MMOL/L (ref 20–32)
CREAT SERPL-MCNC: 0.96 MG/DL (ref 0.66–1.25)
ERYTHROCYTE [DISTWIDTH] IN BLOOD BY AUTOMATED COUNT: 13 % (ref 10–15)
GFR SERPL CREATININE-BSD FRML MDRD: 85 ML/MIN/{1.73_M2}
GLUCOSE SERPL-MCNC: 164 MG/DL (ref 70–99)
HBA1C MFR BLD: 6.6 % (ref 0–5.6)
HCT VFR BLD AUTO: 43.9 % (ref 40–53)
HGB BLD-MCNC: 15.1 G/DL (ref 13.3–17.7)
MCH RBC QN AUTO: 29.2 PG (ref 26.5–33)
MCHC RBC AUTO-ENTMCNC: 34.4 G/DL (ref 31.5–36.5)
MCV RBC AUTO: 85 FL (ref 78–100)
PLATELET # BLD AUTO: 243 10E9/L (ref 150–450)
POTASSIUM SERPL-SCNC: 3.1 MMOL/L (ref 3.4–5.3)
RBC # BLD AUTO: 5.18 10E12/L (ref 4.4–5.9)
SODIUM SERPL-SCNC: 138 MMOL/L (ref 133–144)
WBC # BLD AUTO: 6.4 10E9/L (ref 4–11)

## 2019-11-13 PROCEDURE — 93000 ELECTROCARDIOGRAM COMPLETE: CPT | Performed by: FAMILY MEDICINE

## 2019-11-13 PROCEDURE — 80048 BASIC METABOLIC PNL TOTAL CA: CPT | Performed by: FAMILY MEDICINE

## 2019-11-13 PROCEDURE — 85027 COMPLETE CBC AUTOMATED: CPT | Performed by: FAMILY MEDICINE

## 2019-11-13 PROCEDURE — 83036 HEMOGLOBIN GLYCOSYLATED A1C: CPT | Performed by: FAMILY MEDICINE

## 2019-11-13 PROCEDURE — 36415 COLL VENOUS BLD VENIPUNCTURE: CPT | Performed by: FAMILY MEDICINE

## 2019-11-13 PROCEDURE — 99215 OFFICE O/P EST HI 40 MIN: CPT | Performed by: FAMILY MEDICINE

## 2019-11-13 ASSESSMENT — MIFFLIN-ST. JEOR: SCORE: 2385.79

## 2019-11-13 ASSESSMENT — PAIN SCALES - GENERAL: PAINLEVEL: NO PAIN (0)

## 2019-11-13 NOTE — LETTER
November 13, 2019      Bi Bishop  [address] [address]        The letter is to state that Bi Bishop is having surgery and a power lift chair is medically necessary.         Sincerely,        Paty Badillo MD

## 2019-11-13 NOTE — LETTER
My Asthma Action Plan    Name: Bi Bishop   YOB: 1960  Date: 11/13/2019   My doctor: Paty Badillo MD   My clinic: [facility]        My Control Medicine: Advair 250-50mcg, inhale 1 puff 2 times per day  My Rescue Medicine: Albuterol (Proair/Ventolin/Proventil HFA) 2-4 puffs EVERY 4 HOURS as needed. Use a spacer if recommended by your provider.   My Asthma Severity:   Moderate Persistent  Know your asthma triggers: See enclosed list  None            GREEN ZONE   Good Control    I feel good    No cough or wheeze    Can work, sleep and play without asthma symptoms       Take your asthma control medicine every day.     1. If exercise triggers your asthma, take your rescue medication    15 minutes before exercise or sports, and    During exercise if you have asthma symptoms  2. Spacer to use with inhaler: If you have a spacer, make sure to use it with your inhaler             YELLOW ZONE Getting Worse  I have ANY of these:    I do not feel good    Cough or wheeze    Chest feels tight    Wake up at night   1. Keep taking your Green Zone medications  2. Start taking your rescue medicine:    every 20 minutes for up to 1 hour. Then every 4 hours for 24-48 hours.  3. If you stay in the Yellow Zone for more than 12-24 hours, contact your doctor.  4. If you do not return to the Green Zone in 12-24 hours or you get worse, start taking your oral steroid medicine if prescribed by your provider.           RED ZONE Medical Alert - Get Help  I have ANY of these:    I feel awful    Medicine is not helping    Breathing getting harder    Trouble walking or talking    Nose opens wide to breathe       1. Take your rescue medicine NOW  2. If your provider has prescribed an oral steroid medicine, start taking it NOW  3. Call your doctor NOW  4. If you are still in the Red Zone after 20 minutes and you have not reached your doctor:    Take your rescue medicine again and    Call 911 or go to the  emergency room right away    See your regular doctor within 2 weeks of an Emergency Room or Urgent Care visit for follow-up treatment.          Annual Reminders:  Meet with Asthma Educator,  Flu Shot in the Fall, consider Pneumonia Vaccination for patients with asthma (aged 19 and older).    Pharmacy:    [facility] [place] [id number] [facility]  [facility] [address] [place] [facility] - [address]                          Asthma Triggers  How To Control Things That Make Your Asthma Worse    Triggers are things that make your asthma worse.  Look at the list below to help you find your triggers and what you can do about them.  You can help prevent asthma flare-ups by staying away from your triggers.      Trigger                                                          What you can do   Cigarette Smoke  Tobacco smoke can make asthma worse. Do not allow smoking in your home, car or around you.  Be sure no one smokes at a child s day care or school.  If you smoke, ask your health care provider for ways to help you quit.  Ask family members to quit too.  Ask your health care provider for a referral to Quit Plan to help you quit smoking, or call 3-997-251-PLAN.     Colds, Flu, Bronchitis  These are common triggers of asthma. Wash your hands often.  Don t touch your eyes, nose or mouth.  Get a flu shot every year.     Dust Mites  These are tiny bugs that live in cloth or carpet. They are too small to see. Wash sheets and blankets in hot water every week.   Encase pillows and mattress in dust mite proof covers.  Avoid having carpet if you can. If you have carpet, vacuum weekly.   Use a dust mask and HEPA vacuum.   Pollen and Outdoor Mold  Some people are allergic to trees, grass, or weed pollen, or molds. Try to keep your windows closed.  Limit time out doors when pollen count is high.   Ask you health care provider about taking medicine during allergy season.     Animal Dander  Some people are  allergic to skin flakes, urine or saliva from pets with fur or feathers. Keep pets with fur or feathers out of your home.    If you can t keep the pet outdoors, then keep the pet out of your bedroom.  Keep the bedroom door closed.  Keep pets off cloth furniture and away from stuffed toys.     Mice, Rats, and Cockroaches   Some people are allergic to the waste from these pests.   Cover food and garbage.  Clean up spills and food crumbs.  Store grease in the refrigerator.   Keep food out of the bedroom.   Indoor Mold  This can be a trigger if your home has high moisture. Fix leaking faucets, pipes, or other sources of water.   Clean moldy surfaces.  Dehumidify basement if it is damp and smelly.   Smoke, Strong Odors, and Sprays  These can reduce air quality. Stay away from strong odors and sprays, such as perfume, powder, hair spray, paints, smoke incense, paint, cleaning products, candles and new carpet.   Exercise or Sports  Some people with asthma have this trigger. Be active!  Ask your doctor about taking medicine before sports or exercise to prevent symptoms.    Warm up for 5-10 minutes before and after sports or exercise.     Other Triggers of Asthma  Cold air:  Cover your nose and mouth with a scarf.  Sometimes laughing or crying can be a trigger.  Some medicines and food can trigger asthma.

## 2019-11-15 RX ORDER — POTASSIUM CHLORIDE 1500 MG/1
20 TABLET, EXTENDED RELEASE ORAL DAILY
Qty: 90 TABLET | Refills: 3 | Status: SHIPPED | OUTPATIENT
Start: 2019-11-15 | End: 2020-10-05

## 2019-12-20 ENCOUNTER — THERAPY VISIT (OUTPATIENT)
Dept: PHYSICAL THERAPY | Facility: CLINIC | Age: 59
End: 2019-12-20
Payer: COMMERCIAL

## 2019-12-20 DIAGNOSIS — M25.511 ACUTE POSTOPERATIVE PAIN OF RIGHT SHOULDER: Primary | ICD-10-CM

## 2019-12-20 DIAGNOSIS — G89.18 ACUTE POSTOPERATIVE PAIN OF RIGHT SHOULDER: Primary | ICD-10-CM

## 2019-12-20 PROCEDURE — 97110 THERAPEUTIC EXERCISES: CPT | Mod: GP | Performed by: PHYSICAL THERAPIST

## 2019-12-20 PROCEDURE — 97112 NEUROMUSCULAR REEDUCATION: CPT | Mod: GP | Performed by: PHYSICAL THERAPIST

## 2020-01-20 ENCOUNTER — THERAPY VISIT (OUTPATIENT)
Dept: PHYSICAL THERAPY | Facility: CLINIC | Age: 60
End: 2020-01-20
Payer: COMMERCIAL

## 2020-01-20 DIAGNOSIS — G89.18 ACUTE POSTOPERATIVE PAIN OF RIGHT SHOULDER: Primary | ICD-10-CM

## 2020-01-20 DIAGNOSIS — M25.511 ACUTE POSTOPERATIVE PAIN OF RIGHT SHOULDER: Primary | ICD-10-CM

## 2020-01-20 PROCEDURE — 97110 THERAPEUTIC EXERCISES: CPT | Mod: GP | Performed by: PHYSICAL THERAPIST

## 2020-01-20 PROCEDURE — 97112 NEUROMUSCULAR REEDUCATION: CPT | Mod: GP | Performed by: PHYSICAL THERAPIST

## 2020-02-17 ENCOUNTER — THERAPY VISIT (OUTPATIENT)
Dept: PHYSICAL THERAPY | Facility: CLINIC | Age: 60
End: 2020-02-17
Payer: COMMERCIAL

## 2020-02-17 DIAGNOSIS — M25.511 ACUTE POSTOPERATIVE PAIN OF RIGHT SHOULDER: Primary | ICD-10-CM

## 2020-02-17 DIAGNOSIS — G89.18 ACUTE POSTOPERATIVE PAIN OF RIGHT SHOULDER: Primary | ICD-10-CM

## 2020-02-17 DIAGNOSIS — R73.01 ELEVATED FASTING BLOOD SUGAR: ICD-10-CM

## 2020-02-17 PROCEDURE — 97112 NEUROMUSCULAR REEDUCATION: CPT | Mod: GP | Performed by: PHYSICAL THERAPIST

## 2020-02-17 PROCEDURE — 97110 THERAPEUTIC EXERCISES: CPT | Mod: GP | Performed by: PHYSICAL THERAPIST

## 2020-02-17 RX ORDER — METFORMIN HCL 500 MG
1000 TABLET, EXTENDED RELEASE 24 HR ORAL
Qty: 180 TABLET | Refills: 1 | Status: SHIPPED | OUTPATIENT
Start: 2020-02-17 | End: 2020-08-10

## 2020-02-17 NOTE — TELEPHONE ENCOUNTER
Prescription approved per [facility] Refill Protocol or patient Primary care provider (PCP) Chart and last OV reviewed   SHANITA Mclean RN/Boo Tavarez

## 2020-02-17 NOTE — TELEPHONE ENCOUNTER
"Requested Prescriptions   Pending Prescriptions Disp Refills     metFORMIN (GLUCOPHAGE-XR) 500 MG 24 hr tablet [Pharmacy Med Name: METFORMIN HCL  MG TABLET] 180 tablet 1     Sig: TAKE 2 TABLETS (1,000 MG) BY MOUTH DAILY (WITH DINNER)       Biguanide Agents Failed - 2/17/2020 12:15 PM        Failed - Blood pressure less than 140/90 in past 6 months     BP Readings from Last 3 Encounters:   11/13/19 (!) 158/90   08/09/19 134/84   06/04/18 120/82                 Passed - Patient has documented LDL within the past 12 mos.     Recent Labs   Lab Test 08/06/19  0909   LDL 55             Passed - Patient has had a Microalbumin in the past 15 mos.     Recent Labs   Lab Test 08/09/19  1037   MICROL 5   UMALCR 17.01*             Passed - Patient is age 10 or older        Passed - Patient has documented A1c within the specified period of time.     If HgbA1C is 8 or greater, it needs to be on file within the past 3 months.  If less than 8, must be on file within the past 6 months.     Recent Labs   Lab Test 11/13/19  1413   A1C 6.6*             Passed - Patient's CR is NOT>1.4 OR Patient's EGFR is NOT<45 within past 12 mos.     Recent Labs   Lab Test 11/13/19  1413   GFRESTIMATED 85   GFRESTBLACK >90       Recent Labs   Lab Test 11/13/19  1413   CR 0.96             Passed - Patient does NOT have a diagnosis of CHF.        Passed - Medication is active on med list        Passed - Recent (6 mo) or future (30 days) visit within the authorizing provider's specialty     Patient had office visit in the last 6 months or has a visit in the next 30 days with authorizing provider or within the authorizing provider's specialty.  See \"Patient Info\" tab in inbasket, or \"Choose Columns\" in Meds & Orders section of the refill encounter.            Last Written Prescription Date:  8/12/19  Last Fill Quantity: 180,  # refills: 1   Last office visit: 11/13/2019 with prescribing provider:  Paty Badillo     Future Office Visit:      "

## 2020-02-23 ENCOUNTER — HEALTH MAINTENANCE LETTER (OUTPATIENT)
Age: 60
End: 2020-02-23

## 2020-03-04 DIAGNOSIS — E78.5 HYPERLIPIDEMIA LDL GOAL <100: ICD-10-CM

## 2020-03-04 DIAGNOSIS — I10 HYPERTENSION, BENIGN ESSENTIAL, GOAL BELOW 140/90: ICD-10-CM

## 2020-03-04 RX ORDER — LOSARTAN POTASSIUM 100 MG/1
TABLET ORAL
Qty: 90 TABLET | Refills: 1 | Status: SHIPPED | OUTPATIENT
Start: 2020-03-04 | End: 2020-08-26

## 2020-03-04 RX ORDER — SIMVASTATIN 20 MG
TABLET ORAL
Qty: 90 TABLET | Refills: 1 | Status: SHIPPED | OUTPATIENT
Start: 2020-03-04 | End: 2020-08-26

## 2020-03-04 RX ORDER — AMLODIPINE BESYLATE 10 MG/1
TABLET ORAL
Qty: 90 TABLET | Refills: 1 | Status: SHIPPED | OUTPATIENT
Start: 2020-03-04 | End: 2020-08-26

## 2020-03-04 NOTE — TELEPHONE ENCOUNTER
Called and spoke with patient  Future appt set up  Through his wife works at [employer] he was just enrolled in [employer] a program sponsored by [facility]  They provide him with a BG monitor and supplies, BP cuff and  He purchased a scale  He has to sent reading everyday to them   I asked him to bring the readings to appt   To call if need sooner   He said he has gained weight   SHANITA Mclean RN/Boo Tavarez        Prescription approved per [facility] Refill Protocol or patient Primary care provider (PCP) Chart and last OV reviewed   SHANITA Mclean RN/Boo Tavarez

## 2020-03-04 NOTE — TELEPHONE ENCOUNTER
"Requested Prescriptions   Pending Prescriptions Disp Refills     amLODIPine (NORVASC) 10 MG tablet [Pharmacy Med Name: AMLODIPINE BESYLATE 10 MG TAB]  Last Written Prescription Date:  8/28/19  Last Fill Quantity: 90,  # refills: 1   Last office visit: 11/13/2019 with prescribing provider:  jean-paul   Future Office Visit:     90 tablet 1     Sig: TAKE 1 TABLET BY MOUTH EVERY DAY       Calcium Channel Blockers Protocol  Failed - 3/4/2020  1:59 AM        Failed - Blood pressure under 140/90 in past 12 months     BP Readings from Last 3 Encounters:   11/13/19 (!) 158/90   08/09/19 134/84   06/04/18 120/82                 Passed - Recent (12 mo) or future (30 days) visit within the authorizing provider's specialty     Patient has had an office visit with the authorizing provider or a provider within the authorizing providers department within the previous 12 mos or has a future within next 30 days. See \"Patient Info\" tab in inbasket, or \"Choose Columns\" in Meds & Orders section of the refill encounter.              Passed - Medication is active on med list        Passed - Patient is age 18 or older        Passed - Normal serum creatinine on file in past 12 months     Recent Labs   Lab Test 11/13/19  1413   CR 0.96             simvastatin (ZOCOR) 20 MG tablet [Pharmacy Med Name: SIMVASTATIN 20 MG TABLET]  Last Written Prescription Date:  8/28/19  Last Fill Quantity: 90,  # refills: 1   Last office visit: 11/13/2019 with prescribing provider:  jean-paul   Future Office Visit:     90 tablet 1     Sig: TAKE 1 TABLET BY MOUTH AT BEDTIME       Statins Protocol Passed - 3/4/2020  1:59 AM        Passed - LDL on file in past 12 months     Recent Labs   Lab Test 08/06/19  0909   LDL 55             Passed - No abnormal creatine kinase in past 12 months     No lab results found.             Passed - Recent (12 mo) or future (30 days) visit within the authorizing provider's specialty     Patient has had an office visit with the " "authorizing provider or a provider within the authorizing providers department within the previous 12 mos or has a future within next 30 days. See \"Patient Info\" tab in inbasket, or \"Choose Columns\" in Meds & Orders section of the refill encounter.              Passed - Medication is active on med list        Passed - Patient is age 18 or older        losartan (COZAAR) 100 MG tablet [Pharmacy Med Name: LOSARTAN POTASSIUM 100 MG TAB]  Last Written Prescription Date:  8/30/19  Last Fill Quantity: 90,  # refills: 1   Last office visit: 11/13/2019 with prescribing provider:  jean-paul   Future Office Visit:     90 tablet 1     Sig: TAKE 1 TABLET BY MOUTH EVERY DAY       Angiotensin-II Receptors Failed - 3/4/2020  1:59 AM        Failed - Last blood pressure under 140/90 in past 12 months     BP Readings from Last 3 Encounters:   11/13/19 (!) 158/90   08/09/19 134/84   06/04/18 120/82                 Failed - Normal serum potassium on file in past 12 months     Recent Labs   Lab Test 11/13/19  1413   POTASSIUM 3.1*                    Passed - Recent (12 mo) or future (30 days) visit within the authorizing provider's specialty     Patient has had an office visit with the authorizing provider or a provider within the authorizing providers department within the previous 12 mos or has a future within next 30 days. See \"Patient Info\" tab in inbasket, or \"Choose Columns\" in Meds & Orders section of the refill encounter.              Passed - Medication is active on med list        Passed - Patient is age 18 or older        Passed - Normal serum creatinine on file in past 12 months     Recent Labs   Lab Test 11/13/19  1413   CR 0.96               "

## 2020-04-08 ENCOUNTER — TELEPHONE (OUTPATIENT)
Dept: FAMILY MEDICINE | Facility: CLINIC | Age: 60
End: 2020-04-08

## 2020-04-08 ENCOUNTER — VIRTUAL VISIT (OUTPATIENT)
Dept: FAMILY MEDICINE | Facility: CLINIC | Age: 60
End: 2020-04-08
Payer: COMMERCIAL

## 2020-04-08 DIAGNOSIS — R35.1 NOCTURIA: ICD-10-CM

## 2020-04-08 DIAGNOSIS — E87.6 HYPOKALEMIA: ICD-10-CM

## 2020-04-08 DIAGNOSIS — E66.01 MORBID OBESITY (H): ICD-10-CM

## 2020-04-08 DIAGNOSIS — I10 HYPERTENSION, BENIGN ESSENTIAL, GOAL BELOW 140/90: Primary | ICD-10-CM

## 2020-04-08 PROCEDURE — 99214 OFFICE O/P EST MOD 30 MIN: CPT | Mod: TEL | Performed by: FAMILY MEDICINE

## 2020-04-08 RX ORDER — TAMSULOSIN HYDROCHLORIDE 0.4 MG/1
0.4 CAPSULE ORAL DAILY
Qty: 90 CAPSULE | Refills: 1 | Status: SHIPPED | OUTPATIENT
Start: 2020-04-08 | End: 2020-10-05

## 2020-04-08 NOTE — PROGRESS NOTES
"Subjective     Bi Bishop is a 59 year old male who is being evaluated via a billable telephone visit.      The patient has been notified of following:     \"This telephone visit will be conducted via a call between you and your physician/provider. We have found that certain health care needs can be provided without the need for a physical exam.  This service lets us provide the care you need with a short phone conversation.  If a prescription is necessary we can send it directly to your pharmacy.  If lab work is needed we can place an order for that and you can then stop by our lab to have the test done at a later time.    Telephone visits are billed at different rates depending on your insurance coverage. During this emergency period, for some insurers they may be billed the same as an in-person visit.  Please reach out to your insurance provider with any questions.    If during the course of the call the physician/provider feels a telephone visit is not appropriate, you will not be charged for this service.\"    Patient has given verbal consent for Telephone visit?  Yes    Bi Bishop complains of   Chief Complaint   Patient presents with     Telephone     DM and BP       ALLERGIES  Patient has no known allergies.    - He notes a 20lb weight lose since last clinic visit since starting a diet!    Diabetes Follow-up  Metformin 1,000mg qd  How often are you checking your blood sugar? One time daily  What time of day are you checking your blood sugars (select all that apply)?  Before meals  Have you had any blood sugars above 200?  No  Have you had any blood sugars below 70?  No    What symptoms do you notice when your blood sugar is low?  None    What concerns do you have today about your diabetes? None     Do you have any of these symptoms? (Select all that apply)  Burning in feet    Have you had a diabetic eye exam in the last 12 months? No        Hyperlipidemia Follow-Up  Simvastatin 20mg qd    Are you regularly " taking any medication or supplement to lower your cholesterol?   Yes- statin and fish oil    Are you having muscle aches or other side effects that you think could be caused by your cholesterol lowering medication?  No    Hypertension Follow-up  Amlodipine 10mg every day, losartan 100mg qd    Do you check your blood pressure regularly outside of the clinic? Yes     Are you following a low salt diet? Yes    Are your blood pressures ever more than 140 on the top number (systolic) OR more   than 90 on the bottom number (diastolic), for example 140/90? Yes    BP Readings from Last 2 Encounters:   11/13/19 (!) 158/90   08/09/19 134/84     Hemoglobin A1C (%)   Date Value   11/13/2019 6.6 (H)   08/06/2019 6.2 (H)     LDL Cholesterol Calculated (mg/dL)   Date Value   08/06/2019 55   05/24/2018 82     Restless legs at night.    - Urinary frequency at night, he is having to use the bathroom 5-6 times per night. No burning or pain. He denies any known family or self hx of prostate problems.       How many servings of fruits and vegetables do you eat daily?  4 or more    On average, how many sweetened beverages do you drink each day (Examples: soda, juice, sweet tea, etc.  Do NOT count diet or artificially sweetened beverages)?   0    How many days per week do you exercise enough to make your heart beat faster? He and wife are walking outside and riding bikes when the can    How many minutes a day do you exercise enough to make your heart beat faster? 20 - 29    How many days per week do you miss taking your medication? 0        Reviewed and updated as needed this visit by Provider         Review of Systems   ROS COMP: Constitutional, HEENT, cardiovascular, pulmonary, gi and gu systems are negative, except as otherwise noted.       Objective       Diagnostic Test Results:  Labs reviewed in Epic  none       Assessment     (I10) Hypertension, benign essential, goal below 140/90  (primary encounter diagnosis)  Comment: Elevated  blood pressure readings per home, on 2 drug therapy, ARB plus amlodipine.  May consider diuretic therapy however he does have decreased potassium level.  We will recheck, consider Spironolactone for blood pressure management.  Plan: **Basic metabolic panel FUTURE anytime        Await test results, no immediate changes in blood pressure medication at this time.    (R35.1) Nocturia  Comment: Patient states he is averaging being up at night 3 times, last night was 6.  His blood sugar readings have been averaging approximately 130, doubt uncontrolled diabetes.  Probable BPH.  Reviewed options, will treat with Flomax and see if there is a clinical response.  Plan: tamsulosin (FLOMAX) 0.4 MG capsule        Update in approximately 3 months.    (E11.65) Uncontrolled type 2 diabetes mellitus without complication, without long-term current use of insulin (H)  Comment: Improved, recent weight loss of 20 pounds.  Plan: **A1C FUTURE anytime        We will check A1c.    (E66.01) Morbid obesity (H)  Comment: Advised to continue with healthy lifestyle and weight reduction.      (E87.6) Hypokalemia  Comment: Etiology unclear, not on diuretic therapy.  Plan: Await test results.        Return in about 2 weeks (around 4/22/2020) for Lab Work.      Phone call duration:  12 minutes    Paty Badillo MD

## 2020-04-08 NOTE — TELEPHONE ENCOUNTER
Patient returning call to Dr. Badillo/Jaylene for virtual visit 4/8/20.    Thank you,    Jennifer Jones, Station

## 2020-04-08 NOTE — TELEPHONE ENCOUNTER
"Please call back. He just needs to set up a \"lab only\" appointment for blood tests. They are in the computer. Thank you.   "

## 2020-04-08 NOTE — TELEPHONE ENCOUNTER
Attempted to reach patient twice, he couldn't hear me. Will try again.    Jennifer Jones, Station

## 2020-05-07 ENCOUNTER — MYC MEDICAL ADVICE (OUTPATIENT)
Dept: FAMILY MEDICINE | Facility: CLINIC | Age: 60
End: 2020-05-07

## 2020-05-07 DIAGNOSIS — J45.40 MODERATE PERSISTENT ASTHMA WITHOUT COMPLICATION: Primary | ICD-10-CM

## 2020-05-08 RX ORDER — ALBUTEROL SULFATE 90 UG/1
2 AEROSOL, METERED RESPIRATORY (INHALATION) EVERY 6 HOURS PRN
Qty: 1 INHALER | Refills: 1 | Status: SHIPPED | OUTPATIENT
Start: 2020-05-08 | End: 2020-09-30

## 2020-05-08 NOTE — PROGRESS NOTES
Subjective:  HPI  Physical Exam                    Objective:  System    Physical Exam    General     ROS    Assessment/Plan:    DISCHARGE REPORT    Progress reporting period is from 10/4/2020 to 2/17/2020. 5 visits .     SUBJECTIVE        3 months post op R Biceps Tenodesis , extensive debridement. Patient is near pain free at rest. Gradually advancing function and day to day self care. Most limited from overhead reach and heavy lifting. Patient has been seen for  5 vists over the past 3 monts post op        OBJECTIVE    Flexion Assisted 150, AROM 130, scap hike noted , IR 75. Advanced to  next stage program with emphasis to scap strength, gentle posterior capsule ROM, gentle flexion ROM. Follow up in 2 weeks with MD, PT 3-4 weeks unless directed by MD nur.      ASSESSMENT/PLAN  Updated problem list and treatment plan: Diagnosis 1:  R Biceps tenodesis, SAD      Assessment of Progress: The patient's condition is improving.  The patient's condition has potential to improve.  Self Management Plans:  Patient has been instructed in a home treatment program.      Recommendations:  This patient would benefit from further evaluation.  Consider discontinue PT unless directed by MD nur     Please refer to the daily flowsheet for treatment today, total treatment time and time spent performing 1:1 timed codes.

## 2020-05-08 NOTE — TELEPHONE ENCOUNTER
Pt returned call, states he just had a virtual visit 4/8 with Aby. Pt requesting refill of albuterol. Please see previous patient note.    Thank you,    Jennifer Jones, Station [place]

## 2020-05-08 NOTE — TELEPHONE ENCOUNTER
Left message on answering machine to return call. Direct line provided.  Patient needs appointment to review medication refill for albuterol as it has been over 2 years since filled. Also need to review BP medication as BP isstill elevated.  Lona Amador RN

## 2020-08-09 DIAGNOSIS — E78.5 HYPERLIPIDEMIA LDL GOAL <100: Primary | ICD-10-CM

## 2020-08-09 DIAGNOSIS — R73.01 ELEVATED FASTING BLOOD SUGAR: ICD-10-CM

## 2020-08-09 DIAGNOSIS — E11.65 TYPE 2 DIABETES MELLITUS WITH HYPERGLYCEMIA (H): ICD-10-CM

## 2020-08-10 RX ORDER — METFORMIN HCL 500 MG
1000 TABLET, EXTENDED RELEASE 24 HR ORAL
Qty: 60 TABLET | Refills: 0 | Status: SHIPPED | OUTPATIENT
Start: 2020-08-10 | End: 2020-09-02

## 2020-08-10 NOTE — TELEPHONE ENCOUNTER
Medication is being filled for 1 time refill only due to:  Patient needs labs ,.  Pt had Virtual Visit 4/8/2020 with Dr. Badillo, and was instructed to: Return in about 2 weeks (around 4/22/2020) for Lab Work.  Pt has yet to complete this. He also is due for lipids & microalbumin per ; ordered.  [employer] message sent to pt.    Nahomi GALLOWAY RN, BSN

## 2020-08-17 ENCOUNTER — TELEPHONE (OUTPATIENT)
Dept: FAMILY MEDICINE | Facility: CLINIC | Age: 60
End: 2020-08-17

## 2020-08-17 NOTE — TELEPHONE ENCOUNTER
Spoke with patient  Reviewed need for fasting labs , he will call for appt   advsied once  Done a longer RX can be sent   Not due for FTF till Nov 2020 with Aby Mclean  RN/Boo Tavarez

## 2020-08-17 NOTE — TELEPHONE ENCOUNTER
Received a fax from [facility] Re: Metformin XR 500mg    Requesting a 90 day supply (#180)

## 2020-08-19 DIAGNOSIS — E11.65 TYPE 2 DIABETES MELLITUS WITH HYPERGLYCEMIA (H): ICD-10-CM

## 2020-08-19 DIAGNOSIS — I10 HYPERTENSION, BENIGN ESSENTIAL, GOAL BELOW 140/90: ICD-10-CM

## 2020-08-19 DIAGNOSIS — E78.5 HYPERLIPIDEMIA LDL GOAL <100: ICD-10-CM

## 2020-08-19 LAB
ANION GAP SERPL CALCULATED.3IONS-SCNC: 4 MMOL/L (ref 3–14)
BUN SERPL-MCNC: 16 MG/DL (ref 7–30)
CALCIUM SERPL-MCNC: 8.4 MG/DL (ref 8.5–10.1)
CHLORIDE SERPL-SCNC: 101 MMOL/L (ref 94–109)
CHOLEST SERPL-MCNC: 116 MG/DL
CO2 SERPL-SCNC: 32 MMOL/L (ref 20–32)
CREAT SERPL-MCNC: 0.97 MG/DL (ref 0.66–1.25)
GFR SERPL CREATININE-BSD FRML MDRD: 84 ML/MIN/{1.73_M2}
GLUCOSE SERPL-MCNC: 101 MG/DL (ref 70–99)
HBA1C MFR BLD: 6.2 % (ref 0–5.6)
HDLC SERPL-MCNC: 35 MG/DL
LDLC SERPL CALC-MCNC: 52 MG/DL
NONHDLC SERPL-MCNC: 81 MG/DL
POTASSIUM SERPL-SCNC: 3.3 MMOL/L (ref 3.4–5.3)
SODIUM SERPL-SCNC: 137 MMOL/L (ref 133–144)
TRIGL SERPL-MCNC: 146 MG/DL

## 2020-08-19 PROCEDURE — 83036 HEMOGLOBIN GLYCOSYLATED A1C: CPT | Performed by: FAMILY MEDICINE

## 2020-08-19 PROCEDURE — 80048 BASIC METABOLIC PNL TOTAL CA: CPT | Performed by: FAMILY MEDICINE

## 2020-08-19 PROCEDURE — 36415 COLL VENOUS BLD VENIPUNCTURE: CPT | Performed by: FAMILY MEDICINE

## 2020-08-19 PROCEDURE — 80061 LIPID PANEL: CPT | Performed by: FAMILY MEDICINE

## 2020-08-24 DIAGNOSIS — I10 HYPERTENSION, BENIGN ESSENTIAL, GOAL BELOW 140/90: ICD-10-CM

## 2020-08-24 DIAGNOSIS — E78.5 HYPERLIPIDEMIA LDL GOAL <100: ICD-10-CM

## 2020-08-25 NOTE — TELEPHONE ENCOUNTER
Routing refill request to provider for review/approval because:  Drug interaction warning  Labs out of range:    BP failed protocol  Leeanne Fernandes RN

## 2020-08-26 RX ORDER — LOSARTAN POTASSIUM 100 MG/1
TABLET ORAL
Qty: 90 TABLET | Refills: 1 | Status: SHIPPED | OUTPATIENT
Start: 2020-08-26 | End: 2021-02-25

## 2020-08-26 RX ORDER — SIMVASTATIN 20 MG
TABLET ORAL
Qty: 90 TABLET | Refills: 1 | Status: SHIPPED | OUTPATIENT
Start: 2020-08-26 | End: 2021-05-19

## 2020-08-26 RX ORDER — AMLODIPINE BESYLATE 10 MG/1
TABLET ORAL
Qty: 90 TABLET | Refills: 1 | Status: SHIPPED | OUTPATIENT
Start: 2020-08-26 | End: 2021-02-25

## 2020-09-02 ENCOUNTER — TELEPHONE (OUTPATIENT)
Dept: FAMILY MEDICINE | Facility: CLINIC | Age: 60
End: 2020-09-02

## 2020-09-02 DIAGNOSIS — E11.65 TYPE 2 DIABETES MELLITUS WITH HYPERGLYCEMIA (H): ICD-10-CM

## 2020-09-02 DIAGNOSIS — R73.01 ELEVATED FASTING BLOOD SUGAR: ICD-10-CM

## 2020-09-02 RX ORDER — METFORMIN HCL 500 MG
1000 TABLET, EXTENDED RELEASE 24 HR ORAL
Qty: 180 TABLET | Refills: 1 | Status: SHIPPED | OUTPATIENT
Start: 2020-09-02 | End: 2021-02-22

## 2020-09-02 NOTE — TELEPHONE ENCOUNTER
Pt had labs done and now needs 90 day script for metformin sent to [facility].    Thank you,    Jennifer Jones, Station [place]

## 2020-09-28 DIAGNOSIS — J45.40 MODERATE PERSISTENT ASTHMA WITHOUT COMPLICATION: ICD-10-CM

## 2020-09-30 RX ORDER — ALBUTEROL SULFATE 90 UG/1
AEROSOL, METERED RESPIRATORY (INHALATION)
Qty: 8.5 INHALER | Refills: 1 | Status: SHIPPED | OUTPATIENT
Start: 2020-09-30 | End: 2023-07-13

## 2020-09-30 NOTE — TELEPHONE ENCOUNTER
Pt called and states that he has been waiting since Monday to get his  Med approved, please advise.     Makenna Landrum, Station

## 2020-10-01 DIAGNOSIS — E87.6 HYPOKALEMIA: ICD-10-CM

## 2020-10-01 DIAGNOSIS — R35.1 NOCTURIA: ICD-10-CM

## 2020-10-02 NOTE — TELEPHONE ENCOUNTER
Routing refill request to provider for review/approval because:  Labs out of range:  BP and potassium    Evie Arias RN

## 2020-10-05 RX ORDER — TAMSULOSIN HYDROCHLORIDE 0.4 MG/1
0.4 CAPSULE ORAL DAILY
Qty: 90 CAPSULE | Refills: 1 | Status: SHIPPED | OUTPATIENT
Start: 2020-10-05 | End: 2020-11-11

## 2020-10-05 RX ORDER — POTASSIUM CHLORIDE 1500 MG/1
TABLET, EXTENDED RELEASE ORAL
Qty: 90 TABLET | Refills: 3 | Status: SHIPPED | OUTPATIENT
Start: 2020-10-05 | End: 2021-11-24

## 2020-11-11 ENCOUNTER — VIRTUAL VISIT (OUTPATIENT)
Dept: FAMILY MEDICINE | Facility: CLINIC | Age: 60
End: 2020-11-11
Payer: COMMERCIAL

## 2020-11-11 DIAGNOSIS — M17.12 PRIMARY OSTEOARTHRITIS OF LEFT KNEE: ICD-10-CM

## 2020-11-11 DIAGNOSIS — R35.1 NOCTURIA: ICD-10-CM

## 2020-11-11 DIAGNOSIS — E66.01 MORBID OBESITY (H): ICD-10-CM

## 2020-11-11 DIAGNOSIS — I10 HYPERTENSION, BENIGN ESSENTIAL, GOAL BELOW 140/90: Primary | ICD-10-CM

## 2020-11-11 DIAGNOSIS — K21.9 GASTROESOPHAGEAL REFLUX DISEASE, UNSPECIFIED WHETHER ESOPHAGITIS PRESENT: ICD-10-CM

## 2020-11-11 DIAGNOSIS — J45.40 MODERATE PERSISTENT ASTHMA WITHOUT COMPLICATION: ICD-10-CM

## 2020-11-11 PROCEDURE — 99214 OFFICE O/P EST MOD 30 MIN: CPT | Mod: TEL | Performed by: FAMILY MEDICINE

## 2020-11-11 RX ORDER — METOPROLOL SUCCINATE 50 MG/1
50 TABLET, EXTENDED RELEASE ORAL DAILY
Qty: 90 TABLET | Refills: 1 | Status: SHIPPED | OUTPATIENT
Start: 2020-11-11 | End: 2021-05-14

## 2020-11-11 NOTE — PROGRESS NOTES
"Bi Bishop is a 60 year old male who is being evaluated via a billable telephone visit.      The patient has been notified of following:     \"This telephone visit will be conducted via a call between you and your physician/provider. We have found that certain health care needs can be provided without the need for a physical exam.  This service lets us provide the care you need with a short phone conversation.  If a prescription is necessary we can send it directly to your pharmacy.  If lab work is needed we can place an order for that and you can then stop by our lab to have the test done at a later time.    Telephone visits are billed at different rates depending on your insurance coverage. During this emergency period, for some insurers they may be billed the same as an in-person visit.  Please reach out to your insurance provider with any questions.    If during the course of the call the physician/provider feels a telephone visit is not appropriate, you will not be charged for this service.\"    Patient has given verbal consent for Telephone visit?  Yes    What phone number would you like to be contacted at? [phone]    How would you like to obtain your AVS? Robina Hagan     Bi Bishop is a 60 year old male who presents via phone visit today for the following health issues:    HPI     Diabetes Follow-up    How often are you checking your blood sugar? One time daily  What time of day are you checking your blood sugars (select all that apply)?  Before meals  Have you had any blood sugars above 200?  No  Have you had any blood sugars below 70?  No    What symptoms do you notice when your blood sugar is low?  None    What concerns do you have today about your diabetes? None     Do you have any of these symptoms? (Select all that apply)  Sharp and shooting pains in toes, this has been stable since last visit    Have you had a diabetic eye exam in the last 12 months? No                Hyperlipidemia " Follow-Up      Are you regularly taking any medication or supplement to lower your cholesterol?   Yes- statin    Are you having muscle aches or other side effects that you think could be caused by your cholesterol lowering medication?  No    Hypertension Follow-up      Do you check your blood pressure regularly outside of the clinic? Yes     Are you following a low salt diet? Yes    Are your blood pressures ever more than 140 on the top number (systolic) OR more   than 90 on the bottom number (diastolic), for example 140/90? Yes    BP Readings from Last 2 Encounters:   11/13/19 (!) 158/90   08/09/19 134/84     Hemoglobin A1C (%)   Date Value   08/19/2020 6.2 (H)   11/13/2019 6.6 (H)     LDL Cholesterol Calculated (mg/dL)   Date Value   08/19/2020 52   08/06/2019 55       Asthma Follow-Up    Was ACT completed today?    Yes    ACT Total Scores 6/4/2018   ACT TOTAL SCORE -   ASTHMA ER VISITS -   ASTHMA HOSPITALIZATIONS -   ACT TOTAL SCORE (Goal Greater than or Equal to 20) 25   In the past 12 months, how many times did you visit the emergency room for your asthma without being admitted to the hospital? 0   In the past 12 months, how many times were you hospitalized overnight because of your asthma? 0          How many days per week do you miss taking your asthma controller medication?  0    Please describe any recent triggers for your asthma: laughing    Have you had any Emergency Room Visits, Urgent Care Visits, or Hospital Admissions since your last office visit?  No       Medication Followup of nocturia    Taking Medication as prescribed: yes    Side Effects:  None    Medication Helping Symptoms:  NO-He notes that he is continuing to have to use the bathroom about 4 times per night. He has no burning or pain        Review of Systems   CONSTITUTIONAL: NEGATIVE for fever, chills, change in weight  RESP: NEGATIVE for significant cough or SOB  CV: NEGATIVE for chest pain, palpitations or peripheral edema  : Notes  "nocturia x4, no improvement with Flomax.  MUSCULOSKELETAL: Ongoing lower back and left knee pain.       Objective       O:  gen: in NAD  RESP; no wheezing or cough heard.  PSY: alert, pleasant, mood and affect appropriate.       Vitals:  No vitals were obtained today due to virtual visit.        No results found for any visits on 11/11/20.        Assessment/Plan:    (I10) Hypertension, benign essential, goal below 140/90  (primary encounter diagnosis)  Comment: He reports home readings elevated, currently on 2 drug regimen, ARB plus amlodipine.  Given his history of nocturia and low potassium, will hold on diuretic therapy, try beta-blocker and see if his blood pressure improves.  Plan: metoprolol succinate ER (TOPROL-XL) 50 MG 24 hr        tablet        Continue on his other medications.  Update via [employer] with home blood pressure readings in 1 month.    (J45.40) Moderate persistent asthma without complication  Comment: Excellent response to controller medication, Advair.  Plan: Advised to use consistently.    (K21.9) Gastroesophageal reflux disease, unspecified whether esophagitis present  Comment: Symptoms controlled on PPI therapy.  Plan: For now, continue.    (E66.01) Morbid obesity (H)  Comment: Reviewed lifestyle.      (M17.12) Primary osteoarthritis of left knee  Comment: Ongoing, patient does use some Dilaudid for pain, okay with ongoing prescriptions.      (R35.1) Nocturia  Comment: No improvement with Flomax, reviewed options including finasteride.  At this time, patient will discontinue Flomax monitor symptoms.   PSA   Date Value Ref Range Status   12/21/2009 0.66 0 - 4 ug/L Final      Plan: Update as needed.         BMI:   Estimated body mass index is 40.07 kg/m  as calculated from the following:    Height as of 11/13/19: 1.924 m (6' 3.75\").    Weight as of 11/13/19: 148.3 kg (327 lb).          Patient Instructions   *   I'd like see your blood pressure readings under 140/90.    *   I will send another " blood pressure medication, metoprolol, to your pharmacy.  Please continue on the amlodipine and losartan.    *   Update me in about 1 month with your blood pressure readings.    *   If the Flomax is not helping, you may stop this medication.  I do have another medication called finasteride that can help with nighttime urination.    *   Your colonoscopy is due next year.    *   Hopefully, you will be able to see me at our [place] clinic next year.    *    The toe pain does not sound like diabetic neuropathy.    *     Blood test will be due in March, 2021.    *    Good luck with Devan.    *    Please call, or use [employer], with any questions or concerns.     Return in about 1 month (around 12/11/2020) for BP Recheck.    Paty Badillo MD  [facility]    Phone call duration:  18 minutes

## 2020-11-11 NOTE — PATIENT INSTRUCTIONS
*   I'd like see your blood pressure readings under 140/90.    *   I will send another blood pressure medication, metoprolol, to your pharmacy.  Please continue on the amlodipine and losartan.    *   Update me in about 1 month with your blood pressure readings.    *   If the Flomax is not helping, you may stop this medication.  I do have another medication called finasteride that can help with nighttime urination.    *   Your colonoscopy is due next year.    *   Hopefully, you will be able to see me at our [place] clinic next year.    *    The toe pain does not sound like diabetic neuropathy.    *     Blood test will be due in March, 2021.    *    Good luck with Devan.    *    Please call, or use [facility], with any questions or concerns.

## 2020-12-06 ENCOUNTER — HEALTH MAINTENANCE LETTER (OUTPATIENT)
Age: 60
End: 2020-12-06

## 2021-02-20 ENCOUNTER — HEALTH MAINTENANCE LETTER (OUTPATIENT)
Age: 61
End: 2021-02-20

## 2021-05-12 DIAGNOSIS — I10 HYPERTENSION, BENIGN ESSENTIAL, GOAL BELOW 140/90: ICD-10-CM

## 2021-05-14 DIAGNOSIS — E78.5 HYPERLIPIDEMIA LDL GOAL <100: ICD-10-CM

## 2021-05-14 RX ORDER — METOPROLOL SUCCINATE 50 MG/1
50 TABLET, EXTENDED RELEASE ORAL DAILY
Qty: 90 TABLET | Refills: 1 | Status: SHIPPED | OUTPATIENT
Start: 2021-05-14 | End: 2021-11-12

## 2021-05-14 NOTE — TELEPHONE ENCOUNTER
Routing refill request to provider for review/approval because:  BP not at goal  BP Readings from Last 3 Encounters:   11/13/19 (!) 158/90   08/09/19 134/84   06/04/18 120/82     Last seen by Dr Badillo virtual 11-11-20

## 2021-05-19 RX ORDER — SIMVASTATIN 20 MG
TABLET ORAL
Qty: 90 TABLET | Refills: 1 | Status: SHIPPED | OUTPATIENT
Start: 2021-05-19 | End: 2021-11-22

## 2021-05-19 NOTE — TELEPHONE ENCOUNTER
Routing refill request to provider for review/approval because:  Drug interaction warning          Pending Prescriptions:                       Disp   Refills    simvastatin (ZOCOR) 20 MG tablet [Pharmacy*90 tab*1        Sig: TAKE 1 TABLET BY MOUTH EVERYDAY AT BEDTIME        Yuan Lopez RN

## 2021-05-20 DIAGNOSIS — E11.9 CONTROLLED TYPE 2 DIABETES MELLITUS WITHOUT COMPLICATION, WITHOUT LONG-TERM CURRENT USE OF INSULIN (H): ICD-10-CM

## 2021-05-20 RX ORDER — METFORMIN HCL 500 MG
1000 TABLET, EXTENDED RELEASE 24 HR ORAL 2 TIMES DAILY WITH MEALS
Qty: 360 TABLET | Refills: 1 | Status: SHIPPED | OUTPATIENT
Start: 2021-05-20 | End: 2021-12-07

## 2021-05-26 ENCOUNTER — RECORDS - HEALTHEAST (OUTPATIENT)
Dept: ADMINISTRATIVE | Facility: CLINIC | Age: 61
End: 2021-05-26

## 2021-05-26 ENCOUNTER — TELEPHONE (OUTPATIENT)
Dept: FAMILY MEDICINE | Facility: CLINIC | Age: 61
End: 2021-05-26

## 2021-05-26 DIAGNOSIS — E11.9 CONTROLLED TYPE 2 DIABETES MELLITUS WITHOUT COMPLICATION, WITHOUT LONG-TERM CURRENT USE OF INSULIN (H): Primary | ICD-10-CM

## 2021-05-26 NOTE — TELEPHONE ENCOUNTER
NOT ON MED LIST  ONE TOUCH VERIO JHONY  ONE TOUCH 33G DELICAPL [facility] [place] -[phone]

## 2021-05-28 NOTE — TELEPHONE ENCOUNTER
Hard copy of Rx's for blood glucose (NO BRAND SPECIFIED) test strip, and blood glucose monitoring (NO BRAND SPECIFIED) meter device kit faxed to [facility]. Fax # [phone].

## 2021-08-19 DIAGNOSIS — I10 HYPERTENSION, BENIGN ESSENTIAL, GOAL BELOW 140/90: ICD-10-CM

## 2021-08-21 NOTE — TELEPHONE ENCOUNTER
Routing refill request to provider for review/approval because:  Labs out of range:    Potassium   Date Value Ref Range Status   08/19/2020 3.3 (L) 3.4 - 5.3 mmol/L Final     BP Readings from Last 3 Encounters:   11/13/19 (!) 158/90   08/09/19 134/84   06/04/18 120/82

## 2021-08-23 RX ORDER — AMLODIPINE BESYLATE 10 MG/1
TABLET ORAL
Qty: 90 TABLET | Refills: 1 | Status: SHIPPED | OUTPATIENT
Start: 2021-08-23 | End: 2022-02-18

## 2021-08-23 RX ORDER — LOSARTAN POTASSIUM 100 MG/1
TABLET ORAL
Qty: 90 TABLET | Refills: 1 | Status: SHIPPED | OUTPATIENT
Start: 2021-08-23 | End: 2022-02-18

## 2021-09-01 ENCOUNTER — TRANSFERRED RECORDS (OUTPATIENT)
Dept: MULTI SPECIALTY CLINIC | Facility: CLINIC | Age: 61
End: 2021-09-01

## 2021-09-01 LAB — RETINOPATHY: NORMAL

## 2021-09-10 ENCOUNTER — MYC MEDICAL ADVICE (OUTPATIENT)
Dept: FAMILY MEDICINE | Facility: CLINIC | Age: 61
End: 2021-09-10

## 2021-09-10 ENCOUNTER — OFFICE VISIT (OUTPATIENT)
Dept: FAMILY MEDICINE | Facility: CLINIC | Age: 61
End: 2021-09-10

## 2021-09-10 ENCOUNTER — ANCILLARY PROCEDURE (OUTPATIENT)
Dept: CT IMAGING | Facility: CLINIC | Age: 61
End: 2021-09-10
Attending: PHYSICIAN ASSISTANT

## 2021-09-10 VITALS
BODY MASS INDEX: 40.43 KG/M2 | HEART RATE: 82 BPM | WEIGHT: 315 LBS | OXYGEN SATURATION: 97 % | TEMPERATURE: 97.5 F | DIASTOLIC BLOOD PRESSURE: 89 MMHG | RESPIRATION RATE: 18 BRPM | SYSTOLIC BLOOD PRESSURE: 144 MMHG

## 2021-09-10 DIAGNOSIS — E66.01 MORBID OBESITY (H): Primary | ICD-10-CM

## 2021-09-10 DIAGNOSIS — R10.31 RLQ ABDOMINAL PAIN: ICD-10-CM

## 2021-09-10 DIAGNOSIS — K76.0 FATTY LIVER: ICD-10-CM

## 2021-09-10 DIAGNOSIS — E11.9 CONTROLLED TYPE 2 DIABETES MELLITUS WITHOUT COMPLICATION, WITHOUT LONG-TERM CURRENT USE OF INSULIN (H): ICD-10-CM

## 2021-09-10 DIAGNOSIS — R10.31 RLQ ABDOMINAL PAIN: Primary | ICD-10-CM

## 2021-09-10 DIAGNOSIS — N20.0 CALCULUS OF KIDNEY: Primary | ICD-10-CM

## 2021-09-10 DIAGNOSIS — I10 HYPERTENSION, BENIGN ESSENTIAL, GOAL BELOW 140/90: ICD-10-CM

## 2021-09-10 LAB
ALBUMIN UR-MCNC: NEGATIVE MG/DL
ANION GAP SERPL CALCULATED.3IONS-SCNC: 3 MMOL/L (ref 3–14)
APPEARANCE UR: CLEAR
BILIRUB UR QL STRIP: NEGATIVE
BUN SERPL-MCNC: 16 MG/DL (ref 7–30)
CALCIUM SERPL-MCNC: 8.6 MG/DL (ref 8.5–10.1)
CHLORIDE BLD-SCNC: 103 MMOL/L (ref 94–109)
CO2 SERPL-SCNC: 33 MMOL/L (ref 20–32)
COLOR UR AUTO: YELLOW
CREAT SERPL-MCNC: 1.01 MG/DL (ref 0.66–1.25)
ERYTHROCYTE [DISTWIDTH] IN BLOOD BY AUTOMATED COUNT: 13.3 % (ref 10–15)
GFR SERPL CREATININE-BSD FRML MDRD: 79 ML/MIN/1.73M2
GLUCOSE BLD-MCNC: 138 MG/DL (ref 70–99)
GLUCOSE UR STRIP-MCNC: NEGATIVE MG/DL
HCT VFR BLD AUTO: 42.3 % (ref 40–53)
HGB BLD-MCNC: 14.6 G/DL (ref 13.3–17.7)
HGB UR QL STRIP: NEGATIVE
KETONES UR STRIP-MCNC: NEGATIVE MG/DL
LEUKOCYTE ESTERASE UR QL STRIP: NEGATIVE
MCH RBC QN AUTO: 29.6 PG (ref 26.5–33)
MCHC RBC AUTO-ENTMCNC: 34.5 G/DL (ref 31.5–36.5)
MCV RBC AUTO: 86 FL (ref 78–100)
NITRATE UR QL: NEGATIVE
PH UR STRIP: 7.5 [PH] (ref 5–7)
PLATELET # BLD AUTO: 249 10E3/UL (ref 150–450)
POTASSIUM BLD-SCNC: 3.3 MMOL/L (ref 3.4–5.3)
RBC # BLD AUTO: 4.93 10E6/UL (ref 4.4–5.9)
SODIUM SERPL-SCNC: 139 MMOL/L (ref 133–144)
SP GR UR STRIP: 1.02 (ref 1–1.03)
UROBILINOGEN UR STRIP-ACNC: 0.2 E.U./DL
WBC # BLD AUTO: 6.2 10E3/UL (ref 4–11)

## 2021-09-10 PROCEDURE — 85027 COMPLETE CBC AUTOMATED: CPT | Performed by: PHYSICIAN ASSISTANT

## 2021-09-10 PROCEDURE — 80048 BASIC METABOLIC PNL TOTAL CA: CPT | Performed by: PHYSICIAN ASSISTANT

## 2021-09-10 PROCEDURE — 99214 OFFICE O/P EST MOD 30 MIN: CPT | Performed by: PHYSICIAN ASSISTANT

## 2021-09-10 PROCEDURE — 36415 COLL VENOUS BLD VENIPUNCTURE: CPT | Performed by: PHYSICIAN ASSISTANT

## 2021-09-10 PROCEDURE — 81003 URINALYSIS AUTO W/O SCOPE: CPT | Performed by: PHYSICIAN ASSISTANT

## 2021-09-10 PROCEDURE — 74177 CT ABD & PELVIS W/CONTRAST: CPT | Mod: TC | Performed by: RADIOLOGY

## 2021-09-10 RX ORDER — IOPAMIDOL 755 MG/ML
500 INJECTION, SOLUTION INTRAVASCULAR ONCE
Status: COMPLETED | OUTPATIENT
Start: 2021-09-10 | End: 2021-09-10

## 2021-09-10 RX ADMIN — Medication 60 ML: at 11:39

## 2021-09-10 RX ADMIN — IOPAMIDOL 100 ML: 755 INJECTION, SOLUTION INTRAVASCULAR at 11:39

## 2021-09-10 ASSESSMENT — ENCOUNTER SYMPTOMS
SHORTNESS OF BREATH: 0
DIARRHEA: 0
FEVER: 0
HEMATURIA: 0
ABDOMINAL PAIN: 1
CONSTIPATION: 0
HEMATOCHEZIA: 0
FLANK PAIN: 0
DIAPHORESIS: 0
NAUSEA: 0
DYSURIA: 0
LIGHT-HEADEDNESS: 0
VOMITING: 0
CHILLS: 0
NERVOUS/ANXIOUS: 0

## 2021-09-10 NOTE — PROGRESS NOTES
Assessment & Plan     RLQ abdominal pain  Patient is a 60-year-old male presents to clinic due to 2 months of intermittent right lower quadrant pain that is worsening.  No history of abdominal surgeries or diverticulitis.  Vital signs normal.  Physical exam significant for lower abdominal tenderness palpation, worse at right lower quadrant.  Differential diagnosis includes diverticulitis, appendicitis, constipation, hernia.  Will complete labs as well as CT for further evaluation.  Symptoms that warrant urgent/emergent follow-up reviewed.    - CBC with platelets; Future  - Basic metabolic panel  (Ca, Cl, CO2, Creat, Gluc, K, Na, BUN); Future  - UA Macro with Reflex to Micro and Culture - lab collect; Future  - CT Abdomen Pelvis w Contrast; Future  - UA Macro with Reflex to Micro and Culture - lab collect  - Basic metabolic panel  (Ca, Cl, CO2, Creat, Gluc, K, Na, BUN)  - CBC with platelets    See Patient Instructions    Return in about 1 week (around 9/17/2021), or if symptoms worsen or fail to improve.    JOHAN Adhikari [facility] MARÍA Bhat is a 60 year old who presents for the following health issues     HPI     Abdominal/Flank Pain  Onset/Duration: 2 months of intermittent RLQ pain that is worsening.  Description:   Character: Sharp pain with stretching or when having BM  Location: right lower quadrant  Radiation: None  Intensity: severe  Progression of Symptoms:  intermittent  Accompanying Signs & Symptoms:  Fever/Chills: no  Gas/Bloating: no  Nausea: no  Vomitting: no  Diarrhea: no  Constipation: no  Dysuria or Hematuria: no  History:   Trauma: no  Previous similar pain: no  Previous tests done: none  Precipitating factors:   Does the pain change with:     Food: no    Bowel Movement: YES    Urination: no   Other factors:  no  Therapies tried and outcome: None      Review of Systems   Constitutional: Negative for chills, diaphoresis and fever.   Respiratory: Negative for  shortness of breath.    Cardiovascular: Negative for chest pain.   Gastrointestinal: Positive for abdominal pain (RLQ). Negative for constipation, diarrhea, hematochezia, nausea and vomiting.   Genitourinary: Negative for dysuria, flank pain, hematuria and urgency.   Skin: Negative for rash.   Neurological: Negative for light-headedness.   Psychiatric/Behavioral: The patient is not nervous/anxious.           Objective    BP (!) 144/89   Pulse 82   Temp 97.5  F (36.4  C) (Tympanic)   Resp 18   Wt 149.7 kg (330 lb)   SpO2 97%   BMI 40.43 kg/m    Body mass index is 40.43 kg/m .  Physical Exam  Vitals and nursing note reviewed.   Constitutional:       General: He is not in acute distress.     Appearance: Normal appearance.   HENT:      Head: Normocephalic and atraumatic.      Mouth/Throat:      Mouth: Mucous membranes are moist.      Pharynx: Oropharynx is clear.   Eyes:      Extraocular Movements: Extraocular movements intact.      Pupils: Pupils are equal, round, and reactive to light.   Cardiovascular:      Rate and Rhythm: Normal rate and regular rhythm.      Heart sounds: Normal heart sounds.   Pulmonary:      Effort: Pulmonary effort is normal.      Breath sounds: Normal breath sounds.   Abdominal:      General: Bowel sounds are normal.      Palpations: Abdomen is soft.      Tenderness: There is abdominal tenderness (RLQ>LLQ). There is no right CVA tenderness, left CVA tenderness, guarding or rebound.   Musculoskeletal:         General: Normal range of motion.      Cervical back: Normal range of motion.   Skin:     General: Skin is warm and dry.   Neurological:      General: No focal deficit present.      Mental Status: He is alert.   Psychiatric:         Mood and Affect: Mood normal.         Behavior: Behavior normal.

## 2021-09-10 NOTE — PATIENT INSTRUCTIONS
For further evaluation we are completing a CT and lab work. We will contact you with results and next steps once available.     If you develop severe pain, fevers, or any other severe symptoms, please go directly to the emergency department.    Reach out with any questions or concerns.      Patient Education     Abdominal Pain  Abdominal pain is pain in the stomach or belly area. Everyone has this pain from time to time. In many cases it goes away on its own. But abdominal pain can sometimes be due to a serious problem, such as appendicitis. So it s important to know when to get help.    Causes of abdominal pain  There are many possible causes of abdominal pain. Common causes in adults include:    Constipation, diarrhea, or gas    Stomach acid flowing back up into the esophagus (acid reflux or heartburn)    Severe acid reflux, called GERD (gastroesophageal reflux disease)    A sore in the lining of the stomach or small intestine (peptic ulcer)    Inflammation of the gallbladder, liver, or pancreas    Gallstones or kidney stones    Appendicitis     Intestinal blockage     An internal organ pushing through a muscle or other tissue (hernia)    Urinary tract infections    In women, menstrual cramps, fibroids, ovarian cysts, pelvic inflammatory disease, or endometriosis    Inflammation or infection of the intestines, including Crohn's disease and ulcerative colitis    Irritable bowel syndrome  Diagnosing the cause of abdominal pain  Your healthcare provider will give you a physical exam help find the cause of your pain. If needed, you will have tests. Belly pain has many possible causes. So it can be hard to find the reason for your pain. Giving details about your pain can help. Tell your provider where and when you feel the pain, and what makes it better or worse. Also let your provider know if you have other symptoms such as:    Fever    Tiredness    Upset stomach (nausea)    Vomiting    Changes in bathroom  habits    Blood in the stool or black, tarry stool    Weight loss that you can't explain (involuntary weight loss?)  Also report any family history of stomach or intestinal problems, or cancers. Tell your provider about all your alcohol use and drug use. Tell your provider about all medicines you use, including herbs, vitamins, and supplements.  Treating abdominal pain  Some causes of pain need emergency medical treatment right away. These include appendicitis or a bowel blockage. Other problems can be treated with rest, fluids, or medicines. Your healthcare provider can give you specific instructions for treatment or self-care based on what is causing your pain.     If you have vomiting or diarrhea, sip water or other clear fluids. When you are ready to eat solid foods again, start with small amounts of easy-to-digest, low-fat foods. These include apple sauce, toast, or crackers.  When to get medical care  Call 911 or go to the hospital right away if you:    Can t pass stool and are vomiting    Are vomiting blood or have bloody diarrhea or black, tarry diarrhea    Have chest, neck, or shoulder pain    Feel like you might pass out    Have pain in your shoulder blades with nausea    Have sudden, severe belly pain    Have new, severe pain unlike any you have felt before    Have a belly that is rigid, hard, and hurts to touch  Call your healthcare provider if you have:    Pain for more than 5 days    Bloating for more than 2 days    Diarrhea for more than 5 days    A fever of 100.4 F (38 C) or higher, or as directed by your healthcare provider    Pain that gets worse    Weight loss for no reason    Continued lack of appetite    Blood in your stool  How to prevent abdominal pain  Here are some tips to help prevent abdominal pain:    Eat smaller amounts of food at each meal.    Don't eat greasy, fried, or other high-fat foods.    Don't eat foods that give you gas.    Exercise regularly.    Drink plenty of fluids.  To  help prevent GERD symptoms:    Quit smoking.    Reduce alcohol and foods that increase stomach acid.    Don't use aspirin or over-the-counter pain and fever medicines, if possible. This includes nonsteroidal anti-inflammatory drugs (NSAIDs).    Lose excess weight.    Finish eating at least 2 hours before you go to bed or lie down.    Raise the head of your bed.  [employer] last reviewed this educational content on 4/1/2019 2000-2021 [employer]. All rights reserved. This information is not intended as a substitute for professional medical care. Always follow your healthcare professional's instructions.

## 2021-09-15 ENCOUNTER — TELEPHONE (OUTPATIENT)
Dept: FAMILY MEDICINE | Facility: CLINIC | Age: 61
End: 2021-09-15

## 2021-09-15 DIAGNOSIS — Z12.11 SCREEN FOR COLON CANCER: Primary | ICD-10-CM

## 2021-09-25 ENCOUNTER — HEALTH MAINTENANCE LETTER (OUTPATIENT)
Age: 61
End: 2021-09-25

## 2021-09-29 DIAGNOSIS — J45.40 MODERATE PERSISTENT ASTHMA WITHOUT COMPLICATION: ICD-10-CM

## 2021-10-01 DIAGNOSIS — J45.40 MODERATE PERSISTENT ASTHMA WITHOUT COMPLICATION: ICD-10-CM

## 2021-10-02 NOTE — TELEPHONE ENCOUNTER
Medication is being filled for 1 time refill only due to:  Patient needs to be seen because need annual exam.   Sent my chart to schedule appt.  Patricia Cifuentes RN

## 2021-10-11 ENCOUNTER — HOSPITAL ENCOUNTER (OUTPATIENT)
Facility: CLINIC | Age: 61
End: 2021-10-11
Attending: SURGERY | Admitting: SURGERY
Payer: COMMERCIAL

## 2021-10-17 DIAGNOSIS — Z11.59 ENCOUNTER FOR SCREENING FOR OTHER VIRAL DISEASES: ICD-10-CM

## 2021-10-24 DIAGNOSIS — E11.9 CONTROLLED TYPE 2 DIABETES MELLITUS WITHOUT COMPLICATION, WITHOUT LONG-TERM CURRENT USE OF INSULIN (H): ICD-10-CM

## 2021-10-26 ENCOUNTER — OFFICE VISIT (OUTPATIENT)
Dept: UROLOGY | Facility: CLINIC | Age: 61
End: 2021-10-26

## 2021-10-26 ENCOUNTER — PREP FOR PROCEDURE (OUTPATIENT)
Dept: UROLOGY | Facility: CLINIC | Age: 61
End: 2021-10-26

## 2021-10-26 ENCOUNTER — ANCILLARY PROCEDURE (OUTPATIENT)
Dept: GENERAL RADIOLOGY | Facility: CLINIC | Age: 61
End: 2021-10-26
Attending: UROLOGY

## 2021-10-26 ENCOUNTER — TELEPHONE (OUTPATIENT)
Dept: UROLOGY | Facility: CLINIC | Age: 61
End: 2021-10-26

## 2021-10-26 VITALS
DIASTOLIC BLOOD PRESSURE: 95 MMHG | BODY MASS INDEX: 40.43 KG/M2 | SYSTOLIC BLOOD PRESSURE: 148 MMHG | HEART RATE: 76 BPM | WEIGHT: 315 LBS

## 2021-10-26 DIAGNOSIS — I10 HYPERTENSION, BENIGN ESSENTIAL, GOAL BELOW 140/90: ICD-10-CM

## 2021-10-26 DIAGNOSIS — N40.1 BENIGN PROSTATIC HYPERPLASIA WITH LOWER URINARY TRACT SYMPTOMS, SYMPTOM DETAILS UNSPECIFIED: ICD-10-CM

## 2021-10-26 DIAGNOSIS — R10.11 RUQ ABDOMINAL PAIN: Primary | ICD-10-CM

## 2021-10-26 DIAGNOSIS — N20.0 KIDNEY STONE: Primary | ICD-10-CM

## 2021-10-26 DIAGNOSIS — N20.0 CALCULUS OF KIDNEY: ICD-10-CM

## 2021-10-26 LAB — PSA SERPL-MCNC: 1.13 UG/L (ref 0–4)

## 2021-10-26 PROCEDURE — 84153 ASSAY OF PSA TOTAL: CPT | Performed by: UROLOGY

## 2021-10-26 PROCEDURE — 99204 OFFICE O/P NEW MOD 45 MIN: CPT | Mod: 25 | Performed by: UROLOGY

## 2021-10-26 PROCEDURE — 36415 COLL VENOUS BLD VENIPUNCTURE: CPT | Performed by: UROLOGY

## 2021-10-26 PROCEDURE — 51798 US URINE CAPACITY MEASURE: CPT | Performed by: UROLOGY

## 2021-10-26 PROCEDURE — 74019 RADEX ABDOMEN 2 VIEWS: CPT | Performed by: RADIOLOGY

## 2021-10-26 RX ORDER — TAMSULOSIN HYDROCHLORIDE 0.4 MG/1
0.4 CAPSULE ORAL AT BEDTIME
Qty: 30 CAPSULE | Refills: 1 | Status: SHIPPED | OUTPATIENT
Start: 2021-10-26 | End: 2021-11-22

## 2021-10-26 NOTE — PROGRESS NOTES
S: Patient is a pleasant 61-year-old male who was requested to be seen by Dr. Rodrigues for consultation with regard to patient's right upper abdominal pain with recent CT scan showing bilateral renal stones with right renal pelvis stone measuring 1.4 cm and 1 cm lower pole left renal stone.  His abdominal pain is intermittent.  It comes and goes.  It is worse after activities.  He denies any fever nausea vomiting or gross hematuria.  He also complains of urinary problems which has been going for a number of years.  His AUA symptom score today is 22 with a quality-of-life score of 3.  He denies any dysuria or hematuria.  He complains of nocturia x4 with obstructive voiding symptoms mainly.  He has frequency and urgency also.  Current Outpatient Medications   Medication Sig Dispense Refill     albuterol (PROAIR HFA/PROVENTIL HFA/VENTOLIN HFA) 108 (90 Base) MCG/ACT inhaler INHALE 2 PUFFS INTO THE LUNGS EVERY 6 HOURS AS NEEDED FOR SHORTNESS OF BREATH/DYSPNEA OR WHEEZING 8.5 Inhaler 1     albuterol (PROVENTIL HFA) 108 (90 BASE) MCG/ACT Inhaler Inhale 2 puffs into the lungs every 6 hours as needed for shortness of breath / dyspnea 3 Inhaler 3     amLODIPine (NORVASC) 10 MG tablet TAKE 1 TABLET BY MOUTH EVERY DAY 90 tablet 1     Ascorbic Acid (VITAMIN C CR PO) Take 1,000 mg by mouth daily.       aspirin 81 MG chewable tablet Take 81 mg by mouth daily.       blood glucose (NO BRAND SPECIFIED) test strip Use to test blood sugar 1-3 times daily or as directed. 300 strip 1     blood glucose monitoring (NO BRAND SPECIFIED) meter device kit Use to test blood sugar 1-3 times daily or as directed. 1 kit 0     cetirizine (ZYRTEC) 10 MG tablet Take 10 mg by mouth daily.       Cholecalciferol (VITAMIN D) 1000 UNIT capsule Take 1 capsule by mouth daily.       fluticasone-salmeterol (ADVAIR DISKUS) 250-50 MCG/DOSE inhaler +++NEED ANNUAL EXAM+++INHALE 1 PUFF INTO THE LUNGS EVERY 12 HOURS 180 each 0     KLOR-CON 20 MEQ CR tablet TAKE 1  TABLET BY MOUTH DAILY 90 tablet 3     losartan (COZAAR) 100 MG tablet TAKE 1 TABLET BY MOUTH EVERY DAY 90 tablet 1     metFORMIN (GLUCOPHAGE-XR) 500 MG 24 hr tablet Take 2 tablets (1,000 mg) by mouth 2 times daily (with meals) 360 tablet 1     metoprolol succinate ER (TOPROL-XL) 50 MG 24 hr tablet TAKE 1 TABLET (50 MG) BY MOUTH DAILY FOR BLOOD PRESSURE. 90 tablet 1     MULTI-VITAMIN OR Once daily       Omega-3 Fatty Acids (FISH OIL PO) Take  by mouth 2 times daily.       omeprazole (PRILOSEC) 20 MG capsule Take 20 mg by mouth daily.       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     Hypertension goal BP (blood pressure) < 130/80 4/21/2011     Obesity, unspecified 1/15/08     Uncontrolled type 2 diabetes mellitus without complication, without long-term current use of insulin 1/7/2017     Past Surgical History:   Procedure Laterality Date     COLONOSCOPY  5/26/2011    Procedure:COMBINED COLONOSCOPY, REMOVE TUMOR/POLYP/LESION BY SNARE; Surgeon:BRIANNA TAPIA; Location:[facility]      Family History   Problem Relation Age of Onset     Cancer Mother      Obesity Father      Diabetes Father      Diabetes Brother      Obesity Brother      Diabetes Brother      Obesity Brother      Social History     Socioeconomic History     Marital status:      Spouse name: None     Number of children: None     Years of education: None     Highest education level: None   Occupational History     None   Tobacco Use     Smoking status: Never Smoker     Smokeless tobacco: Never Used   Substance and Sexual Activity     Alcohol use: Yes     Comment: rare     Drug use: No     Sexual  activity: Yes     Partners: Female   Other Topics Concern     Parent/sibling w/ CABG, MI or angioplasty before 65F 55M? Not Asked   Social History Narrative     None     Social Determinants of Health     Financial Resource Strain:      Difficulty of Paying Living Expenses:    Food Insecurity:      Worried About Running Out of Food in the Last Year:      Ran Out of Food in the Last Year:    Transportation Needs:      Lack of Transportation (Medical):      Lack of Transportation (Non-Medical):    Physical Activity:      Days of Exercise per Week:      Minutes of Exercise per Session:    Stress:      Feeling of Stress :    Social Connections:      Frequency of Communication with Friends and Family:      Frequency of Social Gatherings with Friends and Family:      Attends Faith Services:      Active Member of Clubs or Organizations:      Attends Club or Organization Meetings:      Marital Status:    Intimate Partner Violence:      Fear of Current or Ex-Partner:      Emotionally Abused:      Physically Abused:      Sexually Abused:        REVIEW OF SYSTEMS  =================  C: NEGATIVE for fever, chills, change in weight  I: NEGATIVE for worrisome rashes, moles or lesions  E/M: NEGATIVE for ear, mouth and throat problems  R: NEGATIVE for significant cough or SHORTNESS OF BREATH  CV:  NEGATIVE for chest pain, palpitations or peripheral edema  GI: NEGATIVE for nausea, abdominal pain, heartburn, or change in bowel habits  NEURO: NEGATIVE numbness/weakness  : see HPI  PSYCH: NEGATIVE depression/anxiety  LYmph: no new enlarged lymph nodes  Ortho: no new trauma/movements      Physical Exam:  BP (!) 148/95   Pulse 76   Wt 149.7 kg (330 lb)   BMI 40.43 kg/m     Patient is pleasant, in no acute distress, good general condition.  Heart:  negative, PMI normal  Lung: no evidence of respiratory distress    Abdomen: Soft, nondistended, non tender. No masses. No rebound or guarding.   Exam: Penis no discharge.  Testes  without any masses.  No scrotal skin lesion.  Prostate apex only.  Bladder scan with residual urine less than 50 mL.  Skin: Warm and dry.  No redness.  Neuro: grossly normal  Musculaskeletal: moving all extremities  Psych normal mood and affect  Musculoskeletal  moving all extremities  Hematologic/Lymphatic/Immunologic: normal ant/post cervical, axillary, supraclavicular and inguinal nodes    Assessment/Plan:   #1 right upper abdominal pain with recent CT scan showing bilateral renal stones.  There is a chance that his abdominal pain could be due to intermittent obstruction of his right ureter pelvis kidney stone due to its location.  However it is not certain.  Treatment options for his renal stones discussed.  We discussed observation versus ESWL versus ureteroscopy.  Pros and cons discussed.  We'll schedule patient for elective ESWL with possible stent placement.  Success rate of ESWL about 80% discussed.  Stent discomfort discussed.    #2 BPH: Will start patient on tamsulosin.  Side effect discussed.  Will check PSA today.    #3 hypertension follow-up with primary care MD.

## 2021-10-26 NOTE — PATIENT INSTRUCTIONS
Surgery Preparation    You will receive a phone call from the [facility] within 1-2 days. If you do not receive a call within 2 days, contact [phone] to schedule the procedure. You can write the location/date/time of surgery in the space provided below for your records.    Location of Surgery:                                          Date of Surgery:                                                 Time of Arrival:                                                   Time of Surgery:                                                   Please arrange an appointment with a primary care provider for a pre-op physical. This is a mandatory appointment that needs to be completed within the two weeks prior to your surgery date. This gives clearance for you to receive anesthesia during your procedure. If you have had a pre-op physical within 30 days of your surgery date, you may not need another one. Check with your provider.    If you are having a Same Day Surgery, you must have a  to and from the procedure. Someone needs to stay with you post-operatively for 12 hours.     Do not eat or drink any solids, milk products, or pulpy juices after midnight the night before your surgery. You may have clear liquids up to 8 hours prior to the surgery. This would include water, soda, coffee (without cream), tea, broth, and jello.    Please stop all over the counter blood thinners such as Aspirin, Advil, Aleve, Ibuprofen, Motrin, and Excedrin one week prior to surgery. Please discontinue prescription blood thinners 5-7 days prior to surgery, per your primary physician's orders.     Please check with your insurance company to confirm that your procedure is covered, and that the facility is in-network.     Call the Urology Department @ [phone] if you have questions about your surgery, or if you need to reschedule your procedure.     Patient Education     Shock Wave Lithotripsy  Passing a kidney stone can be  very painful. Shock wave lithotripsy is a treatment that helps by breaking the kidney stone into smaller pieces that are easier to pass. This treatment is also called extracorporeal shock wave lithotripsy (ESWL). Lithotripsy takes about an hour. It s done in a hospital, lithotripsy center, or mobile lithotripsy van. You will likely go home the same day. This treatment is not used for all types of kidney stones. Your healthcare provider will discuss whether this is the right treatment for the type of stone you have.       Energy waves strike the stone, which begins to crack. The stone crumbles into tiny pieces.   During the procedure    You get medicine to prevent pain and help you relax or sleep during lithotripsy. Once this takes effect, the procedure will start.    A flexible tube (stent) with holes in it may be placed into your ureter, the tube that connects the kidney and the bladder. This helps keep urine flowing from the kidney.    Your healthcare provider then uses X-ray or ultrasound to find the exact location of the kidney stone.    Sound waves are aimed at the stone and sent at high speed. If you re awake, you may feel a tapping as they pass through your body.    After the procedure    You ll be closely watched in a recovery room for about 1 to 3 hours. Antibiotics and pain medicine may be prescribed before you leave.    You ll have a follow-up visit in a few weeks. If you received a stent, it will be removed. Your healthcare provider will also check for pieces of stone. If large pieces remain, you may need a second lithotripsy or another procedure.    Possible risks and complications    Infection    Bleeding in the kidney    Bruising of the kidney or skin    Blockage (obstruction) of the ureter    Failure to break up the stone (other procedures may be needed)    Passing the stone  It can take a day to several weeks for the pieces of stone to leave your body. Drink plenty of liquids --at least 8 to12-ounce  glasses of water a day-- to help flush your system. During this time:     Your urine may be cloudy or slightly bloody. You may even see small pieces of stone.    You may have a slight fever and some pain. Take prescribed or over-the-counter pain medicine as instructed by your healthcare provider.    You may be asked to strain your urine to collect some stone particles. These will be studied in the lab.  When to call your healthcare provider  Call your healthcare provider right away if you have any of the following:    Fever of 100.4 F (38 C) or higher, or as directed by your healthcare provider    Heavy bleeding    Pain that doesn t go away with medicine    Upset stomach and vomiting    Problems urinating    New symptoms or symptoms that get worse  [employer] last reviewed this educational content on 4/1/2020 2000-2021 [employer]. All rights reserved. This information is not intended as a substitute for professional medical care. Always follow your healthcare professional's instructions.           Patient Education     Having a Ureteral Stent  A ureteral stent is a soft plastic tube with holes in it. It s temporarily inserted into a ureter to help drain urine into the bladder. One end goes in the kidney. The other end goes in the bladder. A coil on each end holds the stent in place. The stent can t be seen from outside the body. It shouldn t interfere with your normal routine. Your stent will be put in by a doctor trained in treating the urinary tract (a urologist) or another specialist. The procedure is done in a hospital or surgery center. You ll likely go home the same day.   When is a ureteral stent used?  A ureteral stent may be used:    To bypass a blockage in a kidney or ureter.    During kidney stone removal.    To let a ureter heal after surgery.    Before the procedure  Your healthcare provider will give you instructions to prepare for the procedure. X-rays or other imaging tests of your  kidneys and ureters may be done beforehand.   During the procedure    You receive medicine to prevent pain and help you relax or sleep during the procedure. Once this takes effect, the procedure starts.    The doctor inserts a cystoscope (lighted instrument) through the urethra and into the bladder. This shows the opening to the ureter.    A thin wire is carefully threaded through the cystoscope, up the ureter, and into the kidney. The stent is inserted over the wire.    A fluoroscope (special X-ray machine) is used to help position the stent. When the stent is in place, the wire and cystoscope are removed.    While you have a stent    Some discomfort is normal. Certain movements may trigger pain or a feeling that you need to urinate. You may also feel mild soreness or pressure before or during urination. These symptoms will go away a few days after the stent is removed.    Medicine to control pain or bladder spasms or to prevent infection may be prescribed. Take this as directed.    Drink plenty of fluids to help flush out your urinary tract.    Your urine may be slightly pink or red. This is due to bleeding caused by minor irritation from the stent. This may happen on and off while you have the stent.    As with any synthetic device placed in the body, there is a risk of infection. The stent may have to be removed if this happens.     How long will you need a stent?  The stent is often taken out after the blockage in the ureter is treated or the ureter has healed. This may take 1 week to 2 weeks, or longer. If a stent is needed for a long time, it may need to be changed every few months.   When to call your healthcare provider  Contact your healthcare provider right away if:    Your urine contains blood clots or you see a large amount of blood-tinged urine    You have symptoms similar to those you had before the stent was placed    You constantly leak urine    You have a fever over 100.4 F (38 C), or as advised by  your health care provider    You have chills    You experience nausea or vomiting    Your pain is not relieved with medicine    The end of the stent comes out of the urethra    You experience new or worsening symptoms  Richa last reviewed this educational content on 4/1/2020 2000-2021 [employer]. All rights reserved. This information is not intended as a substitute for professional medical care. Always follow your healthcare professional's instructions.

## 2021-10-27 ENCOUNTER — OFFICE VISIT (OUTPATIENT)
Dept: DERMATOLOGY | Facility: CLINIC | Age: 61
End: 2021-10-27

## 2021-10-27 VITALS — SYSTOLIC BLOOD PRESSURE: 152 MMHG | DIASTOLIC BLOOD PRESSURE: 93 MMHG

## 2021-10-27 DIAGNOSIS — L91.8 SKIN TAG: ICD-10-CM

## 2021-10-27 DIAGNOSIS — D23.9 DERMAL NEVUS: ICD-10-CM

## 2021-10-27 DIAGNOSIS — L82.1 SEBORRHEIC KERATOSIS: ICD-10-CM

## 2021-10-27 DIAGNOSIS — D18.01 ANGIOMA OF SKIN: ICD-10-CM

## 2021-10-27 DIAGNOSIS — L72.0 EPIDERMAL CYST: ICD-10-CM

## 2021-10-27 DIAGNOSIS — L81.4 LENTIGO: Primary | ICD-10-CM

## 2021-10-27 PROCEDURE — 99203 OFFICE O/P NEW LOW 30 MIN: CPT | Performed by: DERMATOLOGY

## 2021-10-27 NOTE — PROGRESS NOTES
Bi Bishop is an extremely pleasant 61 year old year old male patient here today for spot on nose, back, groin and arms.   .   Patient states this has been present for a while.  Patient reports the following symptoms:  none.  Patient reports the following previous treatments none.  These treatments did not work.  Patient reports the following modifying factors none.  Associated symptoms: none.  Patient has no other skin complaints today.  Remainder of the HPI, Meds, PMH, Allergies, FH, and SH was reviewed in chart.      Past Medical History:   Diagnosis Date     Allergic urticaria 1/15/08     Hypertension goal BP (blood pressure) < 130/80 4/21/2011     Obesity, unspecified 1/15/08     Uncontrolled type 2 diabetes mellitus without complication, without long-term current use of insulin 1/7/2017       Past Surgical History:   Procedure Laterality Date     COLONOSCOPY  5/26/2011    Procedure:COMBINED COLONOSCOPY, REMOVE TUMOR/POLYP/LESION BY SNARE; Surgeon:BRIANNA TAPIA; Location:[facility]        Family History   Problem Relation Age of Onset     Cancer Mother      Obesity Father      Diabetes Father      Diabetes Brother      Obesity Brother      Diabetes Brother      Obesity Brother        Social History     Socioeconomic History     Marital status:      Spouse name: Not on file     Number of children: Not on file     Years of education: Not on file     Highest education level: Not on file   Occupational History     Not on file   Tobacco Use     Smoking status: Never Smoker     Smokeless tobacco: Never Used   Substance and Sexual Activity     Alcohol use: Yes     Comment: rare     Drug use: No     Sexual activity: Yes     Partners: Female   Other Topics Concern     Parent/sibling w/ CABG, MI or angioplasty before 65F 55M? Not Asked   Social History Narrative     Not on file     Social Determinants of Health     Financial Resource Strain:      Difficulty of Paying Living Expenses:    Food Insecurity:       Worried About Running Out of Food in the Last Year:      Ran Out of Food in the Last Year:    Transportation Needs:      Lack of Transportation (Medical):      Lack of Transportation (Non-Medical):    Physical Activity:      Days of Exercise per Week:      Minutes of Exercise per Session:    Stress:      Feeling of Stress :    Social Connections:      Frequency of Communication with Friends and Family:      Frequency of Social Gatherings with Friends and Family:      Attends Jew Services:      Active Member of Clubs or Organizations:      Attends Club or Organization Meetings:      Marital Status:    Intimate Partner Violence:      Fear of Current or Ex-Partner:      Emotionally Abused:      Physically Abused:      Sexually Abused:        Outpatient Encounter Medications as of 10/27/2021   Medication Sig Dispense Refill     albuterol (PROAIR HFA/PROVENTIL HFA/VENTOLIN HFA) 108 (90 Base) MCG/ACT inhaler INHALE 2 PUFFS INTO THE LUNGS EVERY 6 HOURS AS NEEDED FOR SHORTNESS OF BREATH/DYSPNEA OR WHEEZING 8.5 Inhaler 1     albuterol (PROVENTIL HFA) 108 (90 BASE) MCG/ACT Inhaler Inhale 2 puffs into the lungs every 6 hours as needed for shortness of breath / dyspnea 3 Inhaler 3     amLODIPine (NORVASC) 10 MG tablet TAKE 1 TABLET BY MOUTH EVERY DAY 90 tablet 1     Ascorbic Acid (VITAMIN C CR PO) Take 1,000 mg by mouth daily.       aspirin 81 MG chewable tablet Take 81 mg by mouth daily.       blood glucose (NO BRAND SPECIFIED) test strip Use to test blood sugar 1-3 times daily or as directed. 300 strip 1     blood glucose monitoring (NO BRAND SPECIFIED) meter device kit Use to test blood sugar 1-3 times daily or as directed. 1 kit 0     cetirizine (ZYRTEC) 10 MG tablet Take 10 mg by mouth daily.       Cholecalciferol (VITAMIN D) 1000 UNIT capsule Take 1 capsule by mouth daily.       fluticasone-salmeterol (ADVAIR DISKUS) 250-50 MCG/DOSE inhaler +++NEED ANNUAL EXAM+++INHALE 1 PUFF INTO THE LUNGS EVERY 12 HOURS 180 each 0      KLOR-CON 20 MEQ CR tablet TAKE 1 TABLET BY MOUTH DAILY 90 tablet 3     losartan (COZAAR) 100 MG tablet TAKE 1 TABLET BY MOUTH EVERY DAY 90 tablet 1     metFORMIN (GLUCOPHAGE-XR) 500 MG 24 hr tablet Take 2 tablets (1,000 mg) by mouth 2 times daily (with meals) 360 tablet 1     metoprolol succinate ER (TOPROL-XL) 50 MG 24 hr tablet TAKE 1 TABLET (50 MG) BY MOUTH DAILY FOR BLOOD PRESSURE. 90 tablet 1     MULTI-VITAMIN OR Once daily       Omega-3 Fatty Acids (FISH OIL PO) Take  by mouth 2 times daily.       omeprazole (PRILOSEC) 20 MG capsule Take 20 mg by mouth daily.       simvastatin (ZOCOR) 20 MG tablet TAKE 1 TABLET BY MOUTH EVERYDAY AT BEDTIME 90 tablet 1     tamsulosin (FLOMAX) 0.4 MG capsule Take 1 capsule (0.4 mg) by mouth At Bedtime 30 capsule 1     tiZANidine (ZANAFLEX) 4 MG tablet Take 1 tablet (4 mg) by mouth 3 times daily as needed for muscle spasms 30 tablet 1     No facility-administered encounter medications on file as of 10/27/2021.             O:   NAD, WDWN, Alert & Oriented, Mood & Affect wnl, Vitals stable   Here today alone   There were no vitals taken for this visit.   General appearance normal   Vitals stable   Alert, oriented and in no acute distress     Mid lower back nodule with comedone  Groin skin tags  Nose blanchable red macule    Stuck on papules and brown macules on trunk and ext   Red papules on trunk  Flesh colored papules on trunk     The remainder of the full exam was normal; the following areas were examined:  conjunctiva/lids, oral mucosa, neck, peripheral vascular system, abdomen, lymph nodes, digits/nails, eccrine and apocrine glands, scalp/hair, face, neck, chest, abdomen, buttocks, back, RUE, LUE, RLE, LLE       Eyes: Conjunctivae/lids:Normal     ENT: Lips, buccal mucosa, tongue: normal    MSK:Normal    Cardiovascular: peripheral edema none    Pulm: Breathing Normal    Lymph Nodes: No Head and Neck Lymphadenopathy     Neuro/Psych: Orientation:Alert and Orientedx3 ;  Mood/Affect:normal       A/P:  1. Seborrheic keratosis, lentigo, angioma, dermal nevus, skin tags, epidermal cyst  It was a pleasure speaking to Bi Bishop today.  Previous clinic notes and pertinent laboratory tests were reviewed prior to Bi Bishop's visit.    Nature and genetics of benign skin lesions dicussed with patient.  Signs and Symptoms of skin cancer discussed with patient.  Patient encouraged to perform monthly skin exams.  UV precautions reviewed with patient.  Return to clinic 12 months

## 2021-10-27 NOTE — LETTER
10/27/2021         RE: Bi Bishop  [address] [facility] [id number]        Dear Colleague,    Thank you for referring your patient, Bi Bishop, to the [facility]. Please see a copy of my visit note below.    Bi Bishop is an extremely pleasant 61 year old year old male patient here today for spot on nose, back, groin and arms.   .   Patient states this has been present for a while.  Patient reports the following symptoms:  none.  Patient reports the following previous treatments none.  These treatments did not work.  Patient reports the following modifying factors none.  Associated symptoms: none.  Patient has no other skin complaints today.  Remainder of the HPI, Meds, PMH, Allergies, FH, and SH was reviewed in chart.      Past Medical History:   Diagnosis Date     Allergic urticaria 1/15/08     Hypertension goal BP (blood pressure) < 130/80 4/21/2011     Obesity, unspecified 1/15/08     Uncontrolled type 2 diabetes mellitus without complication, without long-term current use of insulin 1/7/2017       Past Surgical History:   Procedure Laterality Date     COLONOSCOPY  5/26/2011    Procedure:COMBINED COLONOSCOPY, REMOVE TUMOR/POLYP/LESION BY SNARE; Surgeon:BRIANNA TAPIA; Location:[facility]        Family History   Problem Relation Age of Onset     Cancer Mother      Obesity Father      Diabetes Father      Diabetes Brother      Obesity Brother      Diabetes Brother      Obesity Brother        Social History     Socioeconomic History     Marital status:      Spouse name: Not on file     Number of children: Not on file     Years of education: Not on file     Highest education level: Not on file   Occupational History     Not on file   Tobacco Use     Smoking status: Never Smoker     Smokeless tobacco: Never Used   Substance and Sexual Activity     Alcohol use: Yes     Comment: rare     Drug use: No     Sexual activity: Yes     Partners: Female   Other Topics  Concern     Parent/sibling w/ CABG, MI or angioplasty before 65F 55M? Not Asked   Social History Narrative     Not on file     Social Determinants of Health     Financial Resource Strain:      Difficulty of Paying Living Expenses:    Food Insecurity:      Worried About Running Out of Food in the Last Year:      Ran Out of Food in the Last Year:    Transportation Needs:      Lack of Transportation (Medical):      Lack of Transportation (Non-Medical):    Physical Activity:      Days of Exercise per Week:      Minutes of Exercise per Session:    Stress:      Feeling of Stress :    Social Connections:      Frequency of Communication with Friends and Family:      Frequency of Social Gatherings with Friends and Family:      Attends Islam Services:      Active Member of Clubs or Organizations:      Attends Club or Organization Meetings:      Marital Status:    Intimate Partner Violence:      Fear of Current or Ex-Partner:      Emotionally Abused:      Physically Abused:      Sexually Abused:        Outpatient Encounter Medications as of 10/27/2021   Medication Sig Dispense Refill     albuterol (PROAIR HFA/PROVENTIL HFA/VENTOLIN HFA) 108 (90 Base) MCG/ACT inhaler INHALE 2 PUFFS INTO THE LUNGS EVERY 6 HOURS AS NEEDED FOR SHORTNESS OF BREATH/DYSPNEA OR WHEEZING 8.5 Inhaler 1     albuterol (PROVENTIL HFA) 108 (90 BASE) MCG/ACT Inhaler Inhale 2 puffs into the lungs every 6 hours as needed for shortness of breath / dyspnea 3 Inhaler 3     amLODIPine (NORVASC) 10 MG tablet TAKE 1 TABLET BY MOUTH EVERY DAY 90 tablet 1     Ascorbic Acid (VITAMIN C CR PO) Take 1,000 mg by mouth daily.       aspirin 81 MG chewable tablet Take 81 mg by mouth daily.       blood glucose (NO BRAND SPECIFIED) test strip Use to test blood sugar 1-3 times daily or as directed. 300 strip 1     blood glucose monitoring (NO BRAND SPECIFIED) meter device kit Use to test blood sugar 1-3 times daily or as directed. 1 kit 0     cetirizine (ZYRTEC) 10 MG tablet  Take 10 mg by mouth daily.       Cholecalciferol (VITAMIN D) 1000 UNIT capsule Take 1 capsule by mouth daily.       fluticasone-salmeterol (ADVAIR DISKUS) 250-50 MCG/DOSE inhaler +++NEED ANNUAL EXAM+++INHALE 1 PUFF INTO THE LUNGS EVERY 12 HOURS 180 each 0     KLOR-CON 20 MEQ CR tablet TAKE 1 TABLET BY MOUTH DAILY 90 tablet 3     losartan (COZAAR) 100 MG tablet TAKE 1 TABLET BY MOUTH EVERY DAY 90 tablet 1     metFORMIN (GLUCOPHAGE-XR) 500 MG 24 hr tablet Take 2 tablets (1,000 mg) by mouth 2 times daily (with meals) 360 tablet 1     metoprolol succinate ER (TOPROL-XL) 50 MG 24 hr tablet TAKE 1 TABLET (50 MG) BY MOUTH DAILY FOR BLOOD PRESSURE. 90 tablet 1     MULTI-VITAMIN OR Once daily       Omega-3 Fatty Acids (FISH OIL PO) Take  by mouth 2 times daily.       omeprazole (PRILOSEC) 20 MG capsule Take 20 mg by mouth daily.       simvastatin (ZOCOR) 20 MG tablet TAKE 1 TABLET BY MOUTH EVERYDAY AT BEDTIME 90 tablet 1     tamsulosin (FLOMAX) 0.4 MG capsule Take 1 capsule (0.4 mg) by mouth At Bedtime 30 capsule 1     tiZANidine (ZANAFLEX) 4 MG tablet Take 1 tablet (4 mg) by mouth 3 times daily as needed for muscle spasms 30 tablet 1     No facility-administered encounter medications on file as of 10/27/2021.             O:   NAD, WDWN, Alert & Oriented, Mood & Affect wnl, Vitals stable   Here today alone   There were no vitals taken for this visit.   General appearance normal   Vitals stable   Alert, oriented and in no acute distress     Mid lower back nodule with comedone  Groin skin tags  Nose blanchable red macule    Stuck on papules and brown macules on trunk and ext   Red papules on trunk  Flesh colored papules on trunk     The remainder of the full exam was normal; the following areas were examined:  conjunctiva/lids, oral mucosa, neck, peripheral vascular system, abdomen, lymph nodes, digits/nails, eccrine and apocrine glands, scalp/hair, face, neck, chest, abdomen, buttocks, back, RUE, LUE, RLE, LLE       Eyes:  Conjunctivae/lids:Normal     ENT: Lips, buccal mucosa, tongue: normal    MSK:Normal    Cardiovascular: peripheral edema none    Pulm: Breathing Normal    Lymph Nodes: No Head and Neck Lymphadenopathy     Neuro/Psych: Orientation:Alert and Orientedx3 ; Mood/Affect:normal       A/P:  1. Seborrheic keratosis, lentigo, angioma, dermal nevus, skin tags, epidermal cyst  It was a pleasure speaking to Bi Bishop today.  Previous clinic notes and pertinent laboratory tests were reviewed prior to Bi Bishop's visit.    Nature and genetics of benign skin lesions dicussed with patient.  Signs and Symptoms of skin cancer discussed with patient.  Patient encouraged to perform monthly skin exams.  UV precautions reviewed with patient.  Return to clinic 12 months        Again, thank you for allowing me to participate in the care of your patient.        Sincerely,        Jatinder Elizondo MD

## 2021-10-29 RX ORDER — METFORMIN HCL 500 MG
TABLET, EXTENDED RELEASE 24 HR ORAL
Qty: 180 TABLET | Refills: 1 | OUTPATIENT
Start: 2021-10-29

## 2021-11-01 DIAGNOSIS — J45.40 MODERATE PERSISTENT ASTHMA WITHOUT COMPLICATION: ICD-10-CM

## 2021-11-01 NOTE — TELEPHONE ENCOUNTER
Patient states that his insurance needs pre approval for his surgery for kidney stones and needs paperwork to back it up.  Please fax to [phone] [employer].

## 2021-11-09 DIAGNOSIS — I10 HYPERTENSION, BENIGN ESSENTIAL, GOAL BELOW 140/90: ICD-10-CM

## 2021-11-09 NOTE — TELEPHONE ENCOUNTER
Can you call beforehand to ensure you are sending them exactly what they need to approve coverage for kidney stone procedure    [place] [facility] [phone]      Patient info if needed   Group #    Patient name ID# [id number]      Call patient if questions or to advise when done

## 2021-11-10 PROBLEM — N20.0 KIDNEY STONE: Status: ACTIVE | Noted: 2021-11-10

## 2021-11-10 NOTE — TELEPHONE ENCOUNTER
Type of surgery: Lithotripsy extracorporeal shock wave ESWL with possible cystoscopy and ureteral stent insertion bilateral   CPT 98637, poss [id number], [id number]    Location of surgery: [facility]  Date and time of surgery: 12/16/2021  Surgeon: Sariah  Pre-Op Appt Date: 12/01/2021  Post-Op Appt Date: 01/03/2021   Packet sent out: Yes  Pre-cert/Authorization completed:    Call to insurance [phone], per automated message this is not an insurance. I spoke to rep Jailyn and she also said this is not like other insurances, they do not need prior auth they just need prior notification. She took all the information about surgery and she gave me numbefr [id number]. She said to call back if surgery changes  Date: 11/10/21    Thank you,   Sharon Leahy   Prior Authorization Department  [phone]

## 2021-11-12 RX ORDER — METOPROLOL SUCCINATE 50 MG/1
50 TABLET, EXTENDED RELEASE ORAL DAILY
Qty: 90 TABLET | Refills: 1 | Status: SHIPPED | OUTPATIENT
Start: 2021-11-12 | End: 2022-05-20

## 2021-11-12 NOTE — TELEPHONE ENCOUNTER
Routing refill request to provider for review/approval because:  Drug not on the [facility] refill protocol   BP Readings from Last 3 Encounters:   10/27/21 (!) 152/93   10/26/21 (!) 148/95   09/10/21 (!) 144/89

## 2021-11-20 DIAGNOSIS — E87.6 HYPOKALEMIA: ICD-10-CM

## 2021-11-22 DIAGNOSIS — N40.1 BENIGN PROSTATIC HYPERPLASIA WITH LOWER URINARY TRACT SYMPTOMS, SYMPTOM DETAILS UNSPECIFIED: ICD-10-CM

## 2021-11-22 DIAGNOSIS — E78.5 HYPERLIPIDEMIA LDL GOAL <100: ICD-10-CM

## 2021-11-22 RX ORDER — TAMSULOSIN HYDROCHLORIDE 0.4 MG/1
0.4 CAPSULE ORAL AT BEDTIME
Qty: 90 CAPSULE | Refills: 3 | Status: SHIPPED | OUTPATIENT
Start: 2021-11-22

## 2021-11-23 DIAGNOSIS — Z11.59 ENCOUNTER FOR SCREENING FOR OTHER VIRAL DISEASES: ICD-10-CM

## 2021-11-24 RX ORDER — POTASSIUM CHLORIDE 1500 MG/1
TABLET, EXTENDED RELEASE ORAL
Qty: 90 TABLET | Refills: 3 | Status: SHIPPED | OUTPATIENT
Start: 2021-11-24

## 2021-11-24 RX ORDER — SIMVASTATIN 20 MG
TABLET ORAL
Qty: 90 TABLET | Refills: 0 | Status: SHIPPED | OUTPATIENT
Start: 2021-11-24 | End: 2022-02-19

## 2021-12-01 ENCOUNTER — OFFICE VISIT (OUTPATIENT)
Dept: FAMILY MEDICINE | Facility: CLINIC | Age: 61
End: 2021-12-01

## 2021-12-01 VITALS
HEIGHT: 75 IN | TEMPERATURE: 97 F | SYSTOLIC BLOOD PRESSURE: 138 MMHG | BODY MASS INDEX: 39.17 KG/M2 | RESPIRATION RATE: 20 BRPM | OXYGEN SATURATION: 96 % | WEIGHT: 315 LBS | HEART RATE: 80 BPM | DIASTOLIC BLOOD PRESSURE: 86 MMHG

## 2021-12-01 DIAGNOSIS — Z01.818 PREOP GENERAL PHYSICAL EXAM: Primary | ICD-10-CM

## 2021-12-01 DIAGNOSIS — E78.5 HYPERLIPIDEMIA LDL GOAL <100: ICD-10-CM

## 2021-12-01 DIAGNOSIS — I10 HYPERTENSION, BENIGN ESSENTIAL, GOAL BELOW 140/90: ICD-10-CM

## 2021-12-01 DIAGNOSIS — Z12.11 SCREEN FOR COLON CANCER: ICD-10-CM

## 2021-12-01 DIAGNOSIS — E66.01 MORBID OBESITY (H): ICD-10-CM

## 2021-12-01 DIAGNOSIS — Z23 NEED FOR VACCINATION: ICD-10-CM

## 2021-12-01 DIAGNOSIS — N20.0 KIDNEY STONE: ICD-10-CM

## 2021-12-01 DIAGNOSIS — E11.9 CONTROLLED TYPE 2 DIABETES MELLITUS WITHOUT COMPLICATION, WITHOUT LONG-TERM CURRENT USE OF INSULIN (H): ICD-10-CM

## 2021-12-01 DIAGNOSIS — J45.40 MODERATE PERSISTENT ASTHMA WITHOUT COMPLICATION: ICD-10-CM

## 2021-12-01 LAB
ANION GAP SERPL CALCULATED.3IONS-SCNC: 5 MMOL/L (ref 3–14)
BUN SERPL-MCNC: 20 MG/DL (ref 7–30)
CALCIUM SERPL-MCNC: 9 MG/DL (ref 8.5–10.1)
CHLORIDE BLD-SCNC: 103 MMOL/L (ref 94–109)
CHOLEST SERPL-MCNC: 118 MG/DL
CO2 SERPL-SCNC: 31 MMOL/L (ref 20–32)
CREAT SERPL-MCNC: 1 MG/DL (ref 0.66–1.25)
FASTING STATUS PATIENT QL REPORTED: YES
GFR SERPL CREATININE-BSD FRML MDRD: 80 ML/MIN/1.73M2
GLUCOSE BLD-MCNC: 111 MG/DL (ref 70–99)
HBA1C MFR BLD: 6 % (ref 0–5.6)
HDLC SERPL-MCNC: 35 MG/DL
LDLC SERPL CALC-MCNC: 54 MG/DL
NONHDLC SERPL-MCNC: 83 MG/DL
POTASSIUM BLD-SCNC: 3.4 MMOL/L (ref 3.4–5.3)
SODIUM SERPL-SCNC: 139 MMOL/L (ref 133–144)
TRIGL SERPL-MCNC: 146 MG/DL

## 2021-12-01 PROCEDURE — 80048 BASIC METABOLIC PNL TOTAL CA: CPT | Performed by: FAMILY MEDICINE

## 2021-12-01 PROCEDURE — 36415 COLL VENOUS BLD VENIPUNCTURE: CPT | Performed by: FAMILY MEDICINE

## 2021-12-01 PROCEDURE — 80061 LIPID PANEL: CPT | Performed by: FAMILY MEDICINE

## 2021-12-01 PROCEDURE — 99214 OFFICE O/P EST MOD 30 MIN: CPT | Performed by: FAMILY MEDICINE

## 2021-12-01 PROCEDURE — 99207 PR FOOT EXAM NO CHARGE: CPT | Performed by: FAMILY MEDICINE

## 2021-12-01 PROCEDURE — 83036 HEMOGLOBIN GLYCOSYLATED A1C: CPT | Performed by: FAMILY MEDICINE

## 2021-12-01 RX ORDER — PHENOL 1.4 %
20 AEROSOL, SPRAY (ML) MUCOUS MEMBRANE
COMMUNITY

## 2021-12-01 ASSESSMENT — MIFFLIN-ST. JEOR: SCORE: 2377.5

## 2021-12-01 ASSESSMENT — PAIN SCALES - GENERAL: PAINLEVEL: NO PAIN (0)

## 2021-12-01 NOTE — H&P (VIEW-ONLY)
[facility]  [address]  [place] [address]  Phone: [phone]  Primary Provider: Paty Badillo  Pre-op Performing Provider: PATY BADILLO      PREOPERATIVE EVALUATION:  Today's date: 12/1/2021    Bi Bishop is a 61 year old male who presents for a preoperative evaluation.    Surgical Information:  Surgery/Procedure: LITHOTRIPSY, EXTRACORPOREAL SHOCK WAVE (ESWL), WITH POSSIBLE CYSTOSCOPY AND URETERAL STENT INSERTION  Surgery Location: [facility]  Surgeon: Jatinder Rolle MD  Surgery Date: 12/16/21  Time of Surgery: 11:55 AM  Where patient plans to recover: At home with family  Fax number for surgical facility: Note does not need to be faxed, will be available electronically in Epic.    Type of Anesthesia Anticipated: General    *DO ACT*    Asthma Follow-Up    Was ACT completed today?    Yes    ACT Total Scores 12/1/2021   ACT TOTAL SCORE -   ASTHMA ER VISITS -   ASTHMA HOSPITALIZATIONS -   ACT TOTAL SCORE (Goal Greater than or Equal to 20) 20   In the past 12 months, how many times did you visit the emergency room for your asthma without being admitted to the hospital? 0   In the past 12 months, how many times were you hospitalized overnight because of your asthma? 0          How many days per week do you miss taking your asthma controller medication?  0    Please describe any recent triggers for your asthma: laughing, bending    Have you had any Emergency Room Visits, Urgent Care Visits, or Hospital Admissions since your last office visit?  No      Diabetes Follow-up    How often are you checking your blood sugar? A few times a week  What time of day are you checking your blood sugars (select all that apply)?  Before meals  Have you had any blood sugars above 200?  No  Have you had any blood sugars below 70?  No    What symptoms do you notice when your blood sugar is low?  None    What concerns do you have today about  your diabetes? None     Do you have any of these symptoms? (Select all that apply)  No numbness or tingling in feet.  No redness, sores or blisters on feet.  No complaints of excessive thirst.  No reports of blurry vision.  No significant changes to weight.    Have you had a diabetic eye exam in the last 12 months? Yes- Date of last eye exam: 9/2021,  Location: [facility]          Hyperlipidemia Follow-Up      Are you regularly taking any medication or supplement to lower your cholesterol?   Yes- simvastatin    Are you having muscle aches or other side effects that you think could be caused by your cholesterol lowering medication?  No    Hypertension Follow-up      Do you check your blood pressure regularly outside of the clinic? Yes     Are you following a low salt diet? Yes    Are your blood pressures ever more than 140 on the top number (systolic) OR more   than 90 on the bottom number (diastolic), for example 140/90? Yes    BP Readings from Last 2 Encounters:   12/01/21 138/86   10/27/21 (!) 152/93     Hemoglobin A1C (%)   Date Value   12/01/2021 6.0 (H)   08/19/2020 6.2 (H)   11/13/2019 6.6 (H)     LDL Cholesterol Calculated (mg/dL)   Date Value   12/01/2021 54   08/19/2020 52   08/06/2019 55       Assessment & Plan     The proposed surgical procedure is considered INTERMEDIATE risk.    (Z01.818) Preop general physical exam  (primary encounter diagnosis)  Comment: No absolute medical contraindications.  Plan: May proceed with surgery as scheduled.    (N20.0) Kidney stone  Comment: Bilateral large kidney stones, given size, they will not pass on their own.  Plan: Agree with surgical treatment.    (E11.9) Controlled type 2 diabetes mellitus without complication, without long-term current use of insulin (H)  Comment: Very good control using single drug therapy. Very good renal function, no indication for holding Metformin prior to surgery.  Lab Results   Component Value Date    A1C 6.0 12/01/2021    A1C 6.2 08/19/2020     A1C 6.6 11/13/2019    A1C 6.2 08/06/2019    A1C 5.6 05/24/2018    A1C 5.9 03/10/2017        Plan: HEMOGLOBIN A1C, FOOT EXAM, Basic metabolic         panel  (Ca, Cl, CO2, Creat, Gluc, K, Na, BUN),         CANCELED: Albumin Random Urine Quantitative         with Creat Ratio        (Z12.11) Screen for colon cancer  Plan: TALI([employer])            (E78.5) Hyperlipidemia LDL goal <100  Comment: Tolerating moderate intensity statin therapy well, no documented secondary complications. LDL within guidelines.  Plan: Lipid panel reflex to direct LDL Non-fasting        Continue.      (J45.40) Moderate persistent asthma without complication  Comment: Doing well with controller medication.  Plan: Should not interfere with surgery.    (E66.01) Morbid obesity (H)  Comment: Reviewed lifestyle.      (I10) Hypertension, benign essential, goal below 140/90  Comment: High normal, currently on 2 drug regiment, ARB plus beta-blocker therapy.  Plan: Continue current medications, given higher blood pressure reading advised not to hold ARB prior to surgery.    Results for orders placed or performed in visit on 12/01/21   HEMOGLOBIN A1C     Status: Abnormal   Result Value Ref Range    Hemoglobin A1C 6.0 (H) 0.0 - 5.6 %   Lipid panel reflex to direct LDL Non-fasting     Status: Abnormal   Result Value Ref Range    Cholesterol 118 <200 mg/dL    Triglycerides 146 <150 mg/dL    Direct Measure HDL 35 (L) >=40 mg/dL    LDL Cholesterol Calculated 54 <=100 mg/dL    Non HDL Cholesterol 83 <130 mg/dL    Patient Fasting > 8hrs? Yes     Narrative    Cholesterol  Desirable:  <200 mg/dL    Triglycerides  Normal:  Less than 150 mg/dL  Borderline High:  150-199 mg/dL  High:  200-499 mg/dL  Very High:  Greater than or equal to 500 mg/dL    Direct Measure HDL  Female:  Greater than or equal to 50 mg/dL   Male:  Greater than or equal to 40 mg/dL    LDL Cholesterol  Desirable:  <100mg/dL  Above Desirable:  100-129 mg/dL   Borderline High:  130-159  mg/dL   High:  160-189 mg/dL   Very High:  >= 190 mg/dL    Non HDL Cholesterol  Desirable:  130 mg/dL  Above Desirable:  130-159 mg/dL  Borderline High:  160-189 mg/dL  High:  190-219 mg/dL  Very High:  Greater than or equal to 220 mg/dL   Basic metabolic panel  (Ca, Cl, CO2, Creat, Gluc, K, Na, BUN)     Status: Abnormal   Result Value Ref Range    Sodium 139 133 - 144 mmol/L    Potassium 3.4 3.4 - 5.3 mmol/L    Chloride 103 94 - 109 mmol/L    Carbon Dioxide (CO2) 31 20 - 32 mmol/L    Anion Gap 5 3 - 14 mmol/L    Urea Nitrogen 20 7 - 30 mg/dL    Creatinine 1.00 0.66 - 1.25 mg/dL    Calcium 9.0 8.5 - 10.1 mg/dL    Glucose 111 (H) 70 - 99 mg/dL    GFR Estimate 80 >60 mL/min/1.73m2             Risks and Recommendations:  The patient has the following additional risks and recommendations for perioperative complications:   - Morbid obesity (BMI >40)  Pulmonary:    - Incentive spirometry post-op    Medication Instructions:   - aspirin: Discontinue aspirin 7-10 days prior to procedure to reduce bleeding risk. It should be resumed postoperatively.     RECOMMENDATION:  APPROVAL GIVEN to proceed with proposed procedure, without further diagnostic evaluation.    [id number]}    Subjective     HPI related to upcoming procedure: Bilateral symptomatic large kidney stones. Given size, they will not pass on their own. Agree with lithotripsy.      Preop Questions 12/1/2021   1. Have you ever had a heart attack or stroke? No   2. Have you ever had surgery on your heart or blood vessels, such as a stent placement, a coronary artery bypass, or surgery on an artery in your head, neck, heart, or legs? No   3. Do you have chest pain with activity? No   4. Do you have a history of  heart failure? No   5. Do you currently have a cold, bronchitis or symptoms of other infection? No   6. Do you have a cough, shortness of breath, or wheezing? No   7. Do you or anyone in your family have previous history of blood clots? YES -no personal history.   8.  Do you or does anyone in your family have a serious bleeding problem such as prolonged bleeding following surgeries or cuts? YES -personal, given aspirin therapy.   9. Have you ever had problems with anemia or been told to take iron pills? No   10. Have you had any abnormal blood loss such as black, tarry or bloody stools? No   11. Have you ever had a blood transfusion? No   12. Are you willing to have a blood transfusion if it is medically needed before, during, or after your surgery? Yes   13. Have you or any of your relatives ever had problems with anesthesia? No   14. Do you have sleep apnea, excessive snoring or daytime drowsiness? No   15. Do you have any artifical heart valves or other implanted medical devices like a pacemaker, defibrillator, or continuous glucose monitor? No   16. Do you have artificial joints? No   17. Are you allergic to latex? No     Health Care Directive:  Patient does not have a Health Care Directive or Living Will: Discussed advance care planning with patient; information given to patient to review.    Preoperative Review of :   reviewed - controlled substances reflected in medication list.      Status of Chronic Conditions:  See problem list for active medical problems.  Problems all longstanding and stable, except as noted/documented.  See ROS for pertinent symptoms related to these conditions.      Review of Systems  Constitutional, neuro, ENT, endocrine, pulmonary, cardiac, gastrointestinal, genitourinary, musculoskeletal, integument and psychiatric systems are negative, except as otherwise noted.    Patient Active Problem List    Diagnosis Date Noted     Kidney stone 11/10/2021     Priority: Medium     Added automatically from request for surgery [id number]       Nocturia 11/11/2020     Priority: Medium     Morbid obesity (H) 08/09/2019     Priority: Medium     Hyperlipidemia LDL goal <100 03/16/2017     Priority: Medium     Controlled type 2 diabetes mellitus, without  long-term current use of insulin (H) 01/07/2017     Priority: Medium     Advanced directives, counseling/discussion 01/04/2017     Priority: Medium     Advance Care Planning 1/4/2017: Information declined             Hypokalemia 06/03/2015     Priority: Medium     Degenerative joint disease of knee, left 06/03/2013     Priority: Medium     24 hour contact handout given 05/02/2012     Priority: Medium     EMERGENCY CARE PLAN  Presenting Problem Signs and Symptoms Treatment Plan    Questions or conerns during clinic hours    I will call the clinic directly     Questions or conerns outside clinic hours    I will call the 24 hour nurse line at [phone]    Patient needs to schedule an appointment    I will call the 24 hour scheduling team at [phone] or clinic directly    Same day treatment     I will call the clinic first, nurse line if after hours, urgent care and express care if needed                                 Hypertension, benign essential, goal below 140/90 04/21/2011     Priority: Medium     December 14, 2012 within guide lines on high dose ace inhibitor, HCTZ, and norvasc. Will repeat echo next year. If there is still LVH on echo, consider adding a beta blocker       CARDIOVASCULAR SCREENING; LDL GOAL LESS THAN 130 10/31/2010     Priority: Medium     LVH (left ventricular hypertrophy) 10/20/2010     Priority: Medium     December 14, 2012 seen on echo, 2010. On ace inhibitor, not  beta blocker given asthma. Will repeat echo in 1 year.        Moderate persistent asthma 06/18/2008     Priority: Medium     April 7, 2014   well controlled on Advair, using albuterol inhaler rarely. One year refill. Note: other control medications, such as Symbicort, were not as effective.       Erectile dysfuntion 06/18/2008     Priority: Medium     April 4, 2014  using Levitra. No contraindications.       GERD (gastroesophageal reflux disease) 06/18/2008     Priority: Medium     April 4, 2014  Doing well on chronic PPI  therapy.         Past Medical History:   Diagnosis Date     Allergic urticaria 1/15/08     Hypertension goal BP (blood pressure) < 130/80 4/21/2011     Obesity, unspecified 1/15/08     Uncontrolled type 2 diabetes mellitus without complication, without long-term current use of insulin 1/7/2017     Past Surgical History:   Procedure Laterality Date     COLONOSCOPY  5/26/2011    Procedure:COMBINED COLONOSCOPY, REMOVE TUMOR/POLYP/LESION BY SNARE; Surgeon:BRIANNA TAPIA; Location:[facility]     Current Outpatient Medications   Medication Sig Dispense Refill     albuterol (PROAIR HFA/PROVENTIL HFA/VENTOLIN HFA) 108 (90 Base) MCG/ACT inhaler INHALE 2 PUFFS INTO THE LUNGS EVERY 6 HOURS AS NEEDED FOR SHORTNESS OF BREATH/DYSPNEA OR WHEEZING 8.5 Inhaler 1     amLODIPine (NORVASC) 10 MG tablet TAKE 1 TABLET BY MOUTH EVERY DAY 90 tablet 1     Ascorbic Acid (VITAMIN C CR PO) Take 1,000 mg by mouth daily.       aspirin 81 MG chewable tablet Take 81 mg by mouth daily.       blood glucose (NO BRAND SPECIFIED) test strip Use to test blood sugar 1-3 times daily or as directed. 300 strip 1     blood glucose monitoring (NO BRAND SPECIFIED) meter device kit Use to test blood sugar 1-3 times daily or as directed. 1 kit 0     cetirizine (ZYRTEC) 10 MG tablet Take 10 mg by mouth daily.       fluticasone-salmeterol (ADVAIR DISKUS) 250-50 MCG/DOSE inhaler INHALE 1 PUFF INTO THE LUNGS EVERY 12 HOURS 180 each 3     HYDROmorphone (DILAUDID) 4 MG tablet Take 1 tablet (4 mg) by mouth every 6 hours as needed for severe pain 20 tablet 0     KLOR-CON 20 MEQ CR tablet TAKE 1 TABLET BY MOUTH DAILY 90 tablet 3     losartan (COZAAR) 100 MG tablet TAKE 1 TABLET BY MOUTH EVERY DAY 90 tablet 1     Melatonin 10 MG TABS tablet Take 20 mg by mouth nightly as needed for sleep       metFORMIN (GLUCOPHAGE-XR) 500 MG 24 hr tablet Take 2 tablets (1,000 mg) by mouth 2 times daily (with meals) 360 tablet 1     metoprolol succinate ER (TOPROL-XL) 50 MG 24 hr tablet  "Take 1 tablet (50 mg) by mouth daily For blood pressure. 90 tablet 1     MULTI-VITAMIN OR Once daily       Omega-3 Fatty Acids (FISH OIL PO) Take  by mouth 2 times daily.       omeprazole (PRILOSEC) 20 MG capsule Take 20 mg by mouth daily.       simvastatin (ZOCOR) 20 MG tablet +++NEED APPT+++TAKE 1 TABLET BY MOUTH EVERYDAY AT BEDTIME 90 tablet 0     tamsulosin (FLOMAX) 0.4 MG capsule Take 1 capsule (0.4 mg) by mouth At Bedtime 90 capsule 3     Cholecalciferol (VITAMIN D) 1000 UNIT capsule Take 1 capsule by mouth daily. (Patient not taking: Reported on 12/1/2021)       tiZANidine (ZANAFLEX) 4 MG tablet Take 1 tablet (4 mg) by mouth 3 times daily as needed for muscle spasms (Patient not taking: Reported on 12/1/2021) 30 tablet 1       Allergies   Allergen Reactions     Cats      Grass      Hazelnut ([place]) Allergy Skin Test         Social History     Tobacco Use     Smoking status: Never Smoker     Smokeless tobacco: Never Used   Substance Use Topics     Alcohol use: Yes     Comment: rare       History   Drug Use No         Objective     /86   Pulse 80   Temp 97  F (36.1  C) (Tympanic)   Resp 20   Ht 1.905 m (6' 3\")   Wt 149.7 kg (330 lb)   SpO2 96%   BMI 41.25 kg/m      Physical Exam  GENERAL: Healthy, alert and no distress  EYES: Eyes grossly normal to inspection, conjunctivae and sclerae normal  RESP: Lungs clear to auscultation - no rales, rhonchi or wheezes  CV: Regular rate and rhythm, normal S1 S2, no murmur  MS: No gross musculoskeletal defects noted, no edema  NEURO: Normal strength and tone, mentation intact and speech normal  PSYCH: Mentation appears normal, affect normal/bright     Recent Labs   Lab Test 09/10/21  0911 08/19/20  1001   HGB 14.6  --      --     137   POTASSIUM 3.3* 3.3*   CR 1.01 0.97   A1C  --  6.2*        Diagnostics:  Recent Results (from the past 24 hour(s))   HEMOGLOBIN A1C    Collection Time: 12/01/21  4:07 PM   Result Value Ref Range    Hemoglobin A1C 6.0 " (H) 0.0 - 5.6 %   Lipid panel reflex to direct LDL Non-fasting    Collection Time: 12/01/21  4:07 PM   Result Value Ref Range    Cholesterol 118 <200 mg/dL    Triglycerides 146 <150 mg/dL    Direct Measure HDL 35 (L) >=40 mg/dL    LDL Cholesterol Calculated 54 <=100 mg/dL    Non HDL Cholesterol 83 <130 mg/dL    Patient Fasting > 8hrs? Yes    Basic metabolic panel  (Ca, Cl, CO2, Creat, Gluc, K, Na, BUN)    Collection Time: 12/01/21  4:07 PM   Result Value Ref Range    Sodium 139 133 - 144 mmol/L    Potassium 3.4 3.4 - 5.3 mmol/L    Chloride 103 94 - 109 mmol/L    Carbon Dioxide (CO2) 31 20 - 32 mmol/L    Anion Gap 5 3 - 14 mmol/L    Urea Nitrogen 20 7 - 30 mg/dL    Creatinine 1.00 0.66 - 1.25 mg/dL    Calcium 9.0 8.5 - 10.1 mg/dL    Glucose 111 (H) 70 - 99 mg/dL    GFR Estimate 80 >60 mL/min/1.73m2      No EKG required, no history of coronary heart disease, significant arrhythmia, peripheral arterial disease or other structural heart disease.    Revised Cardiac Risk Index (RCRI):  The patient has the following serious cardiovascular risks for perioperative complications:   - No serious cardiac risks = 0 points     RCRI Interpretation: 0 points: Class I (very low risk - 0.4% complication rate)           Signed Electronically by: Paty Badillo MD  Copy of this evaluation report is provided to requesting physician.

## 2021-12-01 NOTE — PROGRESS NOTES
[facility]  [address] [facility]  [place] [address]  Phone: [phone]  Primary Provider: Paty Badillo  Pre-op Performing Provider: PATY BADILLO      PREOPERATIVE EVALUATION:  Today's date: 12/1/2021    Bi Bishop is a 61 year old male who presents for a preoperative evaluation.    Surgical Information:  Surgery/Procedure: LITHOTRIPSY, EXTRACORPOREAL SHOCK WAVE (ESWL), WITH POSSIBLE CYSTOSCOPY AND URETERAL STENT INSERTION  Surgery Location: [facility]  Surgeon: Jatinder Rolle MD  Surgery Date: 12/16/21  Time of Surgery: 11:55 AM  Where patient plans to recover: At home with family  Fax number for surgical facility: Note does not need to be faxed, will be available electronically in Epic.    Type of Anesthesia Anticipated: General    *DO ACT*    Asthma Follow-Up    Was ACT completed today?    Yes    ACT Total Scores 12/1/2021   ACT TOTAL SCORE -   ASTHMA ER VISITS -   ASTHMA HOSPITALIZATIONS -   ACT TOTAL SCORE (Goal Greater than or Equal to 20) 20   In the past 12 months, how many times did you visit the emergency room for your asthma without being admitted to the hospital? 0   In the past 12 months, how many times were you hospitalized overnight because of your asthma? 0          How many days per week do you miss taking your asthma controller medication?  0    Please describe any recent triggers for your asthma: laughing, bending    Have you had any Emergency Room Visits, Urgent Care Visits, or Hospital Admissions since your last office visit?  No      Diabetes Follow-up    How often are you checking your blood sugar? A few times a week  What time of day are you checking your blood sugars (select all that apply)?  Before meals  Have you had any blood sugars above 200?  No  Have you had any blood sugars below 70?  No    What symptoms do you notice when your blood sugar is low?  None    What concerns do you have today about  your diabetes? None     Do you have any of these symptoms? (Select all that apply)  No numbness or tingling in feet.  No redness, sores or blisters on feet.  No complaints of excessive thirst.  No reports of blurry vision.  No significant changes to weight.    Have you had a diabetic eye exam in the last 12 months? Yes- Date of last eye exam: 9/2021,  Location: [place]          Hyperlipidemia Follow-Up      Are you regularly taking any medication or supplement to lower your cholesterol?   Yes- simvastatin    Are you having muscle aches or other side effects that you think could be caused by your cholesterol lowering medication?  No    Hypertension Follow-up      Do you check your blood pressure regularly outside of the clinic? Yes     Are you following a low salt diet? Yes    Are your blood pressures ever more than 140 on the top number (systolic) OR more   than 90 on the bottom number (diastolic), for example 140/90? Yes    BP Readings from Last 2 Encounters:   12/01/21 138/86   10/27/21 (!) 152/93     Hemoglobin A1C (%)   Date Value   12/01/2021 6.0 (H)   08/19/2020 6.2 (H)   11/13/2019 6.6 (H)     LDL Cholesterol Calculated (mg/dL)   Date Value   12/01/2021 54   08/19/2020 52   08/06/2019 55       Assessment & Plan     The proposed surgical procedure is considered INTERMEDIATE risk.    (Z01.818) Preop general physical exam  (primary encounter diagnosis)  Comment: No absolute medical contraindications.  Plan: May proceed with surgery as scheduled.    (N20.0) Kidney stone  Comment: Bilateral large kidney stones, given size, they will not pass on their own.  Plan: Agree with surgical treatment.    (E11.9) Controlled type 2 diabetes mellitus without complication, without long-term current use of insulin (H)  Comment: Very good control using single drug therapy. Very good renal function, no indication for holding Metformin prior to surgery.  Lab Results   Component Value Date    A1C 6.0 12/01/2021    A1C 6.2 08/19/2020     A1C 6.6 11/13/2019    A1C 6.2 08/06/2019    A1C 5.6 05/24/2018    A1C 5.9 03/10/2017        Plan: HEMOGLOBIN A1C, FOOT EXAM, Basic metabolic         panel  (Ca, Cl, CO2, Creat, Gluc, K, Na, BUN),         CANCELED: Albumin Random Urine Quantitative         with Creat Ratio        (Z12.11) Screen for colon cancer  Plan: TALI([employer])            (E78.5) Hyperlipidemia LDL goal <100  Comment: Tolerating moderate intensity statin therapy well, no documented secondary complications. LDL within guidelines.  Plan: Lipid panel reflex to direct LDL Non-fasting        Continue.      (J45.40) Moderate persistent asthma without complication  Comment: Doing well with controller medication.  Plan: Should not interfere with surgery.    (E66.01) Morbid obesity (H)  Comment: Reviewed lifestyle.      (I10) Hypertension, benign essential, goal below 140/90  Comment: High normal, currently on 2 drug regiment, ARB plus beta-blocker therapy.  Plan: Continue current medications, given higher blood pressure reading advised not to hold ARB prior to surgery.    Results for orders placed or performed in visit on 12/01/21   HEMOGLOBIN A1C     Status: Abnormal   Result Value Ref Range    Hemoglobin A1C 6.0 (H) 0.0 - 5.6 %   Lipid panel reflex to direct LDL Non-fasting     Status: Abnormal   Result Value Ref Range    Cholesterol 118 <200 mg/dL    Triglycerides 146 <150 mg/dL    Direct Measure HDL 35 (L) >=40 mg/dL    LDL Cholesterol Calculated 54 <=100 mg/dL    Non HDL Cholesterol 83 <130 mg/dL    Patient Fasting > 8hrs? Yes     Narrative    Cholesterol  Desirable:  <200 mg/dL    Triglycerides  Normal:  Less than 150 mg/dL  Borderline High:  150-199 mg/dL  High:  200-499 mg/dL  Very High:  Greater than or equal to 500 mg/dL    Direct Measure HDL  Female:  Greater than or equal to 50 mg/dL   Male:  Greater than or equal to 40 mg/dL    LDL Cholesterol  Desirable:  <100mg/dL  Above Desirable:  100-129 mg/dL   Borderline High:  130-159  mg/dL   High:  160-189 mg/dL   Very High:  >= 190 mg/dL    Non HDL Cholesterol  Desirable:  130 mg/dL  Above Desirable:  130-159 mg/dL  Borderline High:  160-189 mg/dL  High:  190-219 mg/dL  Very High:  Greater than or equal to 220 mg/dL   Basic metabolic panel  (Ca, Cl, CO2, Creat, Gluc, K, Na, BUN)     Status: Abnormal   Result Value Ref Range    Sodium 139 133 - 144 mmol/L    Potassium 3.4 3.4 - 5.3 mmol/L    Chloride 103 94 - 109 mmol/L    Carbon Dioxide (CO2) 31 20 - 32 mmol/L    Anion Gap 5 3 - 14 mmol/L    Urea Nitrogen 20 7 - 30 mg/dL    Creatinine 1.00 0.66 - 1.25 mg/dL    Calcium 9.0 8.5 - 10.1 mg/dL    Glucose 111 (H) 70 - 99 mg/dL    GFR Estimate 80 >60 mL/min/1.73m2             Risks and Recommendations:  The patient has the following additional risks and recommendations for perioperative complications:   - Morbid obesity (BMI >40)  Pulmonary:    - Incentive spirometry post-op    Medication Instructions:   - aspirin: Discontinue aspirin 7-10 days prior to procedure to reduce bleeding risk. It should be resumed postoperatively.     RECOMMENDATION:  APPROVAL GIVEN to proceed with proposed procedure, without further diagnostic evaluation.    [id number]}    Subjective     HPI related to upcoming procedure: Bilateral symptomatic large kidney stones. Given size, they will not pass on their own. Agree with lithotripsy.      Preop Questions 12/1/2021   1. Have you ever had a heart attack or stroke? No   2. Have you ever had surgery on your heart or blood vessels, such as a stent placement, a coronary artery bypass, or surgery on an artery in your head, neck, heart, or legs? No   3. Do you have chest pain with activity? No   4. Do you have a history of  heart failure? No   5. Do you currently have a cold, bronchitis or symptoms of other infection? No   6. Do you have a cough, shortness of breath, or wheezing? No   7. Do you or anyone in your family have previous history of blood clots? YES -no personal history.   8.  Do you or does anyone in your family have a serious bleeding problem such as prolonged bleeding following surgeries or cuts? YES -personal, given aspirin therapy.   9. Have you ever had problems with anemia or been told to take iron pills? No   10. Have you had any abnormal blood loss such as black, tarry or bloody stools? No   11. Have you ever had a blood transfusion? No   12. Are you willing to have a blood transfusion if it is medically needed before, during, or after your surgery? Yes   13. Have you or any of your relatives ever had problems with anesthesia? No   14. Do you have sleep apnea, excessive snoring or daytime drowsiness? No   15. Do you have any artifical heart valves or other implanted medical devices like a pacemaker, defibrillator, or continuous glucose monitor? No   16. Do you have artificial joints? No   17. Are you allergic to latex? No     Health Care Directive:  Patient does not have a Health Care Directive or Living Will: Discussed advance care planning with patient; information given to patient to review.    Preoperative Review of :   reviewed - controlled substances reflected in medication list.      Status of Chronic Conditions:  See problem list for active medical problems.  Problems all longstanding and stable, except as noted/documented.  See ROS for pertinent symptoms related to these conditions.      Review of Systems  Constitutional, neuro, ENT, endocrine, pulmonary, cardiac, gastrointestinal, genitourinary, musculoskeletal, integument and psychiatric systems are negative, except as otherwise noted.    Patient Active Problem List    Diagnosis Date Noted     Kidney stone 11/10/2021     Priority: Medium     Added automatically from request for surgery [id number]       Nocturia 11/11/2020     Priority: Medium     Morbid obesity (H) 08/09/2019     Priority: Medium     Hyperlipidemia LDL goal <100 03/16/2017     Priority: Medium     Controlled type 2 diabetes mellitus, without  long-term current use of insulin (H) 01/07/2017     Priority: Medium     Advanced directives, counseling/discussion 01/04/2017     Priority: Medium     Advance Care Planning 1/4/2017: Information declined             Hypokalemia 06/03/2015     Priority: Medium     Degenerative joint disease of knee, left 06/03/2013     Priority: Medium     24 hour contact handout given 05/02/2012     Priority: Medium     EMERGENCY CARE PLAN  Presenting Problem Signs and Symptoms Treatment Plan    Questions or conerns during clinic hours    I will call the clinic directly     Questions or conerns outside clinic hours    I will call the 24 hour nurse line at [phone]    Patient needs to schedule an appointment    I will call the 24 hour scheduling team at [phone] or clinic directly    Same day treatment     I will call the clinic first, nurse line if after hours, urgent care and express care if needed                                 Hypertension, benign essential, goal below 140/90 04/21/2011     Priority: Medium     December 14, 2012 within guide lines on high dose ace inhibitor, HCTZ, and norvasc. Will repeat echo next year. If there is still LVH on echo, consider adding a beta blocker       CARDIOVASCULAR SCREENING; LDL GOAL LESS THAN 130 10/31/2010     Priority: Medium     LVH (left ventricular hypertrophy) 10/20/2010     Priority: Medium     December 14, 2012 seen on echo, 2010. On ace inhibitor, not  beta blocker given asthma. Will repeat echo in 1 year.        Moderate persistent asthma 06/18/2008     Priority: Medium     April 7, 2014   well controlled on Advair, using albuterol inhaler rarely. One year refill. Note: other control medications, such as Symbicort, were not as effective.       Erectile dysfuntion 06/18/2008     Priority: Medium     April 4, 2014  using Levitra. No contraindications.       GERD (gastroesophageal reflux disease) 06/18/2008     Priority: Medium     April 4, 2014  Doing well on chronic PPI  therapy.         Past Medical History:   Diagnosis Date     Allergic urticaria 1/15/08     Hypertension goal BP (blood pressure) < 130/80 4/21/2011     Obesity, unspecified 1/15/08     Uncontrolled type 2 diabetes mellitus without complication, without long-term current use of insulin 1/7/2017     Past Surgical History:   Procedure Laterality Date     COLONOSCOPY  5/26/2011    Procedure:COMBINED COLONOSCOPY, REMOVE TUMOR/POLYP/LESION BY SNARE; Surgeon:BRIANNA TAPIA; Location:[facility]     Current Outpatient Medications   Medication Sig Dispense Refill     albuterol (PROAIR HFA/PROVENTIL HFA/VENTOLIN HFA) 108 (90 Base) MCG/ACT inhaler INHALE 2 PUFFS INTO THE LUNGS EVERY 6 HOURS AS NEEDED FOR SHORTNESS OF BREATH/DYSPNEA OR WHEEZING 8.5 Inhaler 1     amLODIPine (NORVASC) 10 MG tablet TAKE 1 TABLET BY MOUTH EVERY DAY 90 tablet 1     Ascorbic Acid (VITAMIN C CR PO) Take 1,000 mg by mouth daily.       aspirin 81 MG chewable tablet Take 81 mg by mouth daily.       blood glucose (NO BRAND SPECIFIED) test strip Use to test blood sugar 1-3 times daily or as directed. 300 strip 1     blood glucose monitoring (NO BRAND SPECIFIED) meter device kit Use to test blood sugar 1-3 times daily or as directed. 1 kit 0     cetirizine (ZYRTEC) 10 MG tablet Take 10 mg by mouth daily.       fluticasone-salmeterol (ADVAIR DISKUS) 250-50 MCG/DOSE inhaler INHALE 1 PUFF INTO THE LUNGS EVERY 12 HOURS 180 each 3     HYDROmorphone (DILAUDID) 4 MG tablet Take 1 tablet (4 mg) by mouth every 6 hours as needed for severe pain 20 tablet 0     KLOR-CON 20 MEQ CR tablet TAKE 1 TABLET BY MOUTH DAILY 90 tablet 3     losartan (COZAAR) 100 MG tablet TAKE 1 TABLET BY MOUTH EVERY DAY 90 tablet 1     Melatonin 10 MG TABS tablet Take 20 mg by mouth nightly as needed for sleep       metFORMIN (GLUCOPHAGE-XR) 500 MG 24 hr tablet Take 2 tablets (1,000 mg) by mouth 2 times daily (with meals) 360 tablet 1     metoprolol succinate ER (TOPROL-XL) 50 MG 24 hr tablet  "Take 1 tablet (50 mg) by mouth daily For blood pressure. 90 tablet 1     MULTI-VITAMIN OR Once daily       Omega-3 Fatty Acids (FISH OIL PO) Take  by mouth 2 times daily.       omeprazole (PRILOSEC) 20 MG capsule Take 20 mg by mouth daily.       simvastatin (ZOCOR) 20 MG tablet +++NEED APPT+++TAKE 1 TABLET BY MOUTH EVERYDAY AT BEDTIME 90 tablet 0     tamsulosin (FLOMAX) 0.4 MG capsule Take 1 capsule (0.4 mg) by mouth At Bedtime 90 capsule 3     Cholecalciferol (VITAMIN D) 1000 UNIT capsule Take 1 capsule by mouth daily. (Patient not taking: Reported on 12/1/2021)       tiZANidine (ZANAFLEX) 4 MG tablet Take 1 tablet (4 mg) by mouth 3 times daily as needed for muscle spasms (Patient not taking: Reported on 12/1/2021) 30 tablet 1       Allergies   Allergen Reactions     Cats      Grass      Hazelnut (Filbert) Allergy Skin Test         Social History     Tobacco Use     Smoking status: Never Smoker     Smokeless tobacco: Never Used   Substance Use Topics     Alcohol use: Yes     Comment: rare       History   Drug Use No         Objective     /86   Pulse 80   Temp 97  F (36.1  C) (Tympanic)   Resp 20   Ht 1.905 m (6' 3\")   Wt 149.7 kg (330 lb)   SpO2 96%   BMI 41.25 kg/m      Physical Exam  GENERAL: Healthy, alert and no distress  EYES: Eyes grossly normal to inspection, conjunctivae and sclerae normal  RESP: Lungs clear to auscultation - no rales, rhonchi or wheezes  CV: Regular rate and rhythm, normal S1 S2, no murmur  MS: No gross musculoskeletal defects noted, no edema  NEURO: Normal strength and tone, mentation intact and speech normal  PSYCH: Mentation appears normal, affect normal/bright     Recent Labs   Lab Test 09/10/21  0911 08/19/20  1001   HGB 14.6  --      --     137   POTASSIUM 3.3* 3.3*   CR 1.01 0.97   A1C  --  6.2*        Diagnostics:  Recent Results (from the past 24 hour(s))   HEMOGLOBIN A1C    Collection Time: 12/01/21  4:07 PM   Result Value Ref Range    Hemoglobin A1C 6.0 " (H) 0.0 - 5.6 %   Lipid panel reflex to direct LDL Non-fasting    Collection Time: 12/01/21  4:07 PM   Result Value Ref Range    Cholesterol 118 <200 mg/dL    Triglycerides 146 <150 mg/dL    Direct Measure HDL 35 (L) >=40 mg/dL    LDL Cholesterol Calculated 54 <=100 mg/dL    Non HDL Cholesterol 83 <130 mg/dL    Patient Fasting > 8hrs? Yes    Basic metabolic panel  (Ca, Cl, CO2, Creat, Gluc, K, Na, BUN)    Collection Time: 12/01/21  4:07 PM   Result Value Ref Range    Sodium 139 133 - 144 mmol/L    Potassium 3.4 3.4 - 5.3 mmol/L    Chloride 103 94 - 109 mmol/L    Carbon Dioxide (CO2) 31 20 - 32 mmol/L    Anion Gap 5 3 - 14 mmol/L    Urea Nitrogen 20 7 - 30 mg/dL    Creatinine 1.00 0.66 - 1.25 mg/dL    Calcium 9.0 8.5 - 10.1 mg/dL    Glucose 111 (H) 70 - 99 mg/dL    GFR Estimate 80 >60 mL/min/1.73m2      No EKG required, no history of coronary heart disease, significant arrhythmia, peripheral arterial disease or other structural heart disease.    Revised Cardiac Risk Index (RCRI):  The patient has the following serious cardiovascular risks for perioperative complications:   - No serious cardiac risks = 0 points     RCRI Interpretation: 0 points: Class I (very low risk - 0.4% complication rate)           Signed Electronically by: Paty Badillo MD  Copy of this evaluation report is provided to requesting physician.

## 2021-12-01 NOTE — PATIENT INSTRUCTIONS
Preparing for Your Surgery  Getting started  A nurse will call you to review your health history and instructions. They will give you an arrival time based on your scheduled surgery time.  Please be ready to share the following:    Your doctor's clinic name and phone number    Your medical, surgical and anesthesia history    A list of allergies and sensitivities    A list of medicines, including herbal treatments and over-the-counter drugs    Whether the patient has a legal guardian (ask how to send us the papers in advance)  If you have a child who's having surgery, please ask for a copy of Preparing for Your Child's Surgery.    Preparing for surgery    Within 30 days of surgery: Have a pre-op exam (sometimes called an H&P, or History and Physical). This can be done at a clinic or pre-operative center.  ? If you're having a , you may not need this exam. Talk to your care team    At your pre-op exam, talk to your care team about all medicines you take. If you need to stop any medicines before surgery, ask when to start taking them again.  ? We do this for your safety. Many medicines can make you bleed too much during surgery. Some change how well surgery (anesthesia) drugs work.    Call your insurance company to let them know you're having surgery. (If you don't have insurance, call [phone].)    Call your clinic if there's any change in your health. This includes signs of a cold or flu (sore throat, runny nose, cough, rash, fever). It also includes a scrape or scratch near the surgery site.    If you have questions on the day of surgery, call your hospital or surgery center.  Eating and drinking guidelines  For your safety: Unless your surgeon tells you otherwise, follow the guidelines below.    Eat and drink as usual until 8 hours before surgery. After that, no food or milk.    Drink clear liquids until 2 hours before surgery. These are liquids you can see through, like water, Gatorade and Propel  Water. You may also have black coffee and tea (no cream or milk).    Nothing by mouth within 2 hours of surgery. This includes gum, candy and breath mints.    If you drink, stop drinking alcohol the night before surgery.    If your care team tells you to take medicine on the morning of surgery, it's okay to take it with a sip of water.  Preventing infection    Shower or bathe the night before and morning of your surgery. Follow the instructions your clinic gave you. (If no instructions, use regular soap.)    Don't shave or clip hair near your surgery site. We'll remove the hair if needed.    Don't smoke or vape the morning of surgery. You may chew nicotine gum up to 2 hours before surgery. A nicotine patch is okay.  ? Note: Some surgeries require you to completely quit smoking and nicotine. Check with your surgeon.    Your care team will make every effort to keep you safe from infection. We will:  ? Clean our hands often with soap and water (or an alcohol-based hand rub).  ? Clean the skin at your surgery site with a special soap that kills germs.  ? Give you a special gown to keep you warm. (Cold raises the risk of infection.)  ? Wear special hair covers, masks, gowns and gloves during surgery.  ? Give antibiotic medicine, if prescribed. Not all surgeries need antibiotics.  What to bring on the day of surgery    Photo ID and insurance card    Copy of your health care directive, if you have one    Glasses and hearing aides (bring cases)  ? You can't wear contacts during surgery    Inhaler and eye drops, if you use them (tell us about these when you arrive)    CPAP machine or breathing device, if you use them    A few personal items, if spending the night    If you have . . .  ? A pacemaker or ICD (cardiac defibrillator): Bring the ID card.  ? An implanted stimulator: Bring the remote control.  ? A legal guardian: Bring a copy of the certified (court-stamped) guardianship papers.  Please remove any jewelry, including  body piercings. Leave jewelry and other valuables at home.  If you're going home the day of surgery  Important: If you don't follow the rules below, we must cancel your surgery.     Arrange for someone to drive you home after surgery. You may not drive, take a taxi or take public transportation by yourself (unless you'll have local anesthesia only).    Arrange for a responsible adult to stay with you overnight. If you don't, we may keep you in the hospital overnight, and you may need to pay the costs yourself.  Questions?   If you have any questions for your care team, list them here: _________________________________________________________________________________________________________________________________________________________________________________________________________________________________________________________________________________________________________________________  For informational purposes only. Not to replace the advice of your health care provider. Copyright   2003, 2019 [employer]. All rights reserved. Clinically reviewed by Malathi Ansari MD. [employer] 300728 - REV 4/20.    Before Your Procedure or Hospital Admission  Testing for COVID-19 (Coronavirus)  Thank you for choosing [facility] for your health care needs. The COVID-19 pandemic is a very challenging time for everyone.   Our goal is to keep you and our team here at [facility] safe and healthy. We've taken many steps to make this happen. For example:    We test and screen our staff, care teams and patients for COVID-19.    Everyone at [facility] must wear a mask and stay 6 feet apart.    We are limiting hospital and clinic visitors.  Before you come in  All patients must get tested for COVID-19. Your test needs to happen 2 to 4 days before you check in to the hospital or surgery site.   A clinic scheduler will call about a week in advance to set up a testing time at one of our labs. We'll take  "a swab of your nose or throat.  Note: If you go to a clinic or pharmacy like [email or web address] or [email or web address] for your test, make sure you get a test inside the nose. Tests inside the nose are:    A naso-pharyngeal (NP) RT-PCR test    An anterior nares RT-PCR test  Do NOT get a \"rapid\" test or a saliva (spit) test.  After the test, please stay at home and stay out of contact with other people. It will be harder for you to recover if you get COVID-19 before your treatment.  Please follow all current safety guidelines, including:    Limit trips outside your home.    Limit the number of people you see.    Always wear a mask outside your home.    Use social distancing. Stay 6 feet away from others whenever you can.    Wash your hands often.  If your test shows you have COVID-19  If your test is positive, we'll let you know. A positive test means that you have the virus.   We'll probably have to postpone your admission, surgery or procedure. Your doctor will discuss this with you. After that, we'll let you know what to do and when you can re-schedule.   We may need to cancel your treatment on short notice for other reasons, too.  If your test shows you DON'T have COVID-19  Even if your test is negative, you can still get COVID-19. It's rare but, sometimes, the test result is wrong. You could also catch the virus after taking the test.   There's a very small chance that you could catch COVID-19 in the hospital or surgery center. [facility] has taken many steps to prevent this from happening.   Day of your surgery or procedure    Please come wearing a face covering that covers both your nose and mouth.    When you arrive, we'll ask you some questions to find out if you have any signs or symptoms of COVID-19.    Ask your care team if you can have visitors. All visitors must wear face coverings and will be screened for signs of COVID-19.  ? Even if no visitors are allowed, you can still have with you:    Your legal guardian or legal " "decision maker    A parent and one other visitor, if you are younger than 18 years old    A partner and a , if you are in labor  ? We might need to teach you about taking care of yourself after surgery. If so, a visitor can come into the hospital to learn about it, too.  ? The rules for visitors change often, depending on how much the virus is spreading. To learn more, see Visiting a Loved One in the Hospital during the COVID-19 Outbreak.  Please call your care team, hospital or surgery center if you have any questions. We thank you for your understanding and for choosing [facility] for your care.   Questions and answers  Does it matter where I get tested for COVID-19?  Yes. We urge you to get tested at one of our [facility] COVID-19 testing sites. We process these tests in our lab and can get the results quickly. Your [facility] care team needs to get your results before you check in.  What should I do if I can't get tested at [facility]?  You can get tested somewhere else, but you'll need to take these extra steps:   1. Contact your family doctor or clinic to arrange your test.  2. Take the test within 4 days of your surgery or procedure. We can't accept tests older than 4 days.  3. Make sure you're getting a test inside the nose. Tests inside the nose are:  ? A naso-pharyngeal (NP) RT-PCR test  ? An anterior nares RT-PCR test  Many pharmacies use \"rapid\" tests or saliva (spit) tests. We do NOT accept those tests before surgery or procedures. Tests from inside the nose are the most accurate tests.  1. Make sure your doctor or clinic faxes your results to [facility] at [phone].  If we don't get your results in time, we may have to delay or cancel your treatment.  For informational purposes only. Not to replace the advice of your health care provider. Copyright   2020 [facility]. All rights reserved. Clinically reviewed by Infection Prevention and the " [facility] COVID-19 Clinical Team. [employer] 734114 - Rev 09/09/21.

## 2021-12-02 ASSESSMENT — ASTHMA QUESTIONNAIRES: ACT_TOTALSCORE: 20

## 2021-12-04 DIAGNOSIS — E11.9 CONTROLLED TYPE 2 DIABETES MELLITUS WITHOUT COMPLICATION, WITHOUT LONG-TERM CURRENT USE OF INSULIN (H): ICD-10-CM

## 2021-12-07 RX ORDER — METFORMIN HCL 500 MG
TABLET, EXTENDED RELEASE 24 HR ORAL
Qty: 360 TABLET | Refills: 1 | Status: SHIPPED | OUTPATIENT
Start: 2021-12-07 | End: 2022-06-18

## 2021-12-13 ENCOUNTER — LAB (OUTPATIENT)
Dept: LAB | Facility: CLINIC | Age: 61
End: 2021-12-13
Payer: OTHER GOVERNMENT

## 2021-12-13 DIAGNOSIS — Z11.59 ENCOUNTER FOR SCREENING FOR OTHER VIRAL DISEASES: ICD-10-CM

## 2021-12-13 DIAGNOSIS — J45.40 MODERATE PERSISTENT ASTHMA WITHOUT COMPLICATION: ICD-10-CM

## 2021-12-13 DIAGNOSIS — N20.0 KIDNEY STONE: ICD-10-CM

## 2021-12-13 PROCEDURE — U0003 INFECTIOUS AGENT DETECTION BY NUCLEIC ACID (DNA OR RNA); SEVERE ACUTE RESPIRATORY SYNDROME CORONAVIRUS 2 (SARS-COV-2) (CORONAVIRUS DISEASE [COVID-19]), AMPLIFIED PROBE TECHNIQUE, MAKING USE OF HIGH THROUGHPUT TECHNOLOGIES AS DESCRIBED BY CMS-2020-01-R: HCPCS

## 2021-12-13 PROCEDURE — U0005 INFEC AGEN DETEC AMPLI PROBE: HCPCS

## 2021-12-13 NOTE — TELEPHONE ENCOUNTER
Reason for Call:  Medication or medication refill:    Do you use a [facility]?  Name of the pharmacy and phone number for the current request:  [facility] [address] [phone]      Name of the medication requested: fluticasone-salmeterol (ADVAIR DISKUS) 250-50 MCG/DOSE inhaler/HYDROmorphone (DILAUDID) 4 MG tablet    Other request:  On Advair patient needs 90 day supply also patient needs on Thursday 12-16-21 having surgery      Can we leave a detailed message on this number? YES    Phone number patient can be reached at: Home number on file [phone] (home)    Best Time:  Anytime     Call taken on 12/13/2021 at 2:43 PM by Sonya Pitts

## 2021-12-13 NOTE — TELEPHONE ENCOUNTER
Routing refill request to provider for review/approval because:  Drug not on the [facility] refill protocol     HYDROmorphone (DILAUDID) 4 MG tablet  Last Written Prescription Date:  11/22/21  Last Fill Quantity: 20 tablets refills: 0     Last office visit: 12/1/2021 with prescribing provider:  Dr. Badillo     Future Office Visit:   Next 5 appointments (look out 90 days)    Jan 03, 2022 10:00 AM  Return Visit with Jatinder Rolle MD  [facility] ([facility] - [address]  [place] [address]  [phone]       Patient states that he has only 2 pills left and wants to be sure to have on hand after his surgery on Thursday, December 16.     Felecia Cary RN BSN  [facility]

## 2021-12-13 NOTE — TELEPHONE ENCOUNTER
I spoke with Monika at the [facility] [address] [place] [address]. She said insurance allowed her to  fill the fluticasone-salmeterol (ADVAIR DISKUS) 250-50 MCG/DOSE inhaler for 60 grams (1 inhaler) for $18. She is filling for patient today.     If patient wants a 90 day supply, he would need to use the [facility] Mail [facility] pharmacy.     I informed patient of the above. He verbalized a good understanding. He plans to  the 1 inhaler now and requested the remaining refills be sent to the [facility] Mail order service.     New prescription for 90 day supply (180 each) with 3 refills e-prescribed to: [facility] - [place], [place] - [address] AT PORTAL TO REGISTERED [facility] SITES.    Felecia Cary RN BSN  [facility]

## 2021-12-14 ENCOUNTER — TELEPHONE (OUTPATIENT)
Dept: FAMILY MEDICINE | Facility: CLINIC | Age: 61
End: 2021-12-14

## 2021-12-14 LAB — SARS-COV-2 RNA RESP QL NAA+PROBE: NEGATIVE

## 2021-12-14 RX ORDER — HYDROMORPHONE HYDROCHLORIDE 4 MG/1
4 TABLET ORAL EVERY 6 HOURS PRN
Qty: 20 TABLET | Refills: 0 | Status: SHIPPED | OUTPATIENT
Start: 2021-12-14 | End: 2024-03-22

## 2021-12-14 NOTE — TELEPHONE ENCOUNTER
Rx for dilaudid sent in. Thanks for getting the Advair Rx's done. Please update patient. Thank you.

## 2021-12-14 NOTE — TELEPHONE ENCOUNTER
Prior Authorization Retail Medication Request    Medication/Dose: HYDROmorphone (DILAUDID) 4 MG tablet  ICD code (if different than what is on RX):  N20.0  Previously Tried and Failed:  na  Rationale:  na    Insurance Name:  COMMERCIAL  Insurance ID:  [id number]      Pharmacy Information (if different than what is on RX)  Name:  [facility]  Phone:  [phone]

## 2021-12-15 ENCOUNTER — ANESTHESIA EVENT (OUTPATIENT)
Dept: SURGERY | Facility: AMBULATORY SURGERY CENTER | Age: 61
End: 2021-12-15
Payer: COMMERCIAL

## 2021-12-15 NOTE — TELEPHONE ENCOUNTER
PRIOR AUTHORIZATION DENIED    Medication: HYDROmorphone (DILAUDID) 4 MG tablet- DENIED    Denial Date: 12/15/2021    Denial Rational: CRITERIA NOT MET            Appeal Information:             Central Prior Authorization Team  Phone: [phone]

## 2021-12-15 NOTE — TELEPHONE ENCOUNTER
PA Initiation    Medication: HYDROmorphone (DILAUDID) 4 MG tablet- INITIATED  Insurance Company: [employer] - Phone [phone] Fax [phone]  Pharmacy Filling the [facility] [id number] [place], [place] - [address] [place] [address]  Filling Pharmacy Phone: [phone]  Filling Pharmacy Fax: [phone]  Start Date: 12/15/2021

## 2021-12-16 ENCOUNTER — HOSPITAL ENCOUNTER (OUTPATIENT)
Facility: AMBULATORY SURGERY CENTER | Age: 61
End: 2021-12-16
Attending: UROLOGY | Admitting: UROLOGY
Payer: COMMERCIAL

## 2021-12-16 ENCOUNTER — SURGERY (OUTPATIENT)
Age: 61
End: 2021-12-16

## 2021-12-16 ENCOUNTER — ANESTHESIA (OUTPATIENT)
Dept: SURGERY | Facility: AMBULATORY SURGERY CENTER | Age: 61
End: 2021-12-16
Payer: COMMERCIAL

## 2021-12-16 VITALS
DIASTOLIC BLOOD PRESSURE: 81 MMHG | OXYGEN SATURATION: 96 % | TEMPERATURE: 97.9 F | HEART RATE: 79 BPM | BODY MASS INDEX: 41.25 KG/M2 | SYSTOLIC BLOOD PRESSURE: 138 MMHG | RESPIRATION RATE: 16 BRPM | WEIGHT: 315 LBS

## 2021-12-16 DIAGNOSIS — N20.0 KIDNEY STONE: ICD-10-CM

## 2021-12-16 LAB — GLUCOSE BLDC GLUCOMTR-MCNC: 133 MG/DL (ref 70–99)

## 2021-12-16 PROCEDURE — 52332 CYSTOSCOPY AND TREATMENT: CPT | Mod: RT

## 2021-12-16 PROCEDURE — 82962 GLUCOSE BLOOD TEST: CPT | Performed by: UROLOGY

## 2021-12-16 PROCEDURE — 52332 CYSTOSCOPY AND TREATMENT: CPT | Mod: RT | Performed by: UROLOGY

## 2021-12-16 PROCEDURE — G8907 PT DOC NO EVENTS ON DISCHARG: HCPCS

## 2021-12-16 PROCEDURE — 50590 FRAGMENTING OF KIDNEY STONE: CPT | Mod: RT

## 2021-12-16 PROCEDURE — 50590 FRAGMENTING OF KIDNEY STONE: CPT | Mod: RT | Performed by: UROLOGY

## 2021-12-16 PROCEDURE — G8916 PT W IV AB GIVEN ON TIME: HCPCS

## 2021-12-16 DEVICE — IMPLANTABLE DEVICE: Type: IMPLANTABLE DEVICE | Site: URETER | Status: FUNCTIONAL

## 2021-12-16 RX ORDER — CEPHALEXIN 500 MG/1
500 CAPSULE ORAL 3 TIMES DAILY
Qty: 9 CAPSULE | Refills: 0 | Status: SHIPPED | OUTPATIENT
Start: 2021-12-16 | End: 2021-12-19

## 2021-12-16 RX ORDER — ACETAMINOPHEN 325 MG/1
975 TABLET ORAL ONCE
Status: COMPLETED | OUTPATIENT
Start: 2021-12-16 | End: 2021-12-16

## 2021-12-16 RX ORDER — FENTANYL CITRATE 50 UG/ML
25 INJECTION, SOLUTION INTRAMUSCULAR; INTRAVENOUS
Status: DISCONTINUED | OUTPATIENT
Start: 2021-12-16 | End: 2021-12-17 | Stop reason: HOSPADM

## 2021-12-16 RX ORDER — SODIUM CHLORIDE, SODIUM LACTATE, POTASSIUM CHLORIDE, CALCIUM CHLORIDE 600; 310; 30; 20 MG/100ML; MG/100ML; MG/100ML; MG/100ML
INJECTION, SOLUTION INTRAVENOUS CONTINUOUS
Status: DISCONTINUED | OUTPATIENT
Start: 2021-12-16 | End: 2021-12-17 | Stop reason: HOSPADM

## 2021-12-16 RX ORDER — HYDROCODONE BITARTRATE AND ACETAMINOPHEN 5; 325 MG/1; MG/1
1-2 TABLET ORAL EVERY 4 HOURS PRN
Qty: 10 TABLET | Refills: 0 | Status: SHIPPED | OUTPATIENT
Start: 2021-12-16 | End: 2024-03-22

## 2021-12-16 RX ORDER — PROPOFOL 10 MG/ML
INJECTION, EMULSION INTRAVENOUS PRN
Status: DISCONTINUED | OUTPATIENT
Start: 2021-12-16 | End: 2021-12-16

## 2021-12-16 RX ORDER — FENTANYL CITRATE 50 UG/ML
INJECTION, SOLUTION INTRAMUSCULAR; INTRAVENOUS PRN
Status: DISCONTINUED | OUTPATIENT
Start: 2021-12-16 | End: 2021-12-16

## 2021-12-16 RX ORDER — ONDANSETRON 4 MG/1
4 TABLET, ORALLY DISINTEGRATING ORAL EVERY 30 MIN PRN
Status: DISCONTINUED | OUTPATIENT
Start: 2021-12-16 | End: 2021-12-17 | Stop reason: HOSPADM

## 2021-12-16 RX ORDER — LIDOCAINE HYDROCHLORIDE 20 MG/ML
INJECTION, SOLUTION INFILTRATION; PERINEURAL PRN
Status: DISCONTINUED | OUTPATIENT
Start: 2021-12-16 | End: 2021-12-16

## 2021-12-16 RX ORDER — FENTANYL CITRATE 50 UG/ML
25 INJECTION, SOLUTION INTRAMUSCULAR; INTRAVENOUS EVERY 5 MIN PRN
Status: DISCONTINUED | OUTPATIENT
Start: 2021-12-16 | End: 2021-12-17 | Stop reason: HOSPADM

## 2021-12-16 RX ORDER — ONDANSETRON 2 MG/ML
4 INJECTION INTRAMUSCULAR; INTRAVENOUS EVERY 30 MIN PRN
Status: DISCONTINUED | OUTPATIENT
Start: 2021-12-16 | End: 2021-12-17 | Stop reason: HOSPADM

## 2021-12-16 RX ORDER — OXYCODONE HYDROCHLORIDE 5 MG/1
5 TABLET ORAL EVERY 4 HOURS PRN
Status: DISCONTINUED | OUTPATIENT
Start: 2021-12-16 | End: 2021-12-17 | Stop reason: HOSPADM

## 2021-12-16 RX ORDER — CEFAZOLIN SODIUM IN 0.9 % NACL 3 G/100 ML
3 INTRAVENOUS SOLUTION, PIGGYBACK (ML) INTRAVENOUS
Status: COMPLETED | OUTPATIENT
Start: 2021-12-16 | End: 2021-12-16

## 2021-12-16 RX ORDER — CEFAZOLIN SODIUM IN 0.9 % NACL 3 G/100 ML
3 INTRAVENOUS SOLUTION, PIGGYBACK (ML) INTRAVENOUS SEE ADMIN INSTRUCTIONS
Status: DISCONTINUED | OUTPATIENT
Start: 2021-12-16 | End: 2021-12-17 | Stop reason: HOSPADM

## 2021-12-16 RX ORDER — MEPERIDINE HYDROCHLORIDE 25 MG/ML
12.5 INJECTION INTRAMUSCULAR; INTRAVENOUS; SUBCUTANEOUS
Status: DISCONTINUED | OUTPATIENT
Start: 2021-12-16 | End: 2021-12-17 | Stop reason: HOSPADM

## 2021-12-16 RX ORDER — METOPROLOL TARTRATE 1 MG/ML
1-2 INJECTION, SOLUTION INTRAVENOUS EVERY 5 MIN PRN
Status: DISCONTINUED | OUTPATIENT
Start: 2021-12-16 | End: 2021-12-17 | Stop reason: HOSPADM

## 2021-12-16 RX ORDER — LIDOCAINE 40 MG/G
CREAM TOPICAL
Status: DISCONTINUED | OUTPATIENT
Start: 2021-12-16 | End: 2021-12-17 | Stop reason: HOSPADM

## 2021-12-16 RX ADMIN — SODIUM CHLORIDE, SODIUM LACTATE, POTASSIUM CHLORIDE, CALCIUM CHLORIDE: 600; 310; 30; 20 INJECTION, SOLUTION INTRAVENOUS at 12:01

## 2021-12-16 RX ADMIN — PROPOFOL 200 MG: 10 INJECTION, EMULSION INTRAVENOUS at 12:04

## 2021-12-16 RX ADMIN — FENTANYL CITRATE 50 MCG: 50 INJECTION, SOLUTION INTRAMUSCULAR; INTRAVENOUS at 12:04

## 2021-12-16 RX ADMIN — ONDANSETRON 4 MG: 2 INJECTION INTRAMUSCULAR; INTRAVENOUS at 13:00

## 2021-12-16 RX ADMIN — LIDOCAINE HYDROCHLORIDE 60 MG: 20 INJECTION, SOLUTION INFILTRATION; PERINEURAL at 12:04

## 2021-12-16 RX ADMIN — Medication 3 G: at 12:01

## 2021-12-16 RX ADMIN — ACETAMINOPHEN 975 MG: 325 TABLET ORAL at 10:51

## 2021-12-16 NOTE — DISCHARGE INSTRUCTIONS
[facility]   Adult Discharge Orders & Instructions     For 24 hours after surgery    1. Get plenty of rest.  A responsible adult must stay with you for at least 24 hours after you leave the hospital.   2. Do not drive or use heavy equipment.  If you have weakness or tingling, don't drive or use heavy equipment until this feeling goes away.  3. Do not drink alcohol.  4. Avoid strenuous or risky activities.  Ask for help when climbing stairs.   5. You may feel lightheaded.  IF so, sit for a few minutes before standing.  Have someone help you get up.   6. If you have nausea (feel sick to your stomach): Drink only clear liquids such as apple juice, ginger ale, broth or 7-Up.  Rest may also help.  Be sure to drink enough fluids.  Move to a regular diet as you feel able.  7. You may have a slight fever. Call the doctor if your fever is over 100 F (37.7 C) (taken under the tongue) or lasts longer than 24 hours.  8. You may have a dry mouth, a sore throat, muscle aches or trouble sleeping.  These should go away after 24 hours.  9. Do not make important or legal decisions.     Call your doctor for any of the followin.  Signs of infection (fever, growing tenderness at the surgery site, a large amount of drainage or bleeding, severe pain, foul-smelling drainage, redness, swelling).    2. It has been over 8 to 10 hours since surgery and you are still not able to urinate (pass water).    3.  Headache for over 24 hours.    4.  Numbness, tingling or weakness the day after surgery (if you had spinal anesthesia).                  5. Signs of Covid-19 infection (temperature over 100 degrees, shortness of breath, cough, loss of taste/smell, generalized body aches, persistent headache,                  chills, sore throat, nausea/vomiting/diarrhea).  To contact Dr Rolle call:  [phone]

## 2021-12-16 NOTE — ANESTHESIA PROCEDURE NOTES
Airway       Patient location during procedure: OR  Staff -        Performed By: CRNAIndications and Patient Condition       Indications for airway management: andrew-procedural       Induction type:intravenous       Mask difficulty assessment: 1 - vent by mask    Final Airway Details       Final airway type: supraglottic airway    Supraglottic Airway Details        Type: LMA       LMA size: 5    Post intubation assessment        Placement verified by: capnometry, equal breath sounds and chest rise        Number of attempts at approach: 1       Number of other approaches attempted: 0       Secured with: cloth tape       Ease of procedure: easy       Dentition: Intact

## 2021-12-16 NOTE — ANESTHESIA PREPROCEDURE EVALUATION
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/08     Hypertension goal BP (blood pressure) < 130/80 2011     Obesity, unspecified 1/15/08     Uncontrolled type 2 diabetes mellitus without complication, without long-term current use of insulin 2017      Past Surgical History:   Procedure Laterality Date     COLONOSCOPY  2011    Procedure:COMBINED COLONOSCOPY, REMOVE TUMOR/POLYP/LESION BY SNARE; Surgeon:BRIANNA TAPIA; Location:[facility]      Allergies   Allergen Reactions     Cats      Grass      Hazelnut (Filbert) Allergy Skin Test       Social History     Tobacco Use     Smoking status: Never Smoker     Smokeless tobacco: Never Used   Substance Use Topics     Alcohol use: Yes     Comment: rare      Wt Readings from Last 1 Encounters:   21 149.7 kg (330 lb)        Anesthesia Evaluation            ROS/MED HX  ENT/Pulmonary:     (+) asthma     Neurologic:       Cardiovascular:     (+) Dyslipidemia hypertension-----    METS/Exercise Tolerance:     Hematologic:       Musculoskeletal:       GI/Hepatic:     (+) GERD,     Renal/Genitourinary:     (+) Nephrolithiasis ,     Endo:     (+) type II DM, Obesity,     Psychiatric/Substance Use:       Infectious Disease:       Malignancy:       Other:            Physical Exam    Airway  airway exam normal      Mallampati: II   TM distance: > 3 FB   Neck ROM: full   Mouth opening: > 3 cm    Respiratory Devices and Support         Dental  no notable dental history         Cardiovascular          Rhythm and rate: regular and normal     Pulmonary   pulmonary exam normal        breath sounds clear to auscultation           OUTSIDE LABS:  CBC:   Lab Results    Component Value Date    WBC 6.2 09/10/2021    WBC 6.4 11/13/2019    HGB 14.6 09/10/2021    HGB 15.1 11/13/2019    HCT 42.3 09/10/2021    HCT 43.9 11/13/2019     09/10/2021     11/13/2019     BMP:   Lab Results   Component Value Date     12/01/2021     09/10/2021    POTASSIUM 3.4 12/01/2021    POTASSIUM 3.3 (L) 09/10/2021    CHLORIDE 103 12/01/2021    CHLORIDE 103 09/10/2021    CO2 31 12/01/2021    CO2 33 (H) 09/10/2021    BUN 20 12/01/2021    BUN 16 09/10/2021    CR 1.00 12/01/2021    CR 1.01 09/10/2021     (H) 12/01/2021     (H) 09/10/2021     COAGS: No results found for: PTT, INR, FIBR  POC:   Lab Results   Component Value Date     (H) 10/21/2010     HEPATIC:   Lab Results   Component Value Date    ALBUMIN 4.6 12/21/2009    PROTTOTAL 7.6 12/21/2009    ALT 24 12/21/2009    AST 29 12/21/2009    ALKPHOS 86 12/21/2009    BILITOTAL 0.6 12/21/2009     OTHER:   Lab Results   Component Value Date    PH 7.40 10/19/2010    A1C 6.0 (H) 12/01/2021    JOEY 9.0 12/01/2021    TSH 1.14 05/24/2018       Anesthesia Plan    ASA Status:  3   NPO Status:  NPO Appropriate    Anesthesia Type: General.     - Airway: LMA   Induction: Intravenous.   Maintenance: Balanced.        Consents    Anesthesia Plan(s) and associated risks, benefits, and realistic alternatives discussed. Questions answered and patient/representative(s) expressed understanding.    - Discussed:     - Discussed with:  Patient      - Extended Intubation/Ventilatory Support Discussed: No.      - Patient is DNR/DNI Status: No    Use of blood products discussed: No .     Postoperative Care    Pain management: IV analgesics, Oral pain medications, Multi-modal analgesia.   PONV prophylaxis: Ondansetron (or other 5HT-3), Background Propofol Infusion, Dexamethasone or Solumedrol     Comments:                Memo Ritchie MD

## 2021-12-16 NOTE — ANESTHESIA CARE TRANSFER NOTE
Patient: Bi Bishop    Procedure: Procedure(s):  LITHOTRIPSY,RIGHT  EXTRACORPOREAL SHOCK WAVE (ESWL), WITH  CYSTOSCOPY AND URETERAL STENT INSERTION       Diagnosis: Kidney stone [N20.0]  Diagnosis Additional Information: No value filed.    Anesthesia Type:   General     Note:    Oropharynx: oropharynx clear of all foreign objects and nasal airway in place  Level of Consciousness: awake  Oxygen Supplementation: face mask    Independent Airway: airway patency satisfactory and stable  Dentition: dentition unchanged  Vital Signs Stable: post-procedure vital signs reviewed and stable  Report to RN Given: handoff report given  Patient transferred to: PACU    Handoff Report: Identifed the Patient, Identified the Reponsible Provider, Reviewed the pertinent medical history, Discussed the surgical course, Reviewed Intra-OP anesthesia mangement and issues during anesthesia, Set expectations for post-procedure period and Allowed opportunity for questions and acknowledgement of understanding      Vitals:  Vitals Value Taken Time   /80 12/16/21 1307   Temp 97.9  F (36.6  C) 12/16/21 1307   Pulse 83 12/16/21 1309   Resp 17 12/16/21 1309   SpO2 94 % 12/16/21 1309   Vitals shown include unvalidated device data.    Electronically Signed By: LIANNE Grigsby CRNA  December 16, 2021  1:09 PM

## 2021-12-16 NOTE — OP NOTE
PREOPERATIVE DIAGNOSIS:  right renal stone      POSTOPERATIVE DIAGNOSIS:  same      PROCEDURE:  cysto and right stent placement / extracorporeal shockwave lithotripsy.       DESCRIPTION OF PROCEDURE:  Patient was brought to the operating room and after general anesthesia was induced.  General anesthesia was placed supine.  Preop antibiotic was given.  he was draped and prepped.  A flexible cystoscope was then placed into the bladder.  The right ureteral orifice identified.  A sensor wire was then placed into renal pelvis followed by placement of 6 F x 30 cm stent.  Under fluoroscopy, the right renal stone was identified.  A total of 2500 shocks was given to the stone.  There are some changes to the stone, but it is still visualized at the end of the procedure.  The patient tolerated the procedure well.  No complication identified during the procedure.           YAMEL HUYNH MD     in [employer]:

## 2021-12-16 NOTE — ANESTHESIA POSTPROCEDURE EVALUATION
Patient: Bi Bishop    Procedure: Procedure(s):  LITHOTRIPSY,RIGHT  EXTRACORPOREAL SHOCK WAVE (ESWL), WITH  CYSTOSCOPY AND URETERAL STENT INSERTION       Diagnosis:Kidney stone [N20.0]  Diagnosis Additional Information: No value filed.    Anesthesia Type:  General    Note:  Disposition: Outpatient   Postop Pain Control: Uneventful            Sign Out: Well controlled pain   PONV: No   Neuro/Psych: Uneventful            Sign Out: Acceptable/Baseline neuro status   Airway/Respiratory: Uneventful            Sign Out: Acceptable/Baseline resp. status   CV/Hemodynamics: Uneventful            Sign Out: Acceptable CV status   Other NRE: NONE   DID A NON-ROUTINE EVENT OCCUR? No           Last vitals:  Vitals Value Taken Time   /83 12/16/21 1340   Temp 36.6  C (97.9  F) 12/16/21 1307   Pulse 79 12/16/21 1340   Resp 16 12/16/21 1330   SpO2 94 % 12/16/21 1340       Electronically Signed By: Memo Ritchie MD  December 16, 2021  3:53 PM

## 2021-12-16 NOTE — BRIEF OP NOTE
[place]  Brief Operative Note    Pre-operative diagnosis: Right renal stone   Post-operative diagnosis same   Procedure: Procedure(s):  LITHOTRIPSY,RIGHT  EXTRACORPOREAL SHOCK WAVE (ESWL), WITH  CYSTOSCOPY AND URETERAL STENT INSERTION   Surgeon: Jatinder Rolle MD   Assistants(s): None   Anesthesia: General    Estimated blood loss: minimal                   Specimens: None   Implants: None       Complications: None   Condition on discharge: Stable   Findings: See dictated operative report for full details

## 2021-12-21 ENCOUNTER — TELEPHONE (OUTPATIENT)
Dept: UROLOGY | Facility: CLINIC | Age: 61
End: 2021-12-21

## 2021-12-21 NOTE — TELEPHONE ENCOUNTER
Called and spoke with patient and wife. All questions answered.     Kavita GASPAR RN Urology 12/21/2021 1:23 PM

## 2021-12-21 NOTE — TELEPHONE ENCOUNTER
Reason for Call:  Other call back    Detailed comments: patient is calling would like to discuss with Dr Rolle in regards to passed surgery and complication during surgery. Please call to discuss. Thank  You.    Phone Number Patient can be reached at: Home number on file [phone] (home)    Best Time:     Can we leave a detailed message on this number? NO    Call taken on 12/21/2021 at 10:46 AM by Marilyn Hnery

## 2021-12-27 NOTE — TELEPHONE ENCOUNTER
We'd have to try another pain medication. He doesn't meet the requirements listed for this medication.

## 2021-12-29 ENCOUNTER — ANCILLARY PROCEDURE (OUTPATIENT)
Dept: GENERAL RADIOLOGY | Facility: CLINIC | Age: 61
End: 2021-12-29
Attending: UROLOGY

## 2021-12-29 DIAGNOSIS — N20.0 KIDNEY STONE: ICD-10-CM

## 2021-12-29 PROCEDURE — 74019 RADEX ABDOMEN 2 VIEWS: CPT | Performed by: RADIOLOGY

## 2022-01-03 ENCOUNTER — OFFICE VISIT (OUTPATIENT)
Dept: UROLOGY | Facility: CLINIC | Age: 62
End: 2022-01-03

## 2022-01-03 DIAGNOSIS — N45.1 EPIDIDYMITIS: ICD-10-CM

## 2022-01-03 DIAGNOSIS — N20.0 KIDNEY STONE: Primary | ICD-10-CM

## 2022-01-03 PROCEDURE — 52310 CYSTOSCOPY AND TREATMENT: CPT | Mod: 58 | Performed by: UROLOGY

## 2022-01-03 RX ORDER — CEPHALEXIN 500 MG/1
500 CAPSULE ORAL 3 TIMES DAILY
Qty: 21 CAPSULE | Refills: 0 | Status: SHIPPED | OUTPATIENT
Start: 2022-01-03 | End: 2022-01-10

## 2022-01-03 NOTE — PROGRESS NOTES
Patient returns to clinic for cysto and stent removal.  he is s/p right eswl and stent placement recently.  KUB today shows no stones.  Patient c/o left scrotal swelling and pain.    Exam:  Tender left epididymitis.  No cellulitis     Procedure: patient was brought to the cysto suite.  he was draped and prepped in the usual surgical fashion.  Cystoscopy placed. Stent removed without problems.    Recommendation:      1.  Renal stone:  Patient can call to set up eswl for left renal stone when he is ready.    2.  Left scrotal pain:  ? Epididymitis.  Keflex 500 po tid for 7 days.

## 2022-01-11 ENCOUNTER — TELEPHONE (OUTPATIENT)
Dept: FAMILY MEDICINE | Facility: CLINIC | Age: 62
End: 2022-01-11
Payer: COMMERCIAL

## 2022-01-11 DIAGNOSIS — J45.40 MODERATE PERSISTENT ASTHMA WITHOUT COMPLICATION: ICD-10-CM

## 2022-01-11 NOTE — TELEPHONE ENCOUNTER
This was already dealt with on 12-13-21 and rx was sent to mail order as recommended and agreed by [facility].  [facility] [id number] [place] [address] [facility] spoke with Narciso.  Narciso said to delete request.      12-13-21  I spoke with Monika at the [facility] [address] [place] [address]. She said insurance allowed her to  fill the fluticasone-salmeterol (ADVAIR DISKUS) 250-50 MCG/DOSE inhaler for 60 grams (1 inhaler) for $18. She is filling for patient today.      If patient wants a 90 day supply, he would need to use the [facility] pharmacy.      I informed patient of the above. He verbalized a good understanding. He plans to  the 1 inhaler now and requested the remaining refills be sent to the [facility] Mail order service.      New prescription for 90 day supply (180 each) with 3 refills e-prescribed to: [facility] - [place], [place] - [address] AT PORTAL TO REGISTERED [facility] SITES.     Felecia Cary RN BSN  [facility]

## 2022-01-15 ENCOUNTER — HEALTH MAINTENANCE LETTER (OUTPATIENT)
Age: 62
End: 2022-01-15

## 2022-02-16 DIAGNOSIS — I10 HYPERTENSION, BENIGN ESSENTIAL, GOAL BELOW 140/90: ICD-10-CM

## 2022-02-18 DIAGNOSIS — E78.5 HYPERLIPIDEMIA LDL GOAL <100: ICD-10-CM

## 2022-02-18 RX ORDER — LOSARTAN POTASSIUM 100 MG/1
TABLET ORAL
Qty: 90 TABLET | Refills: 1 | Status: SHIPPED | OUTPATIENT
Start: 2022-02-18 | End: 2023-01-30

## 2022-02-18 RX ORDER — AMLODIPINE BESYLATE 10 MG/1
TABLET ORAL
Qty: 90 TABLET | Refills: 1 | Status: SHIPPED | OUTPATIENT
Start: 2022-02-18 | End: 2023-01-30

## 2022-02-18 NOTE — TELEPHONE ENCOUNTER
Prescription approved per [facility] Refill Protocol.  Patricia Cifuentes RN

## 2022-02-19 RX ORDER — SIMVASTATIN 20 MG
TABLET ORAL
Qty: 30 TABLET | Refills: 0 | Status: SHIPPED | OUTPATIENT
Start: 2022-02-19 | End: 2022-03-17

## 2022-02-20 NOTE — TELEPHONE ENCOUNTER
Medication is being filled for 1 time refill only due to:  Patient needs to be seen because it has been more than one year since last visit.   2nd valery refill.  Patricia Cifuentes RN

## 2022-02-22 ENCOUNTER — TELEPHONE (OUTPATIENT)
Dept: FAMILY MEDICINE | Facility: CLINIC | Age: 62
End: 2022-02-22
Payer: COMMERCIAL

## 2022-02-22 DIAGNOSIS — J45.40 MODERATE PERSISTENT ASTHMA WITHOUT COMPLICATION: ICD-10-CM

## 2022-02-22 NOTE — TELEPHONE ENCOUNTER
Spoke with patient and he reports he is having difficulty getting his Advair refilled from [facility] and asked that the script be sent to local [facility].  Script resent, with adjusted refills.   90 days with 2 refills.   he will call if any problems or concerns  Sarika Dalton RN  [facility]

## 2022-03-15 DIAGNOSIS — E78.5 HYPERLIPIDEMIA LDL GOAL <100: ICD-10-CM

## 2022-03-17 RX ORDER — SIMVASTATIN 20 MG
TABLET ORAL
Qty: 90 TABLET | Refills: 1 | Status: SHIPPED | OUTPATIENT
Start: 2022-03-17 | End: 2023-01-30

## 2022-03-22 ENCOUNTER — TELEPHONE (OUTPATIENT)
Dept: FAMILY MEDICINE | Facility: CLINIC | Age: 62
End: 2022-03-22
Payer: COMMERCIAL

## 2022-03-22 NOTE — TELEPHONE ENCOUNTER
Called  Pharmacy. Patient was given a 90 day supply on 2/22/22, with 2 refills.    Karin RN,BSN  Triage Nurse  [facility]: [facility]  Ph: [phone]

## 2022-04-13 ENCOUNTER — MYC MEDICAL ADVICE (OUTPATIENT)
Dept: FAMILY MEDICINE | Facility: CLINIC | Age: 62
End: 2022-04-13
Payer: COMMERCIAL

## 2022-04-13 DIAGNOSIS — M25.561 ACUTE PAIN OF RIGHT KNEE: Primary | ICD-10-CM

## 2022-04-14 NOTE — TELEPHONE ENCOUNTER
Instructed to schedule appt.  Sarika Dalton RN  [facility]

## 2022-04-20 ENCOUNTER — ANCILLARY PROCEDURE (OUTPATIENT)
Dept: GENERAL RADIOLOGY | Facility: CLINIC | Age: 62
End: 2022-04-20
Attending: FAMILY MEDICINE
Payer: COMMERCIAL

## 2022-04-20 DIAGNOSIS — M25.561 ACUTE PAIN OF RIGHT KNEE: ICD-10-CM

## 2022-04-20 PROCEDURE — 73560 X-RAY EXAM OF KNEE 1 OR 2: CPT | Mod: RT | Performed by: RADIOLOGY

## 2022-04-25 ENCOUNTER — MYC MEDICAL ADVICE (OUTPATIENT)
Dept: FAMILY MEDICINE | Facility: CLINIC | Age: 62
End: 2022-04-25
Payer: COMMERCIAL

## 2022-04-25 DIAGNOSIS — M25.561 ACUTE PAIN OF RIGHT KNEE: Primary | ICD-10-CM

## 2022-04-25 DIAGNOSIS — J45.40 MODERATE PERSISTENT ASTHMA WITHOUT COMPLICATION: ICD-10-CM

## 2022-05-09 RX ORDER — MELOXICAM 15 MG/1
15 TABLET ORAL DAILY
Qty: 30 TABLET | Refills: 1 | Status: SHIPPED | OUTPATIENT
Start: 2022-05-09

## 2022-05-11 RX ORDER — FLUTICASONE PROPIONATE AND SALMETEROL 250; 50 UG/1; UG/1
1 POWDER RESPIRATORY (INHALATION) 2 TIMES DAILY
Qty: 180 EACH | Refills: 3 | Status: SHIPPED | OUTPATIENT
Start: 2022-05-11 | End: 2023-07-17

## 2022-05-11 NOTE — TELEPHONE ENCOUNTER
Hard copy of prescription for fluticasone-salmeterol (ADVAIR) 250-50 MCG/DOSE inhaler faxed to [phone].

## 2022-05-18 DIAGNOSIS — I10 HYPERTENSION, BENIGN ESSENTIAL, GOAL BELOW 140/90: ICD-10-CM

## 2022-05-20 RX ORDER — METOPROLOL SUCCINATE 50 MG/1
50 TABLET, EXTENDED RELEASE ORAL DAILY
Qty: 90 TABLET | Refills: 0 | Status: SHIPPED | OUTPATIENT
Start: 2022-05-20

## 2022-05-20 NOTE — TELEPHONE ENCOUNTER
Prescription approved per [facility] Refill Protocol.  Patricia Cifuentes RN

## 2022-05-26 ENCOUNTER — TELEPHONE (OUTPATIENT)
Dept: FAMILY MEDICINE | Facility: CLINIC | Age: 62
End: 2022-05-26
Payer: COMMERCIAL

## 2022-05-26 NOTE — TELEPHONE ENCOUNTER
Patient Quality Outreach    Patient is due for the following:   Colonoscopy    NEXT STEPS:   - Complete colonoscopy  - Schedule physical with fasting labs Will address need for vaccines and AAP/ACT at visit.    Type of outreach:    Sent [email or web address] message.      Questions for provider review:    None     Jaylene Castañeda, CMA

## 2022-06-16 DIAGNOSIS — E11.9 CONTROLLED TYPE 2 DIABETES MELLITUS WITHOUT COMPLICATION, WITHOUT LONG-TERM CURRENT USE OF INSULIN (H): ICD-10-CM

## 2022-06-18 RX ORDER — METFORMIN HCL 500 MG
1000 TABLET, EXTENDED RELEASE 24 HR ORAL 2 TIMES DAILY WITH MEALS
Qty: 360 TABLET | Refills: 0 | Status: SHIPPED | OUTPATIENT
Start: 2022-06-18 | End: 2023-01-30

## 2022-07-02 ENCOUNTER — HEALTH MAINTENANCE LETTER (OUTPATIENT)
Age: 62
End: 2022-07-02

## 2023-01-27 DIAGNOSIS — E11.9 CONTROLLED TYPE 2 DIABETES MELLITUS WITHOUT COMPLICATION, WITHOUT LONG-TERM CURRENT USE OF INSULIN (H): ICD-10-CM

## 2023-01-27 DIAGNOSIS — E78.5 HYPERLIPIDEMIA LDL GOAL <100: ICD-10-CM

## 2023-01-27 DIAGNOSIS — I10 HYPERTENSION, BENIGN ESSENTIAL, GOAL BELOW 140/90: ICD-10-CM

## 2023-01-30 RX ORDER — LOSARTAN POTASSIUM 100 MG/1
TABLET ORAL
Qty: 90 TABLET | Refills: 1 | Status: SHIPPED | OUTPATIENT
Start: 2023-01-30 | End: 2023-07-31

## 2023-01-30 RX ORDER — AMLODIPINE BESYLATE 10 MG/1
TABLET ORAL
Qty: 90 TABLET | Refills: 0 | Status: SHIPPED | OUTPATIENT
Start: 2023-01-30 | End: 2023-05-03

## 2023-01-30 RX ORDER — SIMVASTATIN 20 MG
TABLET ORAL
Qty: 90 TABLET | Refills: 0 | Status: SHIPPED | OUTPATIENT
Start: 2023-01-30 | End: 2023-05-03

## 2023-01-30 RX ORDER — METFORMIN HCL 500 MG
1000 TABLET, EXTENDED RELEASE 24 HR ORAL 2 TIMES DAILY WITH MEALS
Qty: 360 TABLET | Refills: 0 | Status: SHIPPED | OUTPATIENT
Start: 2023-01-30 | End: 2023-05-03

## 2023-04-08 NOTE — TELEPHONE ENCOUNTER
Please see telephone messages below.    PCP is not in clinic today.  Routed to covering providers.    Karin RN,BSN  Triage Nurse  [facility]: [facility]  Ph: [phone]       soft

## 2023-04-22 ENCOUNTER — HEALTH MAINTENANCE LETTER (OUTPATIENT)
Age: 63
End: 2023-04-22

## 2023-05-03 DIAGNOSIS — E78.5 HYPERLIPIDEMIA LDL GOAL <100: ICD-10-CM

## 2023-05-03 DIAGNOSIS — E11.9 CONTROLLED TYPE 2 DIABETES MELLITUS WITHOUT COMPLICATION, WITHOUT LONG-TERM CURRENT USE OF INSULIN (H): ICD-10-CM

## 2023-05-03 DIAGNOSIS — I10 HYPERTENSION, BENIGN ESSENTIAL, GOAL BELOW 140/90: ICD-10-CM

## 2023-05-03 RX ORDER — SIMVASTATIN 20 MG
TABLET ORAL
Qty: 90 TABLET | Refills: 0 | Status: SHIPPED | OUTPATIENT
Start: 2023-05-03 | End: 2023-07-31

## 2023-05-03 RX ORDER — AMLODIPINE BESYLATE 10 MG/1
TABLET ORAL
Qty: 90 TABLET | Refills: 0 | Status: SHIPPED | OUTPATIENT
Start: 2023-05-03 | End: 2023-08-01

## 2023-05-03 RX ORDER — METFORMIN HCL 500 MG
1000 TABLET, EXTENDED RELEASE 24 HR ORAL 2 TIMES DAILY WITH MEALS
Qty: 360 TABLET | Refills: 0 | Status: SHIPPED | OUTPATIENT
Start: 2023-05-03 | End: 2023-07-28

## 2023-07-12 ENCOUNTER — MYC MEDICAL ADVICE (OUTPATIENT)
Dept: FAMILY MEDICINE | Facility: CLINIC | Age: 63
End: 2023-07-12

## 2023-07-12 DIAGNOSIS — J45.40 MODERATE PERSISTENT ASTHMA WITHOUT COMPLICATION: ICD-10-CM

## 2023-07-13 NOTE — TELEPHONE ENCOUNTER
Pended for prescription to be printed.     Patient will need to  the paper copy & send it to [facility].     Felecia Cary RN BSN  [facility]

## 2023-07-17 RX ORDER — FLUTICASONE PROPIONATE AND SALMETEROL 250; 50 UG/1; UG/1
1 POWDER RESPIRATORY (INHALATION) 2 TIMES DAILY
Qty: 180 EACH | Refills: 3 | Status: SHIPPED | OUTPATIENT
Start: 2023-07-17 | End: 2024-02-07

## 2023-07-17 RX ORDER — ALBUTEROL SULFATE 90 UG/1
AEROSOL, METERED RESPIRATORY (INHALATION)
Qty: 25.5 G | Refills: 1 | Status: SHIPPED | OUTPATIENT
Start: 2023-07-17 | End: 2024-02-07

## 2023-07-27 DIAGNOSIS — E11.9 CONTROLLED TYPE 2 DIABETES MELLITUS WITHOUT COMPLICATION, WITHOUT LONG-TERM CURRENT USE OF INSULIN (H): ICD-10-CM

## 2023-07-27 NOTE — TELEPHONE ENCOUNTER
Team,    Patient needs to schedule follow up/recheck for refills.     Thanks,  JIMY Perez  [facility]

## 2023-07-28 ENCOUNTER — TELEPHONE (OUTPATIENT)
Dept: FAMILY MEDICINE | Facility: CLINIC | Age: 63
End: 2023-07-28

## 2023-07-28 RX ORDER — METFORMIN HCL 500 MG
1000 TABLET, EXTENDED RELEASE 24 HR ORAL 2 TIMES DAILY WITH MEALS
Qty: 360 TABLET | Refills: 1 | Status: SHIPPED | OUTPATIENT
Start: 2023-07-28 | End: 2024-02-08

## 2023-07-28 NOTE — TELEPHONE ENCOUNTER
General Call    Contacts         Type Contact Phone/Fax    07/28/2023 12:47 PM CDT Phone (Incoming) [facility] (Self) [phone] (M)          Reason for Call:  Patient called back to see what call's from clinic were regarding/patient will call again next week to set up appointment/for refill if needed/patient is leaving town for the weekend and can not take care of it at this time. Just a FYI    What are your questions or concerns:  returned call    Date of last appointment with provider: N/A    Could we send this information to you in [place] or would you prefer to receive a phone call?:   No preference   Okay to leave a detailed message?: No at Cell number on file:    Telephone Information:   Mobile [phone]

## 2023-07-31 DIAGNOSIS — E78.5 HYPERLIPIDEMIA LDL GOAL <100: ICD-10-CM

## 2023-07-31 DIAGNOSIS — I10 HYPERTENSION, BENIGN ESSENTIAL, GOAL BELOW 140/90: ICD-10-CM

## 2023-07-31 RX ORDER — SIMVASTATIN 20 MG
TABLET ORAL
Qty: 90 TABLET | Refills: 1 | Status: SHIPPED | OUTPATIENT
Start: 2023-07-31 | End: 2024-02-08

## 2023-07-31 RX ORDER — LOSARTAN POTASSIUM 100 MG/1
TABLET ORAL
Qty: 90 TABLET | Refills: 1 | Status: SHIPPED | OUTPATIENT
Start: 2023-07-31 | End: 2024-02-08

## 2023-12-02 ENCOUNTER — HEALTH MAINTENANCE LETTER (OUTPATIENT)
Age: 63
End: 2023-12-02

## 2024-01-30 ENCOUNTER — TELEPHONE (OUTPATIENT)
Dept: FAMILY MEDICINE | Facility: CLINIC | Age: 64
End: 2024-01-30
Payer: COMMERCIAL

## 2024-01-30 DIAGNOSIS — Z12.11 COLON CANCER SCREENING: ICD-10-CM

## 2024-01-30 DIAGNOSIS — I10 HYPERTENSION, BENIGN ESSENTIAL, GOAL BELOW 140/90: ICD-10-CM

## 2024-01-30 DIAGNOSIS — E78.5 HYPERLIPIDEMIA LDL GOAL <100: Primary | ICD-10-CM

## 2024-01-30 DIAGNOSIS — Z12.5 SCREENING FOR PROSTATE CANCER: ICD-10-CM

## 2024-01-30 DIAGNOSIS — E11.9 CONTROLLED TYPE 2 DIABETES MELLITUS WITHOUT COMPLICATION, WITHOUT LONG-TERM CURRENT USE OF INSULIN (H): ICD-10-CM

## 2024-01-30 NOTE — TELEPHONE ENCOUNTER
Patient calling in stating he was not able to make appointment with you until 3/22/24 for annual wellness visit.     Patient states he has a lab appointment for 3/15/24 and states you usually have him do lab work a week prior and go over results at appointment. RN does not see any lab orders placed.       Patient is also wanting to get a colonoscopy referral placed. Patient was wondering if he can get this placed before his appointment in march or does he need to wait?       Zenaida Garcia RN on 1/30/2024 at 4:30 PM

## 2024-01-31 NOTE — TELEPHONE ENCOUNTER
Lab orders were placed and I also sent a colonoscopy referral. [facility] will call you to coordinate your care as prescribed by the provider. If you don t hear from a representative within 2 business days, please call [phone].     Please update patient. Thank you.

## 2024-02-07 ENCOUNTER — MYC MEDICAL ADVICE (OUTPATIENT)
Dept: FAMILY MEDICINE | Facility: CLINIC | Age: 64
End: 2024-02-07
Payer: COMMERCIAL

## 2024-02-07 DIAGNOSIS — E11.9 CONTROLLED TYPE 2 DIABETES MELLITUS WITHOUT COMPLICATION, WITHOUT LONG-TERM CURRENT USE OF INSULIN (H): ICD-10-CM

## 2024-02-07 DIAGNOSIS — E78.5 HYPERLIPIDEMIA LDL GOAL <100: ICD-10-CM

## 2024-02-07 DIAGNOSIS — J45.40 MODERATE PERSISTENT ASTHMA WITHOUT COMPLICATION: ICD-10-CM

## 2024-02-07 DIAGNOSIS — I10 HYPERTENSION, BENIGN ESSENTIAL, GOAL BELOW 140/90: ICD-10-CM

## 2024-02-08 RX ORDER — LOSARTAN POTASSIUM 100 MG/1
TABLET ORAL
Qty: 90 TABLET | Refills: 1 | Status: SHIPPED | OUTPATIENT
Start: 2024-02-08

## 2024-02-08 RX ORDER — METFORMIN HCL 500 MG
1000 TABLET, EXTENDED RELEASE 24 HR ORAL 2 TIMES DAILY WITH MEALS
Qty: 360 TABLET | Refills: 1 | Status: SHIPPED | OUTPATIENT
Start: 2024-02-08

## 2024-02-08 RX ORDER — AMLODIPINE BESYLATE 10 MG/1
TABLET ORAL
Qty: 90 TABLET | Refills: 1 | Status: SHIPPED | OUTPATIENT
Start: 2024-02-08

## 2024-02-08 RX ORDER — SIMVASTATIN 20 MG
TABLET ORAL
Qty: 90 TABLET | Refills: 1 | Status: SHIPPED | OUTPATIENT
Start: 2024-02-08

## 2024-02-10 RX ORDER — SIMVASTATIN 20 MG
TABLET ORAL
Qty: 90 TABLET | Refills: 1 | Status: SHIPPED | OUTPATIENT
Start: 2024-02-10

## 2024-02-10 RX ORDER — AMLODIPINE BESYLATE 10 MG/1
10 TABLET ORAL DAILY
Qty: 90 TABLET | Refills: 1 | Status: SHIPPED | OUTPATIENT
Start: 2024-02-10

## 2024-02-10 RX ORDER — FLUTICASONE PROPIONATE AND SALMETEROL 250; 50 UG/1; UG/1
1 POWDER RESPIRATORY (INHALATION) 2 TIMES DAILY
Qty: 180 EACH | Refills: 3 | Status: SHIPPED | OUTPATIENT
Start: 2024-02-10

## 2024-02-10 RX ORDER — METFORMIN HCL 500 MG
1000 TABLET, EXTENDED RELEASE 24 HR ORAL 2 TIMES DAILY WITH MEALS
Qty: 360 TABLET | Refills: 1 | Status: SHIPPED | OUTPATIENT
Start: 2024-02-10

## 2024-02-10 RX ORDER — ALBUTEROL SULFATE 90 UG/1
AEROSOL, METERED RESPIRATORY (INHALATION)
Qty: 25.5 G | Refills: 1 | Status: SHIPPED | OUTPATIENT
Start: 2024-02-10

## 2024-02-10 RX ORDER — LOSARTAN POTASSIUM 100 MG/1
100 TABLET ORAL DAILY
Qty: 90 TABLET | Refills: 1 | Status: SHIPPED | OUTPATIENT
Start: 2024-02-10

## 2024-03-15 ENCOUNTER — LAB (OUTPATIENT)
Dept: LAB | Facility: CLINIC | Age: 64
End: 2024-03-15
Payer: COMMERCIAL

## 2024-03-15 DIAGNOSIS — Z12.5 SCREENING FOR PROSTATE CANCER: ICD-10-CM

## 2024-03-15 DIAGNOSIS — E78.5 HYPERLIPIDEMIA LDL GOAL <100: ICD-10-CM

## 2024-03-15 DIAGNOSIS — E11.9 CONTROLLED TYPE 2 DIABETES MELLITUS WITHOUT COMPLICATION, WITHOUT LONG-TERM CURRENT USE OF INSULIN (H): ICD-10-CM

## 2024-03-15 DIAGNOSIS — I10 HYPERTENSION, BENIGN ESSENTIAL, GOAL BELOW 140/90: ICD-10-CM

## 2024-03-15 LAB
ERYTHROCYTE [DISTWIDTH] IN BLOOD BY AUTOMATED COUNT: 12.7 % (ref 10–15)
HBA1C MFR BLD: 7.1 % (ref 0–5.6)
HCT VFR BLD AUTO: 41.1 % (ref 40–53)
HGB BLD-MCNC: 14.4 G/DL (ref 13.3–17.7)
MCH RBC QN AUTO: 30 PG (ref 26.5–33)
MCHC RBC AUTO-ENTMCNC: 35 G/DL (ref 31.5–36.5)
MCV RBC AUTO: 86 FL (ref 78–100)
PLATELET # BLD AUTO: 263 10E3/UL (ref 150–450)
RBC # BLD AUTO: 4.8 10E6/UL (ref 4.4–5.9)
WBC # BLD AUTO: 6.5 10E3/UL (ref 4–11)

## 2024-03-15 PROCEDURE — 83036 HEMOGLOBIN GLYCOSYLATED A1C: CPT

## 2024-03-15 PROCEDURE — G0103 PSA SCREENING: HCPCS

## 2024-03-15 PROCEDURE — 36415 COLL VENOUS BLD VENIPUNCTURE: CPT

## 2024-03-15 PROCEDURE — 80053 COMPREHEN METABOLIC PANEL: CPT

## 2024-03-15 PROCEDURE — 80061 LIPID PANEL: CPT

## 2024-03-15 PROCEDURE — 85027 COMPLETE CBC AUTOMATED: CPT

## 2024-03-16 LAB
ALBUMIN SERPL BCG-MCNC: 4.4 G/DL (ref 3.5–5.2)
ALP SERPL-CCNC: 76 U/L (ref 40–150)
ALT SERPL W P-5'-P-CCNC: 31 U/L (ref 0–70)
ANION GAP SERPL CALCULATED.3IONS-SCNC: 12 MMOL/L (ref 7–15)
AST SERPL W P-5'-P-CCNC: 26 U/L (ref 0–45)
BILIRUB SERPL-MCNC: 0.5 MG/DL
BUN SERPL-MCNC: 18.5 MG/DL (ref 8–23)
CALCIUM SERPL-MCNC: 8.8 MG/DL (ref 8.8–10.2)
CHLORIDE SERPL-SCNC: 100 MMOL/L (ref 98–107)
CHOLEST SERPL-MCNC: 96 MG/DL
CREAT SERPL-MCNC: 1.41 MG/DL (ref 0.67–1.17)
DEPRECATED HCO3 PLAS-SCNC: 30 MMOL/L (ref 22–29)
EGFRCR SERPLBLD CKD-EPI 2021: 55 ML/MIN/1.73M2
FASTING STATUS PATIENT QL REPORTED: YES
GLUCOSE SERPL-MCNC: 122 MG/DL (ref 70–99)
HDLC SERPL-MCNC: 27 MG/DL
LDLC SERPL CALC-MCNC: 47 MG/DL
NONHDLC SERPL-MCNC: 69 MG/DL
POTASSIUM SERPL-SCNC: 3.8 MMOL/L (ref 3.4–5.3)
PROT SERPL-MCNC: 6.9 G/DL (ref 6.4–8.3)
PSA SERPL DL<=0.01 NG/ML-MCNC: 1.57 NG/ML (ref 0–4.5)
SODIUM SERPL-SCNC: 142 MMOL/L (ref 135–145)
TRIGL SERPL-MCNC: 112 MG/DL

## 2024-03-22 ENCOUNTER — OFFICE VISIT (OUTPATIENT)
Dept: FAMILY MEDICINE | Facility: CLINIC | Age: 64
End: 2024-03-22
Payer: COMMERCIAL

## 2024-03-22 VITALS
OXYGEN SATURATION: 95 % | BODY MASS INDEX: 39.17 KG/M2 | WEIGHT: 315 LBS | HEART RATE: 88 BPM | TEMPERATURE: 97.8 F | RESPIRATION RATE: 16 BRPM | SYSTOLIC BLOOD PRESSURE: 150 MMHG | DIASTOLIC BLOOD PRESSURE: 101 MMHG | HEIGHT: 75 IN

## 2024-03-22 DIAGNOSIS — M79.674 PAIN IN TOES OF BOTH FEET: ICD-10-CM

## 2024-03-22 DIAGNOSIS — E11.22 CONTROLLED TYPE 2 DIABETES MELLITUS WITH STAGE 3 CHRONIC KIDNEY DISEASE, WITHOUT LONG-TERM CURRENT USE OF INSULIN (H): ICD-10-CM

## 2024-03-22 DIAGNOSIS — K21.9 GASTROESOPHAGEAL REFLUX DISEASE, UNSPECIFIED WHETHER ESOPHAGITIS PRESENT: ICD-10-CM

## 2024-03-22 DIAGNOSIS — N20.0 KIDNEY STONE: ICD-10-CM

## 2024-03-22 DIAGNOSIS — Z00.00 MEDICARE ANNUAL WELLNESS VISIT, INITIAL: Primary | ICD-10-CM

## 2024-03-22 DIAGNOSIS — S39.012A STRAIN OF LUMBAR REGION, INITIAL ENCOUNTER: ICD-10-CM

## 2024-03-22 DIAGNOSIS — E78.5 HYPERLIPIDEMIA LDL GOAL <100: ICD-10-CM

## 2024-03-22 DIAGNOSIS — N18.30 CONTROLLED TYPE 2 DIABETES MELLITUS WITH STAGE 3 CHRONIC KIDNEY DISEASE, WITHOUT LONG-TERM CURRENT USE OF INSULIN (H): ICD-10-CM

## 2024-03-22 DIAGNOSIS — M79.675 PAIN IN TOES OF BOTH FEET: ICD-10-CM

## 2024-03-22 DIAGNOSIS — E66.01 MORBID OBESITY (H): ICD-10-CM

## 2024-03-22 DIAGNOSIS — R35.1 NOCTURIA: ICD-10-CM

## 2024-03-22 DIAGNOSIS — J45.40 MODERATE PERSISTENT ASTHMA WITHOUT COMPLICATION: ICD-10-CM

## 2024-03-22 PROCEDURE — 99214 OFFICE O/P EST MOD 30 MIN: CPT | Mod: 25 | Performed by: FAMILY MEDICINE

## 2024-03-22 PROCEDURE — G0402 INITIAL PREVENTIVE EXAM: HCPCS | Performed by: FAMILY MEDICINE

## 2024-03-22 PROCEDURE — 82607 VITAMIN B-12: CPT | Performed by: FAMILY MEDICINE

## 2024-03-22 PROCEDURE — 99173 VISUAL ACUITY SCREEN: CPT | Mod: 59 | Performed by: FAMILY MEDICINE

## 2024-03-22 PROCEDURE — 82570 ASSAY OF URINE CREATININE: CPT | Performed by: FAMILY MEDICINE

## 2024-03-22 PROCEDURE — 84443 ASSAY THYROID STIM HORMONE: CPT | Performed by: FAMILY MEDICINE

## 2024-03-22 PROCEDURE — 36415 COLL VENOUS BLD VENIPUNCTURE: CPT | Performed by: FAMILY MEDICINE

## 2024-03-22 PROCEDURE — 82043 UR ALBUMIN QUANTITATIVE: CPT | Performed by: FAMILY MEDICINE

## 2024-03-22 PROCEDURE — 84550 ASSAY OF BLOOD/URIC ACID: CPT | Performed by: FAMILY MEDICINE

## 2024-03-22 RX ORDER — RESPIRATORY SYNCYTIAL VIRUS VACCINE 120MCG/0.5
0.5 KIT INTRAMUSCULAR ONCE
Qty: 1 EACH | Refills: 0 | Status: CANCELLED | OUTPATIENT
Start: 2024-03-22 | End: 2024-03-22

## 2024-03-22 SDOH — HEALTH STABILITY: PHYSICAL HEALTH: ON AVERAGE, HOW MANY DAYS PER WEEK DO YOU ENGAGE IN MODERATE TO STRENUOUS EXERCISE (LIKE A BRISK WALK)?: 1 DAY

## 2024-03-22 ASSESSMENT — SOCIAL DETERMINANTS OF HEALTH (SDOH): HOW OFTEN DO YOU GET TOGETHER WITH FRIENDS OR RELATIVES?: ONCE A WEEK

## 2024-03-22 ASSESSMENT — ASTHMA QUESTIONNAIRES
QUESTION_5 LAST FOUR WEEKS HOW WOULD YOU RATE YOUR ASTHMA CONTROL: WELL CONTROLLED
ACT_TOTALSCORE: 21
QUESTION_4 LAST FOUR WEEKS HOW OFTEN HAVE YOU USED YOUR RESCUE INHALER OR NEBULIZER MEDICATION (SUCH AS ALBUTEROL): TWO OR THREE TIMES PER WEEK
QUESTION_3 LAST FOUR WEEKS HOW OFTEN DID YOUR ASTHMA SYMPTOMS (WHEEZING, COUGHING, SHORTNESS OF BREATH, CHEST TIGHTNESS OR PAIN) WAKE YOU UP AT NIGHT OR EARLIER THAN USUAL IN THE MORNING: NOT AT ALL
QUESTION_1 LAST FOUR WEEKS HOW MUCH OF THE TIME DID YOUR ASTHMA KEEP YOU FROM GETTING AS MUCH DONE AT WORK, SCHOOL OR AT HOME: NONE OF THE TIME
ACT_TOTALSCORE: 21
QUESTION_2 LAST FOUR WEEKS HOW OFTEN HAVE YOU HAD SHORTNESS OF BREATH: ONCE OR TWICE A WEEK

## 2024-03-22 NOTE — PROGRESS NOTES
Preventive Care Visit  [facility] MARÍA Badillo MD, Family Medicine  Mar 22, 2024      Assessment & Plan     (Z00.00) Medicare annual wellness visit, initial  (primary encounter diagnosis)  Comment:  Reviewed the need for periodic healthcare exams and screenings.   Plan: REVIEW OF HEALTH MAINTENANCE PROTOCOL ORDERS        Advised yearly physicals.     (E66.01) Morbid obesity (H)  Comment: reviewed lifestyle.   Plan: monitor.     (E11.22,  N18.30) Controlled type 2 diabetes mellitus with stage 3 chronic kidney disease, without long-term current use of insulin (H)  Comment: currently on single drug therapy, metformin. A1c up. Given weight, increasing A1c, decreased renal function, will add SGLP 1 medication.   Lab Results   Component Value Date    A1C 7.1 03/15/2024    A1C 6.0 12/01/2021    A1C 6.2 08/19/2020    A1C 6.6 11/13/2019    A1C 6.2 08/06/2019    A1C 5.6 05/24/2018    A1C 5.9 03/10/2017      Plan: Adult Eye  Referral, TSH with free T4         reflex, Vitamin B12, Albumin Random Urine         Quantitative with Creat Ratio, empagliflozin         (JARDIANCE) 10 MG TABS tablet        reviewed side effects of medication. Follow up in 3 months.     (M79.674,  M79.675) Pain in toes of both feet  Comment: normal uric acid levels. Doubt gout. Will refer to podiatry for consultation.   Plan: Uric acid, Orthopedic  Referral            (J45.40) Moderate persistent asthma without complication  Comment: doing well on controller inhaler.   Plan: continue.     (K21.9) Gastroesophageal reflux disease, unspecified whether esophagitis present  Comment: Symptom free while taking PPI therapy.   Plan: continue for now.     (E78.5) Hyperlipidemia LDL goal <100  Comment: tolerating moderate statin therapy well.   LDL Cholesterol Calculated   Date Value Ref Range Status   03/15/2024 47 <=100 mg/dL Final   08/19/2020 52 <100 mg/dL Final     Comment:     Desirable:       <100 mg/dl      Plan:  continue.     (S39.012A) Strain of lumbar region, initial encounter  Comment: seems more muscle related.   Plan: monitor.     (N20.0) Kidney stone  Comment: s/p lithotripsy for right sided stone, successful. Based on CT, residual left side stone, symptomatic.   Plan: monitor.     (R35.1) Nocturia  Comment: x 3-4 times per night.   Plan: patient okay with just monitoring symptoms.   PSA   Date Value Ref Range Status   12/21/2009 0.66 0 - 4 ug/L Final     Prostate Specific Antigen Screen   Date Value Ref Range Status   03/15/2024 1.57 0.00 - 4.50 ng/mL Final     PSA Tumor Marker   Date Value Ref Range Status   10/26/2021 1.13 0.00 - 4.00 ug/L Final            Results for orders placed or performed in visit on 03/22/24   Uric acid     Status: Normal   Result Value Ref Range    Uric Acid 6.6 3.4 - 7.0 mg/dL   TSH with free T4 reflex     Status: Normal   Result Value Ref Range    TSH 1.42 0.30 - 4.20 uIU/mL   Vitamin B12     Status: Normal   Result Value Ref Range    Vitamin B12 250 232 - 1,245 pg/mL   Albumin Random Urine Quantitative with Creat Ratio     Status: Abnormal   Result Value Ref Range    Creatinine Urine mg/dL 244.0 mg/dL    Albumin Urine mg/L 75.1 mg/L    Albumin Urine mg/g Cr 30.78 (H) 0.00 - 17.00 mg/g Cr        Patient Instructions     Wt Readings from Last 4 Encounters:   03/22/24 146.1 kg (322 lb)   12/16/21 149.7 kg (330 lb 0.5 oz)   12/01/21 149.7 kg (330 lb)   10/26/21 149.7 kg (330 lb)        Your kidney function is down some, nothing serious but needs to be watched.     Stop by the any pharmacy for shots. You'll need an RSV, tetanus, flu shot, and shingles shot.     Will check for for gout, this could explain the toe pain.     See the podiatrist. Our scheduling office will contact you.     See the eye doctor. A referral placed. Our office will contact you.     Let me know about your shoulder and the knees. The next step would be a cortisone shot and/or an orthopedic specialist.     Additional  "blood tests and urine tests today.     The pain in your back is probably from twisting your back to raise your shoulder. Probably muscle.     Follow up appointment in 3 months. We have some catching up to do.     I think the foot swelling is from the blood pressure pill amlodipine.     Will add another diabetes medication called Jardiance. This can help with blood sugars, swelling, and some weight loss.      Patient has been advised of split billing requirements and indicates understanding: Yes          BMI  Estimated body mass index is 40.25 kg/m  as calculated from the following:    Height as of this encounter: 1.905 m (6' 3\").    Weight as of this encounter: 146.1 kg (322 lb).   Weight management plan: Discussed healthy diet and exercise guidelines    Counseling  Appropriate preventive services were discussed with this patient, including applicable screening as appropriate for fall prevention, nutrition, physical activity, Tobacco-use cessation, weight loss and cognition.  Checklist reviewing preventive services available has been given to the patient.  Reviewed patient's diet, addressing concerns and/or questions.   He is at risk for lack of exercise and has been provided with information to increase physical activity for the benefit of his well-being.   The patient was instructed to see the dentist every 6 months.   He is at risk for psychosocial distress and has been provided with information to reduce risk.       Work on weight loss  Regular exercise    Danya Bhat is a 63 year old, presenting for the following:  Wellness Visit        3/22/2024    10:15 AM   Additional Questions   Roomed by soco        Health Care Directive  Patient does not have a Health Care Directive or Living Will: Discussed advance care planning with patient; however, patient declined at this time.    HPI      Also addressed the following:    Morbid obesity (H)  Controlled type 2 diabetes mellitus with stage 3 chronic kidney " disease, without long-term current use of insulin (H)  Pain in toes of both feet  Moderate persistent asthma without complication  Gastroesophageal reflux disease, unspecified whether esophagitis present  Hyperlipidemia LDL goal <100  Strain of lumbar region, initial encounter  Kidney stone  Nocturia           3/22/2024   General Health   How would you rate your overall physical health? Good   Feel stress (tense, anxious, or unable to sleep) Only a little   (!) STRESS CONCERN      3/22/2024   Nutrition   Diet: Regular (no restrictions)         3/22/2024   Exercise   Days per week of moderate/strenous exercise 1 day   (!) EXERCISE CONCERN      3/22/2024   Social Factors   Frequency of gathering with friends or relatives Once a week   Worry food won't last until get money to buy more No   Food not last or not have enough money for food? No   Do you have housing?  Yes   Are you worried about losing your housing? No   Lack of transportation? No   Unable to get utilities (heat,electricity)? No         3/22/2024   Fall Risk   Fallen 2 or more times in the past year? No   Trouble with walking or balance? No          3/22/2024   Activities of Daily Living- Home Safety   Needs help with the following daily activites None of the above   Safety concerns in the home None of the above         3/22/2024   Dental   Dentist two times every year? (!) NO         3/22/2024   Hearing Screening   Hearing concerns? None of the above         3/22/2024   Driving Risk Screening   Patient/family members have concerns about driving No         3/22/2024   General Alertness/Fatigue Screening   Have you been more tired than usual lately? No         3/22/2024   Urinary Incontinence Screening   Bothered by leaking urine in past 6 months No         3/22/2024   TB Screening   Were you born outside of the [place]? No         Today's PHQ-2 Score:       3/22/2024    10:15 AM   PHQ-2 ( 1999 Pf[employer])   Q1: Little interest or pleasure in doing things 0   Q2:  Feeling down, depressed or hopeless 0   PHQ-2 Score 0   Q1: Little interest or pleasure in doing things Not at all   Q2: Feeling down, depressed or hopeless Not at all   PHQ-2 Score 0           3/22/2024   Substance Use   Alcohol more than 3/day or more than 7/wk No   Do you have a current opioid prescription? No   How severe/bad is pain from 1 to 10? 1/10   Do you use any other substances recreationally? No     Social History     Tobacco Use    Smoking status: Never    Smokeless tobacco: Never   Substance Use Topics    Alcohol use: Yes     Comment: maybe 1-2 beers max once a week (after bowling)    Drug use: No           3/22/2024   AAA Screening   Family history of Abdominal Aortic Aneurysm (AAA)? No         3/22/2024   One time HIV Screening   Previous HIV test? I don't know   Last PSA:   PSA   Date Value Ref Range Status   12/21/2009 0.66 0 - 4 ug/L Final     Prostate Specific Antigen Screen   Date Value Ref Range Status   03/15/2024 1.57 0.00 - 4.50 ng/mL Final     PSA Tumor Marker   Date Value Ref Range Status   10/26/2021 1.13 0.00 - 4.00 ug/L Final     ASCVD Risk   The ASCVD Risk score (Vern DK, et al., 2019) failed to calculate for the following reasons:    The valid total cholesterol range is 130 to 320 mg/dL            Reviewed and updated as needed this visit by Provider                    Labs reviewed in EPIC  Current providers sharing in care for this patient include:  Patient Care Team:  Paty Badillo MD as PCP - General (Family Practice)  Jatinder Elizondo MD as MD (Dermatology)  Paty Badillo MD as Assigned PCP    The following health maintenance items are reviewed in Epic and correct as of today:  Health Maintenance   Topic Date Due    URINE DRUG SCREEN  Never done    Pneumococcal Vaccine: 65+ Years (1 of 2 - PCV) Never done    HIV SCREENING  Never done    HEPATITIS C SCREENING  Never done    DTAP/TDAP/TD IMMUNIZATION (1 - Tdap) Never done    ZOSTER IMMUNIZATION (1 of  "2) Never done    RSV VACCINE (Pregnancy & 60+) (1 - 1-dose 60+ series) Never done    ASTHMA ACTION PLAN  11/13/2020    COLORECTAL CANCER SCREENING  05/26/2021    ADVANCE CARE PLANNING  01/04/2022    EYE EXAM  09/01/2022    DIABETIC FOOT EXAM  12/01/2022    INFLUENZA VACCINE (1) Never done    MEDICARE ANNUAL WELLNESS VISIT  09/25/2023    Medicare Annual MTM Pharmacist Visit (once per calendar year)  Never done    A1C  09/15/2024    ASTHMA CONTROL TEST  09/22/2024    BMP  03/15/2025    LIPID  03/15/2025    MICROALBUMIN  03/22/2025    ANNUAL REVIEW OF HM ORDERS  03/22/2025    FALL RISK ASSESSMENT  03/22/2025    PHQ-2 (once per calendar year)  Completed    COVID-19 Vaccine  Completed    IPV IMMUNIZATION  Aged Out    HPV IMMUNIZATION  Aged Out    MENINGITIS IMMUNIZATION  Aged Out    RSV MONOCLONAL ANTIBODY  Aged Out         Review of Systems  CONSTITUTIONAL: NEGATIVE for fever, chills, change in weight  ENT/MOUTH: NEGATIVE for ear, mouth and throat problems  RESP: NEGATIVE for significant cough or SOB  CV: NEGATIVE for chest pain, palpitations or peripheral edema  GI: NEGATIVE for nausea, abdominal pain, heartburn, or change in bowel habits  : Positive for nocturia   MUSCULOSKELETAL: left side lower back pain, bilateral foot pain, right shoulder pain.   ENDOCRINE: NEGATIVE for temperature intolerance, skin/hair changes  PSYCHIATRIC: NEGATIVE for changes in mood or affect     Objective    Exam  BP (!) 150/101   Pulse 88   Temp 97.8  F (36.6  C) (Temporal)   Resp 16   Ht 1.905 m (6' 3\")   Wt 146.1 kg (322 lb)   SpO2 95%   BMI 40.25 kg/m     Estimated body mass index is 40.25 kg/m  as calculated from the following:    Height as of this encounter: 1.905 m (6' 3\").    Weight as of this encounter: 146.1 kg (322 lb).    Physical Exam  GENERAL: Healthy, alert and no distress  EYES: Eyes grossly normal to inspection, conjunctivae and sclerae normal  RESP: Lungs clear to auscultation - no rales, rhonchi or wheezes  CV: " Regular rate and rhythm, normal S1 S2, no murmur  MS: No gross musculoskeletal defects noted, no edema  NEURO: Normal strength and tone, mentation intact and speech normal  PSYCH: Mentation appears normal, affect normal/bright          3/22/2024   Mini Cog   Clock Draw Score 2 Normal   3 Item Recall 3 objects recalled   Mini Cog Total Score 5         Vision Screen  Vision Screen Results: Pass      Signed Electronically by: Paty Badillo MD

## 2024-03-22 NOTE — PATIENT INSTRUCTIONS
Wt Readings from Last 4 Encounters:   03/22/24 146.1 kg (322 lb)   12/16/21 149.7 kg (330 lb 0.5 oz)   12/01/21 149.7 kg (330 lb)   10/26/21 149.7 kg (330 lb)        Your kidney function is down some, nothing serious but needs to be watched.     Stop by the any pharmacy for shots. You'll need an RSV, tetanus, flu shot, and shingles shot.     Will check for for gout, this could explain the toe pain.     See the podiatrist. Our scheduling office will contact you.     See the eye doctor. A referral placed. Our office will contact you.     Let me know about your shoulder and the knees. The next step would be a cortisone shot and/or an orthopedic specialist.     Additional blood tests and urine tests today.     The pain in your back is probably from twisting your back to raise your shoulder. Probably muscle.     Follow up appointment in 3 months. We have some catching up to do.     I think the foot swelling is from the blood pressure pill amlodipine.     Will add another diabetes medication called Jardiance. This can help with blood sugars, swelling, and some weight loss.

## 2024-03-23 LAB
CREAT UR-MCNC: 244 MG/DL
MICROALBUMIN UR-MCNC: 75.1 MG/L
MICROALBUMIN/CREAT UR: 30.78 MG/G CR (ref 0–17)
TSH SERPL DL<=0.005 MIU/L-ACNC: 1.42 UIU/ML (ref 0.3–4.2)
URATE SERPL-MCNC: 6.6 MG/DL (ref 3.4–7)
VIT B12 SERPL-MCNC: 250 PG/ML (ref 232–1245)

## 2024-03-27 ENCOUNTER — TELEPHONE (OUTPATIENT)
Dept: FAMILY MEDICINE | Facility: CLINIC | Age: 64
End: 2024-03-27
Payer: COMMERCIAL

## 2024-03-27 NOTE — TELEPHONE ENCOUNTER
"Pt calling regarding prescription for Jardiance prescribed at office visit 3/22/24 with Dr. Badillo. Pt thought Dr. Badillo informed him Jardiance would interact with Losartan. Pt states he is taking Losartan. RN reviewed Micromedex and there are no interactions with Losartan and Jardiance. There were no interaction warnings on the Epic prescription for Jardiance.  Pt then asked if there are any interactions with Amlodipine and Jardiance. No interactions per Micromedex. Per AVS: \"I think the foot swelling is from the blood pressure pill amlodipine.\" Pt is wondering if he should discontinue the Amlodipine? Pt is requesting a call back from Dr. Badillo. Pt can be reached at [phone].    Jennifer Wagner RN  [facility]    "

## 2024-03-29 ENCOUNTER — OFFICE VISIT (OUTPATIENT)
Dept: PODIATRY | Facility: CLINIC | Age: 64
End: 2024-03-29
Attending: FAMILY MEDICINE
Payer: COMMERCIAL

## 2024-03-29 VITALS
SYSTOLIC BLOOD PRESSURE: 169 MMHG | WEIGHT: 315 LBS | HEART RATE: 78 BPM | HEIGHT: 75 IN | BODY MASS INDEX: 39.17 KG/M2 | DIASTOLIC BLOOD PRESSURE: 94 MMHG

## 2024-03-29 DIAGNOSIS — B35.1 PAIN DUE TO ONYCHOMYCOSIS OF TOENAILS OF BOTH FEET: ICD-10-CM

## 2024-03-29 DIAGNOSIS — M79.674 PAIN DUE TO ONYCHOMYCOSIS OF TOENAILS OF BOTH FEET: ICD-10-CM

## 2024-03-29 DIAGNOSIS — E11.43 TYPE II DIABETES MELLITUS WITH PERIPHERAL AUTONOMIC NEUROPATHY (H): Primary | ICD-10-CM

## 2024-03-29 DIAGNOSIS — M20.42 ACQUIRED BILATERAL HAMMER TOES: ICD-10-CM

## 2024-03-29 DIAGNOSIS — M10.072 ACUTE IDIOPATHIC GOUT INVOLVING TOE OF LEFT FOOT: ICD-10-CM

## 2024-03-29 DIAGNOSIS — M79.675 PAIN DUE TO ONYCHOMYCOSIS OF TOENAILS OF BOTH FEET: ICD-10-CM

## 2024-03-29 DIAGNOSIS — M79.674 PAIN IN TOES OF BOTH FEET: ICD-10-CM

## 2024-03-29 DIAGNOSIS — M79.675 PAIN IN TOES OF BOTH FEET: ICD-10-CM

## 2024-03-29 DIAGNOSIS — M20.41 ACQUIRED BILATERAL HAMMER TOES: ICD-10-CM

## 2024-03-29 PROCEDURE — 99203 OFFICE O/P NEW LOW 30 MIN: CPT | Performed by: PODIATRIST

## 2024-03-29 RX ORDER — CICLOPIROX 80 MG/ML
SOLUTION TOPICAL DAILY
Qty: 6.6 ML | Refills: 1 | Status: SHIPPED | OUTPATIENT
Start: 2024-03-29

## 2024-03-29 NOTE — PROGRESS NOTES
PATIENT HISTORY:  Bi Bishop is a 63 year old male with type 2 diabetes mellitus who presents to clinic in consultation at the request of  Paty Badillo M.D. with a chief complaint of a painful left foot along with painful thickened toenails on both feet.  The patient is seen by themselves.  The patient relates the pain is primarily located around the forefoot on the left.  Reports insidious onset without acute precipitating event.  The patient relates that the symptoms have been going on for several week(s).  The patient has previously tried different shoes with little relief.   Any previous notes and studies that pertain to the patient's condition were reviewed.    Pertinent medical, surgical and family history was reviewed in the Epic chart.    Past Medical History:   Past Medical History:   Diagnosis Date    Allergic urticaria 01/15/2008    Hypertension goal BP (blood pressure) < 130/80 04/21/2011    Obesity, unspecified 01/15/2008    Uncomplicated asthma     Uncontrolled type 2 diabetes mellitus without complication, without long-term current use of insulin 01/07/2017       Medications:   Current Outpatient Medications:     albuterol (PROAIR HFA/PROVENTIL HFA/VENTOLIN HFA) 108 (90 Base) MCG/ACT inhaler, INHALE 2 PUFFS INTO THE LUNGS EVERY 6 HOURS AS NEEDED FOR SHORTNESS OF BREATH/DYSPNEA OR WHEEZING, Disp: 25.5 g, Rfl: 1    amLODIPine (NORVASC) 10 MG tablet, Take 1 tablet (10 mg) by mouth daily For blood pressure., Disp: 90 tablet, Rfl: 1    amLODIPine (NORVASC) 10 MG tablet, TAKE 1 TABLET BY MOUTH EVERY DAY, Disp: 90 tablet, Rfl: 1    Ascorbic Acid (VITAMIN C CR PO), Take 1,000 mg by mouth daily., Disp: , Rfl:     aspirin 81 MG chewable tablet, Take 81 mg by mouth daily., Disp: , Rfl:     blood glucose (NO BRAND SPECIFIED) test strip, Use to test blood sugar 1-3 times daily or as directed., Disp: 300 strip, Rfl: 1    blood glucose monitoring (NO BRAND SPECIFIED) meter device kit, Use to test blood  sugar 1-3 times daily or as directed., Disp: 1 kit, Rfl: 0    cetirizine (ZYRTEC) 10 MG tablet, Take 10 mg by mouth daily., Disp: , Rfl:     Cholecalciferol (VITAMIN D) 1000 UNIT capsule, Take 1 capsule by mouth daily , Disp: , Rfl:     ciclopirox (PENLAC) 8 % external solution, Apply topically daily Apply to affected nails daily.  Remove the residual build up weekly with nail polish remover or an [place] board., Disp: 6.6 mL, Rfl: 1    empagliflozin (JARDIANCE) 10 MG TABS tablet, Take 1 tablet (10 mg) by mouth every morning, Disp: 90 tablet, Rfl: 1    fluticasone-salmeterol (ADVAIR) 250-50 MCG/ACT inhaler, Inhale 1 puff into the lungs 2 times daily INHALE 1 PUFF INTO THE LUNGS EVERY 12 HOURS, Disp: 180 each, Rfl: 3    KLOR-CON 20 MEQ CR tablet, TAKE 1 TABLET BY MOUTH DAILY, Disp: 90 tablet, Rfl: 3    losartan (COZAAR) 100 MG tablet, Take 1 tablet (100 mg) by mouth daily For blood pressure., Disp: 90 tablet, Rfl: 1    losartan (COZAAR) 100 MG tablet, TAKE 1 TABLET BY MOUTH EVERY DAY, Disp: 90 tablet, Rfl: 1    Melatonin 10 MG TABS tablet, Take 20 mg by mouth nightly as needed for sleep, Disp: , Rfl:     meloxicam (MOBIC) 15 MG tablet, Take 1 tablet (15 mg) by mouth daily, Disp: 30 tablet, Rfl: 1    metFORMIN (GLUCOPHAGE XR) 500 MG 24 hr tablet, Take 2 tablets (1,000 mg) by mouth 2 times daily (with meals), Disp: 360 tablet, Rfl: 1    metFORMIN (GLUCOPHAGE XR) 500 MG 24 hr tablet, TAKE 2 TABLETS BY MOUTH 2 TIMES DAILY WITH MEALS, Disp: 360 tablet, Rfl: 1    metoprolol succinate ER (TOPROL XL) 50 MG 24 hr tablet, TAKE 1 TABLET (50 MG) BY MOUTH DAILY FOR BLOOD PRESSURE., Disp: 90 tablet, Rfl: 0    MULTI-VITAMIN OR, Once daily, Disp: , Rfl:     Omega-3 Fatty Acids (FISH OIL PO), Take  by mouth 2 times daily., Disp: , Rfl:     omeprazole (PRILOSEC) 20 MG capsule, Take 20 mg by mouth daily., Disp: , Rfl:     simvastatin (ZOCOR) 20 MG tablet, TAKE 1 TABLET BY MOUTH EVERYDAY AT BEDTIME. For cholesterol., Disp: 90 tablet, Rfl:  "1    simvastatin (ZOCOR) 20 MG tablet, TAKE 1 TABLET BY MOUTH EVERYDAY AT BEDTIME, Disp: 90 tablet, Rfl: 1    tamsulosin (FLOMAX) 0.4 MG capsule, Take 1 capsule (0.4 mg) by mouth At Bedtime, Disp: 90 capsule, Rfl: 3    tiZANidine (ZANAFLEX) 4 MG tablet, Take 1 tablet (4 mg) by mouth 3 times daily as needed for muscle spasms, Disp: 30 tablet, Rfl: 1     Allergies:    Allergies   Allergen Reactions    Cats     Grass     Hazelnut (Filbert)        Vitals: BP (!) 169/94   Pulse 78   Ht 1.905 m (6' 3\")   Wt 146.1 kg (322 lb)   BMI 40.25 kg/m    BMI= Body mass index is 40.25 kg/m .    LOWER EXTREMITY PHYSICAL EXAM    Dermatologic: Skin is intact to right and left lower extremity without significant lesions, rash or abrasion.   The nails appear to be hypertrophic and discolored on the toes of both feet.     Vascular: DP & PT pulses are intact & regular on the right and left.   CFT and skin temperature is normal to the right and left lower extremity.     Neurologic: Lower extremity sensation is diminished to light touch.  No evidence of weakness in the right and left lower extremity.        Musculoskeletal: Patient is ambulatory without assistive device or brace.  No gross ankle deformity noted.  No foot or ankle joint effusion is noted.  Noted slightly contracted lesser toe hammertoes bilaterally.  Noted pain on palpation over the forefoot on the left with mild edema noted.           ASSESSMENT / PLAN:     ICD-10-CM    1. Type II diabetes mellitus with peripheral autonomic neuropathy (H)  E11.43 ciclopirox (PENLAC) 8 % external solution     Orthotics, Mastectomy and Custom Compression Orders      2. Pain in toes of both feet  M79.674 Orthopedic  Referral    M79.675       3. Acquired bilateral hammer toes  M20.41 Orthotics, Mastectomy and Custom Compression Orders    M20.42       4. Acute idiopathic gout involving toe of left foot  M10.072       5. Pain due to onychomycosis of toenails of both feet  B35.1 " ciclopirox (PENLAC) 8 % external solution    M79.675     M79.672           I have explained to Bi about the conditions.  We discussed the underlying contributing factors to the condition as well as both conservative and surgical treatment options along with expected length of recovery.  At this time, The patient was referred to [facility] for 1 pair of diabetic extra-depth shoes and 3 pair of accommodative inserts that will aid in offloading the pressure forces to the soft tissues and prevent further risk of ulceration and amputation.  The patient was also prescribed Penlac 8% antifungal solution to be applied to the affected toenails daily for up to 6 to 9 months.      Bi verbalized agreement with and understanding of the rational for the diagnosis and treatment plan.  All questions were answered to best of my ability and the patient's satisfaction. The patient was advised to contact the clinic with any questions that may arise after the clinic visit.      Disclaimer: This note consists of symbols derived from keyboarding, dictation and/or voice recognition software. As a result, there may be errors in the script that have gone undetected. Please consider this when interpreting information found in this chart.       CONSTANTINO Rapp D.P.M., MK.F.A.S.

## 2024-03-29 NOTE — Clinical Note
3/29/2024         RE: Bi Bishop  [address] [facility] [address]        Dear Colleague,    Thank you for referring your patient, Bi Bishop, to the [facility] [place]. Please see a copy of my visit note below.    PATIENT HISTORY:  Bi Bishop is a 63 year old male who presents to clinic {FSOC CONSULT:041756} with a chief complaint of ***.  The patient is seen {sjaparent:145210}.  The patient relates the pain is primarily located around the ***.  {Precipitating Event:453001}  The patient relates that the symptoms have been going on for several {DAYS:908969}.  The patient has previously tried different shoes, *** with little relief.  The patient is {FSOCWORK:535928}.  Any previous notes and studies that pertain to the patient's condition were reviewed.    Pertinent medical, surgical and family history was reviewed in the Epic chart.    Past Medical History:   Past Medical History:   Diagnosis Date    Allergic urticaria 01/15/2008    Hypertension goal BP (blood pressure) < 130/80 04/21/2011    Obesity, unspecified 01/15/2008    Uncomplicated asthma     Uncontrolled type 2 diabetes mellitus without complication, without long-term current use of insulin 01/07/2017       Medications:   Current Outpatient Medications:     albuterol (PROAIR HFA/PROVENTIL HFA/VENTOLIN HFA) 108 (90 Base) MCG/ACT inhaler, INHALE 2 PUFFS INTO THE LUNGS EVERY 6 HOURS AS NEEDED FOR SHORTNESS OF BREATH/DYSPNEA OR WHEEZING, Disp: 25.5 g, Rfl: 1    amLODIPine (NORVASC) 10 MG tablet, Take 1 tablet (10 mg) by mouth daily For blood pressure., Disp: 90 tablet, Rfl: 1    amLODIPine (NORVASC) 10 MG tablet, TAKE 1 TABLET BY MOUTH EVERY DAY, Disp: 90 tablet, Rfl: 1    Ascorbic Acid (VITAMIN C CR PO), Take 1,000 mg by mouth daily., Disp: , Rfl:     aspirin 81 MG chewable tablet, Take 81 mg by mouth daily., Disp: , Rfl:     blood glucose (NO BRAND SPECIFIED) test strip, Use to test blood sugar 1-3 times daily or  as directed., Disp: 300 strip, Rfl: 1    blood glucose monitoring (NO BRAND SPECIFIED) meter device kit, Use to test blood sugar 1-3 times daily or as directed., Disp: 1 kit, Rfl: 0    cetirizine (ZYRTEC) 10 MG tablet, Take 10 mg by mouth daily., Disp: , Rfl:     Cholecalciferol (VITAMIN D) 1000 UNIT capsule, Take 1 capsule by mouth daily , Disp: , Rfl:     empagliflozin (JARDIANCE) 10 MG TABS tablet, Take 1 tablet (10 mg) by mouth every morning, Disp: 90 tablet, Rfl: 1    fluticasone-salmeterol (ADVAIR) 250-50 MCG/ACT inhaler, Inhale 1 puff into the lungs 2 times daily INHALE 1 PUFF INTO THE LUNGS EVERY 12 HOURS, Disp: 180 each, Rfl: 3    KLOR-CON 20 MEQ CR tablet, TAKE 1 TABLET BY MOUTH DAILY, Disp: 90 tablet, Rfl: 3    losartan (COZAAR) 100 MG tablet, Take 1 tablet (100 mg) by mouth daily For blood pressure., Disp: 90 tablet, Rfl: 1    losartan (COZAAR) 100 MG tablet, TAKE 1 TABLET BY MOUTH EVERY DAY, Disp: 90 tablet, Rfl: 1    Melatonin 10 MG TABS tablet, Take 20 mg by mouth nightly as needed for sleep, Disp: , Rfl:     meloxicam (MOBIC) 15 MG tablet, Take 1 tablet (15 mg) by mouth daily, Disp: 30 tablet, Rfl: 1    metFORMIN (GLUCOPHAGE XR) 500 MG 24 hr tablet, Take 2 tablets (1,000 mg) by mouth 2 times daily (with meals), Disp: 360 tablet, Rfl: 1    metFORMIN (GLUCOPHAGE XR) 500 MG 24 hr tablet, TAKE 2 TABLETS BY MOUTH 2 TIMES DAILY WITH MEALS, Disp: 360 tablet, Rfl: 1    metoprolol succinate ER (TOPROL XL) 50 MG 24 hr tablet, TAKE 1 TABLET (50 MG) BY MOUTH DAILY FOR BLOOD PRESSURE., Disp: 90 tablet, Rfl: 0    MULTI-VITAMIN OR, Once daily, Disp: , Rfl:     Omega-3 Fatty Acids (FISH OIL PO), Take  by mouth 2 times daily., Disp: , Rfl:     omeprazole (PRILOSEC) 20 MG capsule, Take 20 mg by mouth daily., Disp: , Rfl:     simvastatin (ZOCOR) 20 MG tablet, TAKE 1 TABLET BY MOUTH EVERYDAY AT BEDTIME. For cholesterol., Disp: 90 tablet, Rfl: 1    simvastatin (ZOCOR) 20 MG tablet, TAKE 1 TABLET BY MOUTH EVERYDAY AT  BEDTIME, Disp: 90 tablet, Rfl: 1    tamsulosin (FLOMAX) 0.4 MG capsule, Take 1 capsule (0.4 mg) by mouth At Bedtime, Disp: 90 capsule, Rfl: 3    tiZANidine (ZANAFLEX) 4 MG tablet, Take 1 tablet (4 mg) by mouth 3 times daily as needed for muscle spasms, Disp: 30 tablet, Rfl: 1     Allergies:    Allergies   Allergen Reactions    Cats     Grass     Hazelnut (Filbert)        Vitals: There were no vitals taken for this visit.  BMI= There is no height or weight on file to calculate BMI.    LOWER EXTREMITY PHYSICAL EXAM    Dermatologic: Skin is intact to {RIGHT /LEFT:911838} lower extremity without significant lesions, rash or abrasion.        Vascular: DP & PT pulses are intact & regular on the {RIGHT /LEFT:180828}.   CFT and skin temperature is normal to the {RIGHT /LEFT:469908} lower extremity.     Neurologic: Lower extremity sensation is intact to light touch.  No evidence of weakness in the {RIGHT /LEFT:993327} lower extremity.        Musculoskeletal: Patient is ambulatory without assistive device or brace.  No gross ankle deformity noted.  No foot or ankle joint effusion is noted.  Noted ***    Diagnostics:  Radiographs included three views of the {RIGHT LEFT BOTH NO:382687} foot demonstrating *** no cortical erosions or periosteal elevation.  All joint margins appear stable.  There is no apparent fracture or tumor formation noted.  There is no evidence of foreign body.  The images were independently reviewed by myself along with the patient explaining the findings.      ASSESSMENT / PLAN:   No diagnosis found.    I have explained to Bi about the conditions.  We discussed the underlying contributing factors to the condition as well as both conservative and surgical treatment options along with expected length of recovery.  At this time, ***    Bi verbalized agreement with and understanding of the rational for the diagnosis and treatment plan.  All questions were answered to best of my ability and the  patient's satisfaction. The patient was advised to contact the clinic with any questions that may arise after the clinic visit.      Disclaimer: This note consists of symbols derived from keyboarding, dictation and/or voice recognition software. As a result, there may be errors in the script that have gone undetected. Please consider this when interpreting information found in this chart.       CONSTANTINO Rapp D.P.M., F.SELENA.C.F.A.S.        Again, thank you for allowing me to participate in the care of your patient.        Sincerely,        Oscar Rapp DPM

## 2024-03-29 NOTE — PATIENT INSTRUCTIONS
"DIABETES AND YOUR FEET  Diabetes can result in several problems in the feet including ulcers (open sores) and amputations. Two of the most important reasons why people develop foot problems when they have diabetes is : 1. Neuropathy (loss of feeling)  2. Vascular disease (loss or decrease of blood flow).    Neuropathy is a term used to describe a loss of nerve function.  Patients with diabetes are at risk of developing neuropathy if their sugars continue to run high and are above the normal value. One theory for neuropathy is that the \"extra\" sugar in the body enters the nerves and is broken down. These by-products build up in the nerve causing it to swell and impairing nerve function. Often times, this can be prevented by controlling your sugars, dieting and exercise.    When a person develops neuropathy, they usually begin to feel numbness or tingling in their feet and sometime in their legs.  Other symptoms may include painful burning or hot feet, tingling or feeling like insects or ants are crawling on your feet or legs.  If the diabetes is sever and the sugars run high for long periods of time, neuropathy can also occur in the hands.    Vascular disease  is a term used to describe a loss or decrease in circulation (blood flow). There is a problem in getting blood and oxygen to areas that need it. Similar to neuropathy, sugars can build up in the walls of the arteries (blood vessels) and cause them to become swollen, thickened and hardened. This decreases the amount of blood that can go to an area that needs it. Though this is common in the legs of diabetic patients, it can also affect other arteries (blood vessels) in the body such as in the heart and eyes.    In the legs, vascular disease usually results in cramping. Patients who develop leg cramps after walking the same distance every time (i.e. One block, half a mile, ect.) need to let their doctors know so that their circulation may be checked. Cramps " "causing severe pain in the feet and/or legs while sleeping and the cramps go away when you stand or hang your legs off the side of the bed, may also be a sign of poor blood circulation.  Occasional cramping in cold weather or on rare occasions with activity may not be due to poor circulation, but you should inform your doctor.    PREVENTION OF THESE DISEASES  The key to prevention is good blood sugar control. Poor blood sugar control is a big reason many of these problems start. Physical activity (exercise) is a very good way to help decrease your blood sugars. Exercise can lower your blood sugar, blood pressure, and cholesterol. It also reduces your risk for heart disease and stroke, relieves stress, and strengthens your heart, muscles and bones.  In addition, regular activity helps insulin work better, improves your blood circulation, and keeps your joints flexible. If you're trying to lose weight, a combination of exercise and wise food choices can help you reach your target weight and maintain it.      PAIN MANAGEMENT (**Please speak with your primary doctor about any medications**)  1.Blood Sugar Control - Most important  2. Medications such as:  Amytriptylline, duloxetine, gabapentin, lyrica, tramadol (talk with your primary care doctor about this).     NUTRITION:  Nutrition is also important to help with healing. If your body does not have what it needs, it can't heal.   Increasing your protein intake is important.  With wounds you need 60-90gm of protein a day to help with healing. Over the counter protein shakes such as Giovanni, Glucerna, Ensure, ect... can help to supplement your daily protein intake.   It is also important to take Vitamins to help with healing.  Vitamins such as B12, B6 and Vitamin D3 are important for healing. These can be gotten over the counter at pharmacies or at stores like [employer] or the [employer].    I can also prescribe a dietary supplement called \"Rheumate\" that has a lot of " essential vitamins in one capsule.  This may not be covered by insurance though.     FOOT CARE RECOMMENDATIONS   1. Wash your feet with lukewarm water and a mild soap and then dry them thoroughly, especially between the toes.     2. Examine your feet daily looking for cuts, corns, blisters, cracks, ect, especially after wearing new shoes. Make sure to look between your toes. If you cannot see the bottom of your feet, set a mirror on the floor and hold your foot over it, or ask a spouse, friend or family member to examine your feet for you. Contact your doctor immediately if new problems are noted or if sores are not healing.     3. Immediately apply moisturizer to the tops and bottoms of your feet, avoiding areas between the toes. Hand lotion (Intesive Care, Mary, Eucerin, Neutrogena, Curel, ect) is sufficient unless your doctor prescribes a medicated lotion. Apply sunscreen to your feet when going swimming outside.     4. Use clean comfortable shoes, wear white socks (if you have any bleeding or drainage, you will see it on white socks). Socks should not have thick seams or cut off the circulation around the leg. Break in new shoes slowly and rotate with older shoes until broken in. Check the inside of your shoes with your hand to look for areas of irritation or objects that may have fallen into your shoes.       5. Keep slippers by the side of your bed for use during the night.     6.  Shoes should be fitted by a professional and should not cause areas of irritation.  Check your feet regularly when wearing a new pair of shoes and replace them as needed.     7.  Talk to your doctor about proper exercise. Exercise and stretching stimulate blood flow to your feet and maintain proper glucose levels.     8.  Monitor your blood glucose level as instructed by your doctor. Notify your doctor immediately if your blood sugar is abnormally high or low.    9. Cut your nails straight across, but then gently round any sharp  edges with a cardboard nail file. If you have neuropathy, peripheral vascular disease or cannot see that well to trim your own toenails contact [facility] ([phone]) or [facility] ([phone]).      THINGS TO AVOID DOING   1.  Do not soak your feet if you have an open sore. Use only lukewarm water and always check the temperature with your hand as hot water can easily burn your feet.       2.  Never use a hot water bottle or heating pad on your feet. Also do not apply cold compresses to your feet. With decreased sensation, you could burn or freeze your feet.       3.  Do not apply any of these to your feet:    -  Over the counter medicine for corns or warts    -  Harsh chemicals like boric acid    -  Do not self-treat corns, cuts, blisters or infections. Always consult your doctor.       4.  Do not wear sandals, slippers or walk barefoot, especially on hot sand or concrete or other harsh surfaces.     5.  If you smoke, stop!!!

## 2024-03-29 NOTE — NURSING NOTE
"Chief Complaint   Patient presents with    Consult     Bilateral feet- burning at night- pain in great toes- possible gout?  Great right toenail discolored and cracked       Initial BP (!) 169/94   Pulse 78   Ht 1.905 m (6' 3\")   Wt 146.1 kg (322 lb)   BMI 40.25 kg/m   Estimated body mass index is 40.25 kg/m  as calculated from the following:    Height as of this encounter: 1.905 m (6' 3\").    Weight as of this encounter: 146.1 kg (322 lb).  Medications and allergies reviewed.      Jenniefr MATAMOROS MA    "

## 2024-04-01 ENCOUNTER — TELEPHONE (OUTPATIENT)
Dept: FAMILY MEDICINE | Facility: CLINIC | Age: 64
End: 2024-04-01
Payer: COMMERCIAL

## 2024-04-01 NOTE — TELEPHONE ENCOUNTER
Patient reached out again requesting information on his medication. Please call him back at [phone]. Thank you

## 2024-04-12 ENCOUNTER — OFFICE VISIT (OUTPATIENT)
Dept: OPTOMETRY | Facility: CLINIC | Age: 64
End: 2024-04-12
Attending: FAMILY MEDICINE
Payer: COMMERCIAL

## 2024-04-12 ENCOUNTER — TELEPHONE (OUTPATIENT)
Dept: FAMILY MEDICINE | Facility: CLINIC | Age: 64
End: 2024-04-12

## 2024-04-12 DIAGNOSIS — E11.22 CONTROLLED TYPE 2 DIABETES MELLITUS WITH STAGE 3 CHRONIC KIDNEY DISEASE, WITHOUT LONG-TERM CURRENT USE OF INSULIN (H): Primary | ICD-10-CM

## 2024-04-12 DIAGNOSIS — H02.834 DERMATOCHALASIS OF BOTH UPPER EYELIDS: ICD-10-CM

## 2024-04-12 DIAGNOSIS — N18.30 CONTROLLED TYPE 2 DIABETES MELLITUS WITH STAGE 3 CHRONIC KIDNEY DISEASE, WITHOUT LONG-TERM CURRENT USE OF INSULIN (H): Primary | ICD-10-CM

## 2024-04-12 DIAGNOSIS — H52.03 HYPEROPIA, BILATERAL: ICD-10-CM

## 2024-04-12 DIAGNOSIS — H52.221 REGULAR ASTIGMATISM OF RIGHT EYE: ICD-10-CM

## 2024-04-12 DIAGNOSIS — H02.831 DERMATOCHALASIS OF BOTH UPPER EYELIDS: ICD-10-CM

## 2024-04-12 DIAGNOSIS — H25.13 AGE-RELATED NUCLEAR CATARACT OF BOTH EYES: ICD-10-CM

## 2024-04-12 PROCEDURE — 92004 COMPRE OPH EXAM NEW PT 1/>: CPT | Performed by: OPTOMETRIST

## 2024-04-12 PROCEDURE — 92015 DETERMINE REFRACTIVE STATE: CPT | Performed by: OPTOMETRIST

## 2024-04-12 ASSESSMENT — VISUAL ACUITY
OD_CC: 20/40-1
METHOD: SNELLEN - LINEAR
OD_SC+: -1
OS_CC: 20/25-2
OS_SC+: -1
OS_SC: 20/30
OD_SC: 20/30

## 2024-04-12 ASSESSMENT — CONF VISUAL FIELD
OD_SUPERIOR_NASAL_RESTRICTION: 0
OS_NORMAL: 1
OD_INFERIOR_TEMPORAL_RESTRICTION: 0
OD_SUPERIOR_TEMPORAL_RESTRICTION: 0
OS_INFERIOR_NASAL_RESTRICTION: 0
OD_INFERIOR_NASAL_RESTRICTION: 0
OS_SUPERIOR_TEMPORAL_RESTRICTION: 0
OS_INFERIOR_TEMPORAL_RESTRICTION: 0
OD_NORMAL: 1
METHOD: COUNTING FINGERS
OS_SUPERIOR_NASAL_RESTRICTION: 0

## 2024-04-12 ASSESSMENT — REFRACTION_MANIFEST
OS_SPHERE: +1.00
OS_CYLINDER: SPHERE
METHOD_AUTOREFRACTION: 1
OD_AXIS: 019
OD_AXIS: 015
OS_SPHERE: +0.50
OD_SPHERE: +0.25
OD_ADD: +2.50
OS_ADD: +2.50
OD_CYLINDER: +0.50
OD_SPHERE: +0.50
OD_CYLINDER: +0.50

## 2024-04-12 ASSESSMENT — EXTERNAL EXAM - RIGHT EYE: OD_EXAM: NORMAL

## 2024-04-12 ASSESSMENT — REFRACTION_WEARINGRX
SPECS_TYPE: OTC'S
OS_SPHERE: +1.75
OD_SPHERE: +1.75

## 2024-04-12 ASSESSMENT — KERATOMETRY
OS_K1POWER_DIOPTERS: 42.00
OD_AXISANGLE2_DEGREES: 177
OD_K1POWER_DIOPTERS: 42.50
OS_K2POWER_DIOPTERS: 42.75
OS_AXISANGLE2_DEGREES: 171
OD_K2POWER_DIOPTERS: 43.00

## 2024-04-12 ASSESSMENT — TONOMETRY
OD_IOP_MMHG: 14
OS_IOP_MMHG: 16
IOP_METHOD: APPLANATION

## 2024-04-12 ASSESSMENT — CUP TO DISC RATIO
OD_RATIO: 0.2
OS_RATIO: 0.2

## 2024-04-12 ASSESSMENT — SLIT LAMP EXAM - LIDS
COMMENTS: TRACE DERMATOCHALASIS
COMMENTS: TRACE DERMATOCHALASIS

## 2024-04-12 ASSESSMENT — EXTERNAL EXAM - LEFT EYE: OS_EXAM: NORMAL

## 2024-04-12 NOTE — PATIENT INSTRUCTIONS
Patient Education   Diabetes weakens the blood vessels all over the body, including the eyes. Damage to the blood vessels in the eyes can cause swelling or bleeding into part of the eye (called the retina). This is called diabetic retinopathy (HERIBERTO-tin-AH-pu-thee). If not treated, this disease can cause vision loss or blindness.   Symptoms may include blurred or distorted vision, but many people have no symptoms. It's important to see your eye doctor regularly to check for problems.   Early treatment and good control can help protect your vision. Here are the things you can do to help prevent vision loss:      1. Keep your blood sugar levels under tight control.      2. Bring high blood pressure under control.      3. No smoking.      4. Have yearly dilated eye exams.      Hyperopia is far-sightedness. Hyperopia is commonly rooted from a petite eye length. That results in the light's focus behind the retina.     Astigmatism results from curvature differential in the cornea and crystalline lens which can cause a distorted image, as light rays are prevented from meeting at a common focus.    Presbyopia is the diagnosis. Presbyopia is an age-related condition where the eye's crystalline lens doesn't change shape as easily as it once did.    You have the start of mild cataracts.  You may notice some blurred vision or glare with night driving.  It is important that you wear good sunglasses to protect your eyes from the ultraviolet light from the sun.  I recommend that you return in 1 year for an eye exam unless there are any sudden changes in your vision.       The affects of the dilating drops last for 4- 6 hours.  You will be more sensitive to light and vision will be blurry up close.  Do not drive if you do not feel comfortable.  Mydriatic sunglasses were given if needed.    Recommend annual eye exams.      WENDY Alston [facility]  [address] [place], [place]  [id number] [phone]

## 2024-04-12 NOTE — PROGRESS NOTES
Chief Complaint   Patient presents with    Diabetic Eye Exam     Accompanied by Wife   Chief Complaint(s) and History of Present Illness(es)       Diabetic Eye Exam              Vision: is stable    Diabetes Type: Type 2 and taking oral medications    Blood Sugars: is controlled                   Lab Results   Component Value Date    A1C 7.1 03/15/2024    A1C 6.0 12/01/2021    A1C 6.2 08/19/2020    A1C 6.6 11/13/2019    A1C 6.2 08/06/2019    A1C 5.6 05/24/2018    A1C 5.9 03/10/2017            Last Eye Exam: 4-5 years ago.   Dilated Previously: Yes    What are you currently using to see?  readers    Distance Vision Acuity: Satisfied with vision    Near Vision Acuity: Satisfied with vision while reading and using computer with readers. Thinks that the small print is getting a little harder     Eye Comfort: good  Do you use eye drops? : Yes: As needed. And notices that they feels better when he does use them   Occupation or Hobbies: Retired      Daksha Apple Optometric Assistant      Medical, surgical and family histories reviewed and updated 4/12/2024.       OBJECTIVE: See Ophthalmology exam    ASSESSMENT:    ICD-10-CM    1. Controlled type 2 diabetes mellitus with stage 3 chronic kidney disease, without long-term current use of insulin (H)  E11.22 Adult Eye  Referral    N18.30       2. Hyperopia, bilateral  H52.03       3. Regular astigmatism of right eye  H52.221       4. Age-related nuclear cataract of both eyes  H25.13           PLAN:    Bi Bishop aware  eye exam results will be sent to Paty Badillo.  Patient Instructions   Patient Education  Diabetes weakens the blood vessels all over the body, including the eyes. Damage to the blood vessels in the eyes can cause swelling or bleeding into part of the eye (called the retina). This is called diabetic retinopathy (HERIBERTO-tin-AH-puh-thee). If not treated, this disease can cause vision loss or blindness.   Symptoms may include blurred or  distorted vision, but many people have no symptoms. It's important to see your eye doctor regularly to check for problems.   Early treatment and good control can help protect your vision. Here are the things you can do to help prevent vision loss:      1. Keep your blood sugar levels under tight control.      2. Bring high blood pressure under control.      3. No smoking.      4. Have yearly dilated eye exams.      Hyperopia is far-sightedness. Hyperopia is commonly rooted from a petite eye length. That results in the light's focus behind the retina.     Astigmatism results from curvature differential in the cornea and crystalline lens which can cause a distorted image, as light rays are prevented from meeting at a common focus.    Presbyopia is the diagnosis. Presbyopia is an age-related condition where the eye's crystalline lens doesn't change shape as easily as it once did.    You have the start of mild cataracts.  You may notice some blurred vision or glare with night driving.  It is important that you wear good sunglasses to protect your eyes from the ultraviolet light from the sun.  I recommend that you return in 1 year for an eye exam unless there are any sudden changes in your vision.       The affects of the dilating drops last for 4- 6 hours.  You will be more sensitive to light and vision will be blurry up close.  Do not drive if you do not feel comfortable.  Mydriatic sunglasses were given if needed.    Recommend annual eye exams.      Madisyn Monsalve O.D.  [facility]  [address] [place], [place] [id number] [phone]

## 2024-04-12 NOTE — LETTER
4/12/2024         RE: Bi Bishop  [address] [facility] [address]        Dear Colleague,    Thank you for referring your patient, Bi Bishop, to the [facility]. Please see a copy of my visit note below.    Chief Complaint   Patient presents with     Diabetic Eye Exam     Accompanied by Wife   Chief Complaint(s) and History of Present Illness(es)       Diabetic Eye Exam              Vision: is stable    Diabetes Type: Type 2 and taking oral medications    Blood Sugars: is controlled                   Lab Results   Component Value Date    A1C 7.1 03/15/2024    A1C 6.0 12/01/2021    A1C 6.2 08/19/2020    A1C 6.6 11/13/2019    A1C 6.2 08/06/2019    A1C 5.6 05/24/2018    A1C 5.9 03/10/2017            Last Eye Exam: 4-5 years ago.   Dilated Previously: Yes    What are you currently using to see?  readers    Distance Vision Acuity: Satisfied with vision    Near Vision Acuity: Satisfied with vision while reading and using computer with readers. Thinks that the small print is getting a little harder     Eye Comfort: good  Do you use eye drops? : Yes: As needed. And notices that they feels better when he does use them   Occupation or Hobbies: Retired      Daksha Apple Optometric Assistant      Medical, surgical and family histories reviewed and updated 4/12/2024.       OBJECTIVE: See Ophthalmology exam    ASSESSMENT:    ICD-10-CM    1. Controlled type 2 diabetes mellitus with stage 3 chronic kidney disease, without long-term current use of insulin (H)  E11.22 Adult Eye  Referral    N18.30       2. Hyperopia, bilateral  H52.03       3. Regular astigmatism of right eye  H52.221       4. Age-related nuclear cataract of both eyes  H25.13           PLAN:    Bi Bishop aware  eye exam results will be sent to Paty Badillo.  Patient Instructions   Patient Education  Diabetes weakens the blood vessels all over the body, including the eyes. Damage to the blood  vessels in the eyes can cause swelling or bleeding into part of the eye (called the retina). This is called diabetic retinopathy (HERIBERTO-tin-AH-puh-thee). If not treated, this disease can cause vision loss or blindness.   Symptoms may include blurred or distorted vision, but many people have no symptoms. It's important to see your eye doctor regularly to check for problems.   Early treatment and good control can help protect your vision. Here are the things you can do to help prevent vision loss:      1. Keep your blood sugar levels under tight control.      2. Bring high blood pressure under control.      3. No smoking.      4. Have yearly dilated eye exams.      Hyperopia is far-sightedness. Hyperopia is commonly rooted from a petite eye length. That results in the light's focus behind the retina.     Astigmatism results from curvature differential in the cornea and crystalline lens which can cause a distorted image, as light rays are prevented from meeting at a common focus.    Presbyopia is the diagnosis. Presbyopia is an age-related condition where the eye's crystalline lens doesn't change shape as easily as it once did.    You have the start of mild cataracts.  You may notice some blurred vision or glare with night driving.  It is important that you wear good sunglasses to protect your eyes from the ultraviolet light from the sun.  I recommend that you return in 1 year for an eye exam unless there are any sudden changes in your vision.       The affects of the dilating drops last for 4- 6 hours.  You will be more sensitive to light and vision will be blurry up close.  Do not drive if you do not feel comfortable.  Mydriatic sunglasses were given if needed.    Recommend annual eye exams.      Madisyn Monsalve O.D.  [facility]  [address] [place], [place] [id number] [phone]             Again, thank you for allowing me to participate in the care of your patient.         Sincerely,        Madisyn Monsalve OD

## 2024-04-12 NOTE — TELEPHONE ENCOUNTER
Dr. Badillo,     Patient called stated colonoscopy prep RN told him to ask about holding Jardiance for procedure. It looks like best practice says stop taking at least three days before or do not take day of surgery.    Please advise.     Thanks,  JIMY Perez  [facility]

## 2024-04-15 NOTE — CONFIDENTIAL NOTE
Reviewed with patient by phone.  He is to continue taking his current medications.  Follow-up in 3 months for blood pressure check.

## 2024-04-15 NOTE — CONFIDENTIAL NOTE
Reviewed by phone with patient.  Advised to hold his diabetic medications, Jardiance and metformin, 24 hours prior to colonoscopy.  He may continue to take his blood pressure pills and simvastatin.

## 2024-04-16 ENCOUNTER — ANESTHESIA EVENT (OUTPATIENT)
Dept: GASTROENTEROLOGY | Facility: CLINIC | Age: 64
End: 2024-04-16
Payer: COMMERCIAL

## 2024-04-16 ASSESSMENT — LIFESTYLE VARIABLES: TOBACCO_USE: 0

## 2024-04-16 NOTE — ANESTHESIA PREPROCEDURE EVALUATION
Anesthesia Pre-Procedure Evaluation    Patient: Bi Bishop   MRN: [id number] : 1960        Procedure : Procedure(s):  Colonoscopy          Past Medical History:   Diagnosis Date    Allergic urticaria 01/15/2008    Hypertension goal BP (blood pressure) < 130/80 2011    Obesity, unspecified 01/15/2008    Uncomplicated asthma     Uncontrolled type 2 diabetes mellitus without complication, without long-term current use of insulin 2017      Past Surgical History:   Procedure Laterality Date    COLONOSCOPY  2011    Procedure:COMBINED COLONOSCOPY, REMOVE TUMOR/POLYP/LESION BY SNARE; Surgeon:BRIANNA TAPIA; Location:[facility]    EXTRACORPOREAL SHOCK WAVE LITHOTRIPSY, CYSTOSCOPY, INSERT STENT URETER(S), COMBINED Right 2021    Procedure: LITHOTRIPSY,RIGHT  EXTRACORPOREAL SHOCK WAVE (ESWL), WITH  CYSTOSCOPY AND URETERAL STENT INSERTION;  Surgeon: Jatinder Rolle MD;  Location: [facility]      Allergies   Allergen Reactions    Cats     Grass     Hazelnut (Filbert)       Social History     Tobacco Use    Smoking status: Never    Smokeless tobacco: Never   Substance Use Topics    Alcohol use: Yes     Comment: maybe 1-2 beers max once a week (after bowling)      Wt Readings from Last 1 Encounters:   24 146.1 kg (322 lb)        Anesthesia Evaluation   Pt has had prior anesthetic. Type: MAC and General.        ROS/MED HX  ENT/Pulmonary:     (+)                      asthma               (-) tobacco use   Neurologic:       Cardiovascular: Comment: LVH (left ventricular hypertrophy)    (+) Dyslipidemia hypertension- -   -  - -                                      METS/Exercise Tolerance:     Hematologic:       Musculoskeletal:       GI/Hepatic:     (+) GERD,                   Renal/Genitourinary:     (+)       Nephrolithiasis ,       Endo:     (+)  type II DM,             Obesity,       Psychiatric/Substance Use:       Infectious Disease:       Malignancy:       Other:            Physical  "Exam    Airway  airway exam normal      Mallampati: II   TM distance: > 3 FB   Neck ROM: full   Mouth opening: > 3 cm    Respiratory Devices and Support         Dental       (+) Modest Abnormalities - crowns, retainers, 1 or 2 missing teeth      Cardiovascular   cardiovascular exam normal          Pulmonary   pulmonary exam normal                OUTSIDE LABS:  CBC:   Lab Results   Component Value Date    WBC 6.5 03/15/2024    WBC 6.2 09/10/2021    HGB 14.4 03/15/2024    HGB 14.6 09/10/2021    HCT 41.1 03/15/2024    HCT 42.3 09/10/2021     03/15/2024     09/10/2021     BMP:   Lab Results   Component Value Date     03/15/2024     12/01/2021    POTASSIUM 3.8 03/15/2024    POTASSIUM 3.4 12/01/2021    CHLORIDE 100 03/15/2024    CHLORIDE 103 12/01/2021    CO2 30 (H) 03/15/2024    CO2 31 12/01/2021    BUN 18.5 03/15/2024    BUN 20 12/01/2021    CR 1.41 (H) 03/15/2024    CR 1.00 12/01/2021     (H) 03/15/2024     (H) 12/16/2021     COAGS: No results found for: \"PTT\", \"INR\", \"FIBR\"  POC:   Lab Results   Component Value Date     (H) 10/21/2010     HEPATIC:   Lab Results   Component Value Date    ALBUMIN 4.4 03/15/2024    PROTTOTAL 6.9 03/15/2024    ALT 31 03/15/2024    AST 26 03/15/2024    ALKPHOS 76 03/15/2024    BILITOTAL 0.5 03/15/2024     OTHER:   Lab Results   Component Value Date    PH 7.40 10/19/2010    A1C 7.1 (H) 03/15/2024    JOEY 8.8 03/15/2024    TSH 1.42 03/22/2024       Anesthesia Plan    ASA Status:  4       Anesthesia Type: General.     - Airway: ETT   Induction: Intravenous.           Consents    Anesthesia Plan(s) and associated risks, benefits, and realistic alternatives discussed. Questions answered and patient/representative(s) expressed understanding.     - Discussed: Risks, Benefits and Alternatives for BOTH SEDATION and the PROCEDURE were discussed     - Discussed with:  Patient            Postoperative Care       PONV prophylaxis: Background Propofol " "Infusion     Comments:               LIANNE Betancourt CRNA    I have reviewed the pertinent notes and labs in the chart from the past 30 days and (re)examined the patient.  Any updates or changes from those notes are reflected in this note.              # DMII: A1C = 7.1 % (Ref range: 0.0 - 5.6 %) within past 6 months  # Severe Obesity: Estimated body mass index is 40.25 kg/m  as calculated from the following:    Height as of 3/29/24: 1.905 m (6' 3\").    Weight as of 3/29/24: 146.1 kg (322 lb).      "

## 2024-04-18 ENCOUNTER — HOSPITAL ENCOUNTER (OUTPATIENT)
Facility: CLINIC | Age: 64
Discharge: HOME | End: 2024-04-18
Attending: STUDENT IN AN ORGANIZED HEALTH CARE EDUCATION/TRAINING PROGRAM | Admitting: STUDENT IN AN ORGANIZED HEALTH CARE EDUCATION/TRAINING PROGRAM
Payer: COMMERCIAL

## 2024-04-18 ENCOUNTER — ANESTHESIA (OUTPATIENT)
Dept: GASTROENTEROLOGY | Facility: CLINIC | Age: 64
End: 2024-04-18
Payer: COMMERCIAL

## 2024-04-18 VITALS
BODY MASS INDEX: 39.17 KG/M2 | HEART RATE: 77 BPM | RESPIRATION RATE: 16 BRPM | DIASTOLIC BLOOD PRESSURE: 85 MMHG | SYSTOLIC BLOOD PRESSURE: 145 MMHG | HEIGHT: 75 IN | OXYGEN SATURATION: 93 % | TEMPERATURE: 98 F | WEIGHT: 315 LBS

## 2024-04-18 LAB — COLONOSCOPY: NORMAL

## 2024-04-18 PROCEDURE — 250N000011 HC RX IP 250 OP 636: Performed by: NURSE ANESTHETIST, CERTIFIED REGISTERED

## 2024-04-18 PROCEDURE — 250N000009 HC RX 250: Performed by: NURSE ANESTHETIST, CERTIFIED REGISTERED

## 2024-04-18 PROCEDURE — 45380 COLONOSCOPY AND BIOPSY: CPT | Performed by: STUDENT IN AN ORGANIZED HEALTH CARE EDUCATION/TRAINING PROGRAM

## 2024-04-18 PROCEDURE — 45385 COLONOSCOPY W/LESION REMOVAL: CPT | Mod: PT | Performed by: STUDENT IN AN ORGANIZED HEALTH CARE EDUCATION/TRAINING PROGRAM

## 2024-04-18 PROCEDURE — 258N000003 HC RX IP 258 OP 636: Performed by: STUDENT IN AN ORGANIZED HEALTH CARE EDUCATION/TRAINING PROGRAM

## 2024-04-18 PROCEDURE — 88305 TISSUE EXAM BY PATHOLOGIST: CPT | Mod: 26 | Performed by: PATHOLOGY

## 2024-04-18 PROCEDURE — 370N000017 HC ANESTHESIA TECHNICAL FEE, PER MIN: Performed by: STUDENT IN AN ORGANIZED HEALTH CARE EDUCATION/TRAINING PROGRAM

## 2024-04-18 PROCEDURE — 88305 TISSUE EXAM BY PATHOLOGIST: CPT | Mod: TC | Performed by: STUDENT IN AN ORGANIZED HEALTH CARE EDUCATION/TRAINING PROGRAM

## 2024-04-18 RX ORDER — LIDOCAINE 40 MG/G
CREAM TOPICAL
Status: DISCONTINUED | OUTPATIENT
Start: 2024-04-18 | End: 2024-04-18 | Stop reason: HOSPADM

## 2024-04-18 RX ORDER — SODIUM CHLORIDE, SODIUM LACTATE, POTASSIUM CHLORIDE, CALCIUM CHLORIDE 600; 310; 30; 20 MG/100ML; MG/100ML; MG/100ML; MG/100ML
INJECTION, SOLUTION INTRAVENOUS CONTINUOUS
Status: DISCONTINUED | OUTPATIENT
Start: 2024-04-18 | End: 2024-04-18 | Stop reason: HOSPADM

## 2024-04-18 RX ORDER — GLYCOPYRROLATE 0.2 MG/ML
INJECTION, SOLUTION INTRAMUSCULAR; INTRAVENOUS PRN
Status: DISCONTINUED | OUTPATIENT
Start: 2024-04-18 | End: 2024-04-18

## 2024-04-18 RX ORDER — PROPOFOL 10 MG/ML
INJECTION, EMULSION INTRAVENOUS PRN
Status: DISCONTINUED | OUTPATIENT
Start: 2024-04-18 | End: 2024-04-18

## 2024-04-18 RX ORDER — KETAMINE HYDROCHLORIDE 10 MG/ML
INJECTION INTRAMUSCULAR; INTRAVENOUS PRN
Status: DISCONTINUED | OUTPATIENT
Start: 2024-04-18 | End: 2024-04-18

## 2024-04-18 RX ADMIN — GLYCOPYRROLATE 0.2 MG: 0.2 INJECTION, SOLUTION INTRAMUSCULAR; INTRAVENOUS at 12:30

## 2024-04-18 RX ADMIN — PROPOFOL 150 MG: 10 INJECTION, EMULSION INTRAVENOUS at 12:37

## 2024-04-18 RX ADMIN — PROPOFOL 50 MG: 10 INJECTION, EMULSION INTRAVENOUS at 12:38

## 2024-04-18 RX ADMIN — PROPOFOL 100 MG: 10 INJECTION, EMULSION INTRAVENOUS at 13:01

## 2024-04-18 RX ADMIN — PROPOFOL 150 MG: 10 INJECTION, EMULSION INTRAVENOUS at 12:35

## 2024-04-18 RX ADMIN — PROPOFOL 150 MG: 10 INJECTION, EMULSION INTRAVENOUS at 12:36

## 2024-04-18 RX ADMIN — KETAMINE HYDROCHLORIDE 50 MG: 10 INJECTION INTRAMUSCULAR; INTRAVENOUS at 12:35

## 2024-04-18 RX ADMIN — SODIUM CHLORIDE, POTASSIUM CHLORIDE, SODIUM LACTATE AND CALCIUM CHLORIDE: 600; 310; 30; 20 INJECTION, SOLUTION INTRAVENOUS at 12:16

## 2024-04-18 ASSESSMENT — ACTIVITIES OF DAILY LIVING (ADL)
ADLS_ACUITY_SCORE: 35

## 2024-04-18 NOTE — LETTER
Bi Bishop  [address] [place] [id number]      April 23, 2024    Dear Bi,  This letter is written to inform you of the results of your recent colonoscopy.  Your examination showed polyp(s) in your descending colon, sigmoid colon, and rectum. All polyps were removed in their entirety and sent for review by a pathologist. As you will see on the pathology report below, the tissue(s) were tubular adenomatous polyps, hyperplastic polyps, and polyps consistent with tubulovillous adenoma. Your examination was otherwise without abnormality.    Adenomatous polyps are entirely benign (non-cancerous); however, patients who have developed these polyps are at an increased risk for developing additional polyps in the future. If these are not eventually removed, there is a risk of developing colon cancer. We will advise more frequent examinations with you because of the risk associated with this type of polyp.  Hyperplastic polyps are entirely benign (non-cancerous) and rarely associated with the development of additional polyps or colorectal cancer.    Given these findings, as well as the size of the polyps, I recommend that you undergo a repeat colonoscopy in 3 year(s) for surveillance. We will enter you into a recall system so you receive a reminder closer to the time that you are due for repeat examination. Your physician also recommends that you adhere to a high fiber diet indefinitely to promote colon health.     Please remember that this recommendation is made with the understanding that you are not experiencing persistent changes in bowel function, bleeding per rectum, and/or significant abdominal pain. If you experience these symptoms, please contact your primary care provider for a further evaluation.     If you have any questions or concerns about the results of your colonoscopy or the appropriate follow-up, please contact my assistant at [phone]    Sincerely,      Velasquez Hess MD  "  [facility]  ___                    Resulted Orders   Surgical Pathology Exam   Result Value Ref Range    Case Report       Surgical Pathology Report                         Case: [id number]                                  Authorizing Provider:  Velasquez Hess MD       Collected:           04/18/2024 12:47 PM          Ordering Location:     [facility]   Received:            04/18/2024 01:24 PM                                 [place]                                                                      Pathologist:           Carlos Siddiqui MD                                                            Specimens:   A) - Large Intestine, Colon, Hepatic Flexure                                                        B) - Large Intestine, Colon, Sigmoid, x3 polyps                                                     C) - Rectum                                                                                Final Diagnosis       A. Colon, hepatic flexure, polypectomy:  -- Tubular adenoma.    B.  Colon, sigmoid, polypectomies:  -- Tubulovillous adenoma with high-grade dysplasia.  -- Additional fragments of hyperplastic polyp.    C. Rectum, polypectomy:  -- Hyperplastic polyp.          Clinical Information       Procedure:  COLONOSCOPY, WITH POLYPECTOMY AND BIOPSY  Pre-op Diagnosis: Colon cancer screening [Z12.11]  Post-op Diagnosis: Z12.11 - Colon cancer screening [ICD-10-CM]      Gross Description       A(1). Large Intestine, Colon, Hepatic Flexure, :  The specimen is received in formalin, labeled with the patient's name, medical record number and other identifying information designated \"hepatic flexure polyp\". It consists of a single 0.4 cm tan-pink soft tissue fragment.  Entirely submitted in 1 cassette.    B(2). Large Intestine, Colon, Sigmoid, x3 polyps:  The specimen is received in formalin, labeled with the patient's name, medical record number and other identifying information " "designated \"sigmoid colon polyp x 3\". It consists of a 1.7 x 1.4 x 0.7 cm tan-brown friable pedunculated tissue fragment with a 0.3 cm stalk.  Additionally received in the specimen container are 2 tan-pink soft tissue fragments, 0.9 and 1.1 cm.  Differentially inked, sectioned, and entirely submitted as follows:    B1-B2-1 tissue fragment, inked black  B3-2 tissue fragments, 1 fragment inked black and 1 fragment inked blue    C(3). Rectum, :  The specimen is received in formalin, labeled with the patient's name, medical record number and other identifying information designated \"rectal polyp\". It consists of a single 1.0 cm tan soft tissue fragment.  Inked black, sectioned, and entirely submitted in 1 cassette.  (Sandra Bello, Biopsy Tech)      Microscopic Description       Microscopic examination was performed.        Performing Labs       The technical component of this testing was completed at [facility]      Case Images       "

## 2024-04-18 NOTE — ANESTHESIA CARE TRANSFER NOTE
Patient: Bi Bishop    Procedure: Procedure(s):  COLONOSCOPY, WITH POLYPECTOMY AND BIOPSY       Diagnosis: Colon cancer screening [Z12.11]  Diagnosis Additional Information: No value filed.    Anesthesia Type:   General     Note:    Oropharynx: oropharynx clear of all foreign objects and spontaneously breathing  Level of Consciousness: awake  Oxygen Supplementation: room air    Independent Airway: airway patency satisfactory and stable  Dentition: dentition unchanged  Vital Signs Stable: post-procedure vital signs reviewed and stable  Report to RN Given: handoff report given  Patient transferred to: Phase II    Handoff Report: Identifed the Patient, Identified the Reponsible Provider, Reviewed the pertinent medical history, Discussed the surgical course, Reviewed Intra-OP anesthesia mangement and issues during anesthesia, Set expectations for post-procedure period and Allowed opportunity for questions and acknowledgement of understanding    Vitals:  Vitals Value Taken Time   BP     Temp     Pulse     Resp     SpO2         Electronically Signed By: LIANNE Koch CRNA  April 18, 2024  1:15 PM

## 2024-04-18 NOTE — ANESTHESIA POSTPROCEDURE EVALUATION
Patient: Bi Bishop    Procedure: Procedure(s):  COLONOSCOPY, WITH POLYPECTOMY AND BIOPSY       Anesthesia Type:  General    Note:  Disposition: Outpatient   Postop Pain Control: Uneventful            Sign Out: Well controlled pain   PONV: No   Neuro/Psych: Uneventful            Sign Out: Acceptable/Baseline neuro status   Airway/Respiratory: Uneventful            Sign Out: Acceptable/Baseline resp. status   CV/Hemodynamics: Uneventful            Sign Out: Acceptable CV status; No obvious hypovolemia; No obvious fluid overload   Other NRE: NONE   DID A NON-ROUTINE EVENT OCCUR? No           Last vitals:  Vitals:    04/18/24 1150   BP: (!) 179/109   Pulse: 78   Resp: 17   Temp: 36.7  C (98  F)   SpO2: 96%       Electronically Signed By: LIANNE Koch CRNA  April 18, 2024  1:16 PM

## 2024-04-18 NOTE — H&P
[facility]    Pre-Endoscopy History and Physical     Bi Bishop MRN# [id number]   YOB: 1960 Age: 63 year old     Date of Procedure: (Not on file)  Primary care provider: Paty Badillo  Type of Endoscopy: Colonoscopy with possible biopsy, possible polypectomy  Reason for Procedure: Screen for colon cancer  Type of Anesthesia Anticipated: MAC    HPI:    Bi is a 63 year old male who will be undergoing the above procedure.  Last colonoscopy 2011 with diverticulosis and 1 polyp. No symptoms reported. No anticoagulation use. No family history of colorectal cancer.     A history and physical has been performed. The patient's medications and allergies have been reviewed. The risks and benefits of the procedure and the sedation options and risks were discussed with the patient.  All questions were answered and informed consent was obtained.      He denies a personal or family history of anesthesia complications or bleeding disorders.     Patient Active Problem List   Diagnosis    Moderate persistent asthma    Erectile dysfuntion    GERD (gastroesophageal reflux disease)    LVH (left ventricular hypertrophy)    Hypertension, benign essential, goal below 140/90    24 hour contact handout given    Degenerative joint disease of knee, left    Hypokalemia    Advanced directives, counseling/discussion    Controlled type 2 diabetes mellitus, without long-term current use of insulin (H)    Hyperlipidemia LDL goal <100    Morbid obesity (H)    Nocturia    Kidney stone        Past Medical History:   Diagnosis Date    Allergic urticaria 01/15/2008    Hypertension goal BP (blood pressure) < 130/80 04/21/2011    Obesity, unspecified 01/15/2008    Uncomplicated asthma     Uncontrolled type 2 diabetes mellitus without complication, without long-term current use of insulin 01/07/2017        Past Surgical History:   Procedure Laterality Date    COLONOSCOPY  5/26/2011    Procedure:COMBINED  COLONOSCOPY, REMOVE TUMOR/POLYP/LESION BY SNARE; Surgeon:BRIANNA TAPIA; Location:[facility]    EXTRACORPOREAL SHOCK WAVE LITHOTRIPSY, CYSTOSCOPY, INSERT STENT URETER(S), COMBINED Right 12/16/2021    Procedure: LITHOTRIPSY,RIGHT  EXTRACORPOREAL SHOCK WAVE (ESWL), WITH  CYSTOSCOPY AND URETERAL STENT INSERTION;  Surgeon: Jatinder Rolle MD;  Location: [facility]       Social History     Tobacco Use    Smoking status: Never    Smokeless tobacco: Never   Substance Use Topics    Alcohol use: Yes     Comment: maybe 1-2 beers max once a week (after bowling)       Family History   Problem Relation Age of Onset    Eye Surgery Mother         cataracts    Cancer Mother     Obesity Father     Diabetes Father     Diabetes Brother     Obesity Brother     Eye Surgery Brother         cataracts    Diabetes Brother     Obesity Brother     Eye Surgery Brother         cataracts    Diabetes Brother     Obesity Brother     Glaucoma No family hx of     Macular Degeneration No family hx of        Prior to Admission medications    Medication Sig Start Date End Date Taking? Authorizing Provider   metoprolol succinate ER (TOPROL XL) 50 MG 24 hr tablet TAKE 1 TABLET (50 MG) BY MOUTH DAILY FOR BLOOD PRESSURE. 5/20/22  Yes Paty Badillo MD   tiZANidine (ZANAFLEX) 4 MG tablet Take 1 tablet (4 mg) by mouth 3 times daily as needed for muscle spasms 8/6/21  Yes Paty Badillo MD   albuterol (PROAIR HFA/PROVENTIL HFA/VENTOLIN HFA) 108 (90 Base) MCG/ACT inhaler INHALE 2 PUFFS INTO THE LUNGS EVERY 6 HOURS AS NEEDED FOR SHORTNESS OF BREATH/DYSPNEA OR WHEEZING 2/10/24   Paty Badillo MD   amLODIPine (NORVASC) 10 MG tablet Take 1 tablet (10 mg) by mouth daily For blood pressure. 2/10/24   Paty Badillo MD   amLODIPine (NORVASC) 10 MG tablet TAKE 1 TABLET BY MOUTH EVERY DAY 2/8/24   Paty Badillo MD   Ascorbic Acid (VITAMIN C CR PO) Take 1,000 mg by mouth daily.    Reported, Patient   aspirin 81 MG chewable tablet Take 81 mg by  mouth daily.    Reported, Patient   blood glucose (NO BRAND SPECIFIED) test strip Use to test blood sugar 1-3 times daily or as directed. 5/28/21   Paty Badillo MD   blood glucose monitoring (NO BRAND SPECIFIED) meter device kit Use to test blood sugar 1-3 times daily or as directed. 5/28/21   Paty Badillo MD   cetirizine (ZYRTEC) 10 MG tablet Take 10 mg by mouth daily.    Reported, Patient   Cholecalciferol (VITAMIN D) 1000 UNIT capsule Take 1 capsule by mouth daily     Reported, Patient   ciclopirox (PENLAC) 8 % external solution Apply topically daily Apply to affected nails daily.  Remove the residual build up weekly with nail polish remover or an [place] board. 3/29/24   Oscar Rapp DPM   empagliflozin (JARDIANCE) 10 MG TABS tablet Take 1 tablet (10 mg) by mouth every morning 3/22/24   Paty Badillo MD   fluticasone-salmeterol (ADVAIR) 250-50 MCG/ACT inhaler Inhale 1 puff into the lungs 2 times daily INHALE 1 PUFF INTO THE LUNGS EVERY 12 HOURS 2/10/24   Paty Badillo MD   KLOR-CON 20 MEQ CR tablet TAKE 1 TABLET BY MOUTH DAILY 11/24/21   Paty Badillo MD   losartan (COZAAR) 100 MG tablet Take 1 tablet (100 mg) by mouth daily For blood pressure. 2/10/24   Paty Badillo MD   losartan (COZAAR) 100 MG tablet TAKE 1 TABLET BY MOUTH EVERY DAY 2/8/24   Paty Badillo MD   Melatonin 10 MG TABS tablet Take 20 mg by mouth nightly as needed for sleep    Reported, Patient   meloxicam (MOBIC) 15 MG tablet Take 1 tablet (15 mg) by mouth daily 5/9/22   Paty Badillo MD   metFORMIN (GLUCOPHAGE XR) 500 MG 24 hr tablet Take 2 tablets (1,000 mg) by mouth 2 times daily (with meals) 2/10/24   Paty Badillo MD   metFORMIN (GLUCOPHAGE XR) 500 MG 24 hr tablet TAKE 2 TABLETS BY MOUTH 2 TIMES DAILY WITH MEALS 2/8/24   Paty Badillo MD   MULTI-VITAMIN OR Once daily    Reported, Patient   Omega-3 Fatty Acids (FISH OIL PO) Take  by mouth 2 times daily.    Reported, Patient  "  omeprazole (PRILOSEC) 20 MG capsule Take 20 mg by mouth daily.    Reported, Patient   simvastatin (ZOCOR) 20 MG tablet TAKE 1 TABLET BY MOUTH EVERYDAY AT BEDTIME. For cholesterol. 2/10/24   Paty Badillo MD   simvastatin (ZOCOR) 20 MG tablet TAKE 1 TABLET BY MOUTH EVERYDAY AT BEDTIME 2/8/24   Paty Badillo MD   tamsulosin (FLOMAX) 0.4 MG capsule Take 1 capsule (0.4 mg) by mouth At Bedtime 11/22/21   Jatinder Rolle MD       Allergies   Allergen Reactions    Cats     Grass     Hazelnut (Filbert)         REVIEW OF SYSTEMS:   5 point ROS negative except as noted above in HPI, including Gen., Resp., CV, GI &  system review.    PHYSICAL EXAM:   There were no vitals taken for this visit. Estimated body mass index is 40.25 kg/m  as calculated from the following:    Height as of 3/29/24: 1.905 m (6' 3\").    Weight as of 3/29/24: 146.1 kg (322 lb).   Constitutional: Awake, alert, no acute distress.  Eyes: No scleral icterus.  Conjunctiva are without injection.  ENMT: Mucous membranes moist, dentition and gums are intact.   Neck: Soft, supple, trachea midline.    Endocrine: n/a   Lymphatic: There is no cervical, submandibularadenopathy.  Respiratory: normal effortgs   Cardiovascular: S1, S2  Abdomen: Non-distended, non-tender,  No masses,  Musculoskeletal: No spinal or CVA tenderness. Full range of motion in the upper and lower extremities.    Skin: No skin rashes or lesions to inspection.  No petechia.    Neurologic: alerted and oriented 3x  Psychiatric: The patient's affect is not blunted and mood is appropriate.  DIAGNOSTICS:    Not indicated    IMPRESSION   ASA Class 2 - Mild systemic disease    PLAN:   Plan for Colonoscopy with possible biopsy, possible polypectomy. We discussed the risks, benefits and alternatives and the patient wished to proceed.  Patient is cleared for the above procedure.    The above has been forwarded to the consulting provider.    Kristine Power DO, PGY - 2   [facility] " Surgery

## 2024-04-23 LAB
PATH REPORT.COMMENTS IMP SPEC: NORMAL
PATH REPORT.COMMENTS IMP SPEC: NORMAL
PATH REPORT.FINAL DX SPEC: NORMAL
PATH REPORT.GROSS SPEC: NORMAL
PATH REPORT.MICROSCOPIC SPEC OTHER STN: NORMAL
PATH REPORT.RELEVANT HX SPEC: NORMAL
PHOTO IMAGE: NORMAL

## 2024-05-02 ENCOUNTER — PATIENT OUTREACH (OUTPATIENT)
Dept: GASTROENTEROLOGY | Facility: CLINIC | Age: 64
End: 2024-05-02
Payer: COMMERCIAL

## 2024-06-17 PROBLEM — Z71.89 ADVANCED DIRECTIVES, COUNSELING/DISCUSSION: Status: RESOLVED | Noted: 2017-01-04 | Resolved: 2024-06-17

## 2024-10-08 DIAGNOSIS — N18.30 CONTROLLED TYPE 2 DIABETES MELLITUS WITH STAGE 3 CHRONIC KIDNEY DISEASE, WITHOUT LONG-TERM CURRENT USE OF INSULIN (H): ICD-10-CM

## 2024-10-08 DIAGNOSIS — E11.22 CONTROLLED TYPE 2 DIABETES MELLITUS WITH STAGE 3 CHRONIC KIDNEY DISEASE, WITHOUT LONG-TERM CURRENT USE OF INSULIN (H): ICD-10-CM

## 2024-11-06 ENCOUNTER — TELEPHONE (OUTPATIENT)
Dept: FAMILY MEDICINE | Facility: CLINIC | Age: 64
End: 2024-11-06
Payer: COMMERCIAL

## 2024-11-06 DIAGNOSIS — I10 HYPERTENSION, BENIGN ESSENTIAL, GOAL BELOW 140/90: ICD-10-CM

## 2024-11-06 DIAGNOSIS — E78.5 HYPERLIPIDEMIA LDL GOAL <100: ICD-10-CM

## 2024-11-06 DIAGNOSIS — E11.9 CONTROLLED TYPE 2 DIABETES MELLITUS WITHOUT COMPLICATION, WITHOUT LONG-TERM CURRENT USE OF INSULIN (H): ICD-10-CM

## 2024-11-06 RX ORDER — METFORMIN HYDROCHLORIDE 500 MG/1
1000 TABLET, EXTENDED RELEASE ORAL 2 TIMES DAILY WITH MEALS
Qty: 360 TABLET | Refills: 1 | Status: SHIPPED | OUTPATIENT
Start: 2024-11-06

## 2024-11-06 RX ORDER — LOSARTAN POTASSIUM 100 MG/1
100 TABLET ORAL DAILY
Qty: 90 TABLET | Refills: 1 | Status: SHIPPED | OUTPATIENT
Start: 2024-11-06

## 2024-11-06 RX ORDER — AMLODIPINE BESYLATE 10 MG/1
10 TABLET ORAL DAILY
Qty: 90 TABLET | Refills: 1 | Status: SHIPPED | OUTPATIENT
Start: 2024-11-06

## 2024-11-06 RX ORDER — SIMVASTATIN 20 MG
TABLET ORAL
Qty: 90 TABLET | Refills: 1 | Status: SHIPPED | OUTPATIENT
Start: 2024-11-06

## 2024-11-06 NOTE — TELEPHONE ENCOUNTER
Message from patient:       Hi Doc, hope all is well with you (and because hunting starts this weekend, I know you are well!)     I would like to get either a pain killer or a muscle relaxer prescribed to help with my shoulder pain and sore back muscles.  Seems that I aggravated both with cutting trees down and  straight days of golf.  I was going to try and schedule a shoulder replacement surgery, but with my youngest grandson having brain surgery next month, I can wait.  I'm a little leery of taking as much Ibruprofin as I do.     Your thoughts?  And good luck this weekend.          Светлана Guadarrama   Lead    [facility]

## 2024-11-08 ENCOUNTER — MYC MEDICAL ADVICE (OUTPATIENT)
Dept: FAMILY MEDICINE | Facility: CLINIC | Age: 64
End: 2024-11-08
Payer: COMMERCIAL

## 2024-11-08 DIAGNOSIS — M54.50 LUMBAR PAIN: Primary | ICD-10-CM

## 2024-11-16 ENCOUNTER — HEALTH MAINTENANCE LETTER (OUTPATIENT)
Age: 64
End: 2024-11-16

## 2025-02-02 DIAGNOSIS — J45.40 MODERATE PERSISTENT ASTHMA WITHOUT COMPLICATION: ICD-10-CM

## 2025-02-02 RX ORDER — FLUTICASONE PROPIONATE AND SALMETEROL 250; 50 UG/1; UG/1
POWDER RESPIRATORY (INHALATION)
Qty: 180 EACH | Refills: 3 | Status: SHIPPED | OUTPATIENT
Start: 2025-02-02

## 2025-02-11 ENCOUNTER — MYC MEDICAL ADVICE (OUTPATIENT)
Dept: FAMILY MEDICINE | Facility: CLINIC | Age: 65
End: 2025-02-11
Payer: COMMERCIAL

## 2025-02-11 DIAGNOSIS — J20.9 ACUTE BRONCHITIS, UNSPECIFIED ORGANISM: Primary | ICD-10-CM

## 2025-02-11 DIAGNOSIS — J45.40 MODERATE PERSISTENT ASTHMA WITHOUT COMPLICATION: ICD-10-CM

## 2025-02-11 RX ORDER — ALBUTEROL SULFATE 90 UG/1
INHALANT RESPIRATORY (INHALATION)
Qty: 13.4 G | Refills: 1 | Status: SHIPPED | OUTPATIENT
Start: 2025-02-11

## 2025-02-12 RX ORDER — DOXYCYCLINE 100 MG/1
100 CAPSULE ORAL 2 TIMES DAILY
Qty: 14 CAPSULE | Refills: 0 | Status: SHIPPED | OUTPATIENT
Start: 2025-02-12 | End: 2025-02-19

## 2025-03-25 ENCOUNTER — OFFICE VISIT (OUTPATIENT)
Dept: FAMILY MEDICINE | Facility: CLINIC | Age: 65
End: 2025-03-25
Payer: COMMERCIAL

## 2025-03-25 VITALS
SYSTOLIC BLOOD PRESSURE: 154 MMHG | HEIGHT: 75 IN | BODY MASS INDEX: 39.17 KG/M2 | RESPIRATION RATE: 20 BRPM | TEMPERATURE: 97.5 F | WEIGHT: 315 LBS | HEART RATE: 92 BPM | OXYGEN SATURATION: 95 % | DIASTOLIC BLOOD PRESSURE: 94 MMHG

## 2025-03-25 DIAGNOSIS — R40.0 DAYTIME SLEEPINESS: ICD-10-CM

## 2025-03-25 DIAGNOSIS — Z29.11 NEED FOR VACCINATION AGAINST RESPIRATORY SYNCYTIAL VIRUS: ICD-10-CM

## 2025-03-25 DIAGNOSIS — Z23 NEED FOR TDAP VACCINATION: ICD-10-CM

## 2025-03-25 DIAGNOSIS — E66.01 MORBID OBESITY (H): ICD-10-CM

## 2025-03-25 DIAGNOSIS — Z00.00 MEDICARE ANNUAL WELLNESS VISIT, SUBSEQUENT: Primary | ICD-10-CM

## 2025-03-25 DIAGNOSIS — R06.83 SNORING: ICD-10-CM

## 2025-03-25 DIAGNOSIS — I10 HYPERTENSION, BENIGN ESSENTIAL, GOAL BELOW 140/90: ICD-10-CM

## 2025-03-25 DIAGNOSIS — E11.22 CONTROLLED TYPE 2 DIABETES MELLITUS WITH STAGE 3 CHRONIC KIDNEY DISEASE, WITHOUT LONG-TERM CURRENT USE OF INSULIN (H): ICD-10-CM

## 2025-03-25 DIAGNOSIS — R35.1 NOCTURIA: ICD-10-CM

## 2025-03-25 DIAGNOSIS — Z23 NEED FOR SHINGLES VACCINE: ICD-10-CM

## 2025-03-25 DIAGNOSIS — J45.40 MODERATE PERSISTENT ASTHMA WITHOUT COMPLICATION: ICD-10-CM

## 2025-03-25 DIAGNOSIS — N18.30 CONTROLLED TYPE 2 DIABETES MELLITUS WITH STAGE 3 CHRONIC KIDNEY DISEASE, WITHOUT LONG-TERM CURRENT USE OF INSULIN (H): ICD-10-CM

## 2025-03-25 DIAGNOSIS — E78.5 HYPERLIPIDEMIA LDL GOAL <100: ICD-10-CM

## 2025-03-25 DIAGNOSIS — Z12.5 SCREENING FOR PROSTATE CANCER: ICD-10-CM

## 2025-03-25 PROBLEM — E11.43 TYPE II DIABETES MELLITUS WITH PERIPHERAL AUTONOMIC NEUROPATHY (H): Status: ACTIVE | Noted: 2025-03-25

## 2025-03-25 LAB
ALBUMIN SERPL BCG-MCNC: 4.1 G/DL (ref 3.5–5.2)
ALP SERPL-CCNC: 89 U/L (ref 40–150)
ALT SERPL W P-5'-P-CCNC: 24 U/L (ref 0–70)
ANION GAP SERPL CALCULATED.3IONS-SCNC: 15 MMOL/L (ref 7–15)
AST SERPL W P-5'-P-CCNC: 26 U/L (ref 0–45)
BILIRUB SERPL-MCNC: 0.6 MG/DL
BUN SERPL-MCNC: 26.3 MG/DL (ref 8–23)
CALCIUM SERPL-MCNC: 8.7 MG/DL (ref 8.8–10.4)
CHLORIDE SERPL-SCNC: 96 MMOL/L (ref 98–107)
CHOLEST SERPL-MCNC: 118 MG/DL
CREAT SERPL-MCNC: 1.39 MG/DL (ref 0.67–1.17)
EGFRCR SERPLBLD CKD-EPI 2021: 56 ML/MIN/1.73M2
EST. AVERAGE GLUCOSE BLD GHB EST-MCNC: 151 MG/DL
FASTING STATUS PATIENT QL REPORTED: YES
FASTING STATUS PATIENT QL REPORTED: YES
GLUCOSE SERPL-MCNC: 164 MG/DL (ref 70–99)
HBA1C MFR BLD: 6.9 % (ref 0–5.6)
HCO3 SERPL-SCNC: 28 MMOL/L (ref 22–29)
HDLC SERPL-MCNC: 28 MG/DL
LDLC SERPL CALC-MCNC: 37 MG/DL
NONHDLC SERPL-MCNC: 90 MG/DL
POTASSIUM SERPL-SCNC: 3 MMOL/L (ref 3.4–5.3)
PROT SERPL-MCNC: 6.9 G/DL (ref 6.4–8.3)
PSA SERPL DL<=0.01 NG/ML-MCNC: 2.06 NG/ML (ref 0–4.5)
SODIUM SERPL-SCNC: 139 MMOL/L (ref 135–145)
TRIGL SERPL-MCNC: 266 MG/DL

## 2025-03-25 PROCEDURE — 80053 COMPREHEN METABOLIC PANEL: CPT | Performed by: FAMILY MEDICINE

## 2025-03-25 PROCEDURE — G2211 COMPLEX E/M VISIT ADD ON: HCPCS | Performed by: FAMILY MEDICINE

## 2025-03-25 PROCEDURE — G0438 PPPS, INITIAL VISIT: HCPCS | Performed by: FAMILY MEDICINE

## 2025-03-25 PROCEDURE — 83036 HEMOGLOBIN GLYCOSYLATED A1C: CPT | Mod: GZ | Performed by: FAMILY MEDICINE

## 2025-03-25 PROCEDURE — 1126F AMNT PAIN NOTED NONE PRSNT: CPT | Performed by: FAMILY MEDICINE

## 2025-03-25 PROCEDURE — 99214 OFFICE O/P EST MOD 30 MIN: CPT | Mod: 25 | Performed by: FAMILY MEDICINE

## 2025-03-25 PROCEDURE — 80061 LIPID PANEL: CPT | Performed by: FAMILY MEDICINE

## 2025-03-25 PROCEDURE — G0103 PSA SCREENING: HCPCS | Performed by: FAMILY MEDICINE

## 2025-03-25 PROCEDURE — 36415 COLL VENOUS BLD VENIPUNCTURE: CPT | Performed by: FAMILY MEDICINE

## 2025-03-25 PROCEDURE — 3077F SYST BP >= 140 MM HG: CPT | Performed by: FAMILY MEDICINE

## 2025-03-25 PROCEDURE — 3080F DIAST BP >= 90 MM HG: CPT | Performed by: FAMILY MEDICINE

## 2025-03-25 RX ORDER — TIRZEPATIDE 2.5 MG/.5ML
2.5 INJECTION, SOLUTION SUBCUTANEOUS WEEKLY
Qty: 7 ML | Refills: 0 | Status: SHIPPED | OUTPATIENT
Start: 2025-03-25

## 2025-03-25 SDOH — HEALTH STABILITY: PHYSICAL HEALTH: ON AVERAGE, HOW MANY DAYS PER WEEK DO YOU ENGAGE IN MODERATE TO STRENUOUS EXERCISE (LIKE A BRISK WALK)?: 0 DAYS

## 2025-03-25 ASSESSMENT — PAIN SCALES - GENERAL: PAINLEVEL_OUTOF10: NO PAIN (0)

## 2025-03-25 ASSESSMENT — ASTHMA QUESTIONNAIRES
ACT_TOTALSCORE: 20
QUESTION_3 LAST FOUR WEEKS HOW OFTEN DID YOUR ASTHMA SYMPTOMS (WHEEZING, COUGHING, SHORTNESS OF BREATH, CHEST TIGHTNESS OR PAIN) WAKE YOU UP AT NIGHT OR EARLIER THAN USUAL IN THE MORNING: NOT AT ALL
QUESTION_1 LAST FOUR WEEKS HOW MUCH OF THE TIME DID YOUR ASTHMA KEEP YOU FROM GETTING AS MUCH DONE AT WORK, SCHOOL OR AT HOME: NONE OF THE TIME
QUESTION_4 LAST FOUR WEEKS HOW OFTEN HAVE YOU USED YOUR RESCUE INHALER OR NEBULIZER MEDICATION (SUCH AS ALBUTEROL): ONE OR TWO TIMES PER DAY
QUESTION_2 LAST FOUR WEEKS HOW OFTEN HAVE YOU HAD SHORTNESS OF BREATH: ONCE OR TWICE A WEEK
QUESTION_5 LAST FOUR WEEKS HOW WOULD YOU RATE YOUR ASTHMA CONTROL: WELL CONTROLLED

## 2025-03-25 ASSESSMENT — SOCIAL DETERMINANTS OF HEALTH (SDOH): HOW OFTEN DO YOU GET TOGETHER WITH FRIENDS OR RELATIVES?: TWICE A WEEK

## 2025-03-25 NOTE — PATIENT INSTRUCTIONS
I would recommend seeing our sleep center:  [facility] will call you to coordinate your care as prescribed by your provider. If you don't hear from a representative within 2 business days, please call [phone].     For the night time urination, sounds like an enlarged prostate. See urology. [facility] will call you to coordinate care as prescribed your provider. If you don t hear from a representative within 2 business days, please call [phone].     Blood tests today. We have been checking your PSA, it's been normal.     I don't think checking a testosterone is needed at this time.     I would recommend getting the following shots: tetanus, RSV, shingles. This prescription was sent to the [facility].     For weight loss and diabetes, try the once per week shot called Mounjaro. Insurance can be an issue.     Follow up appointment in 3 months.     Colonoscopy in 2027.

## 2025-03-25 NOTE — PROGRESS NOTES
Preventive Care Visit  [facility] MARÍA Badillo MD, Family Medicine  Mar 25, 2025      Assessment & Plan     (Z00.00) Medicare annual wellness visit, subsequent  (primary encounter diagnosis)  Comment:  Reviewed the need for periodic healthcare exams and screenings.   Plan: REVIEW OF HEALTH MAINTENANCE PROTOCOL ORDERS        Advise yearly physicals.    (E11.22,  N18.30) Controlled type 2 diabetes mellitus with stage 3 chronic kidney disease, without long-term current use of insulin (H)  Comment: Currently on single drug regimen, metformin.  A1c slightly elevated but acceptable.  He would benefit from the addition of a GLP-1 agonist for both glycemic control and weight loss.  Lab Results   Component Value Date    A1C 6.9 03/25/2025    A1C 7.1 03/15/2024    A1C 6.0 12/01/2021    A1C 6.2 08/19/2020    A1C 6.6 11/13/2019    A1C 6.2 08/06/2019    A1C 5.6 05/24/2018    A1C 5.9 03/10/2017      Plan: Adult Eye  Referral, tirzepatide         (MOUNJARO) 2.5 MG/0.5ML SOAJ auto-injector pen        Open 3 months.    (E66.01) Morbid obesity (H)  Comment: With associated comorbidities of diabetes, hypertension hypercholesterolemia, asthma.  He would benefit from weight reduction.  Will send in for GLP-1 agonist.  Plan: tirzepatide (MOUNJARO) 2.5 MG/0.5ML SOAJ         auto-injector pen        Reviewed side effects and difficulty with insurance coverage.    (Z68.41) Body mass index (BMI) 40.0-44.9, adult (H)  Comment: Reviewed lifestyle.  Will also start GLP-1 agonist.  Plan: Monitor.    (R35.1) Nocturia  Comment: Patient notes weak stream, nocturia times 4 at night disruptive to regular sleep patterns.  Currently on tamsulosin.  Patient has discontinued thiazide diuretic and SGLT2 inhibitor secondary to increased urination.  Plan: Adult Urology  Referral        Will refer to urology for consultation.    (I10) Hypertension, benign essential, goal below 140/90  Comment: elevated. Currently  on 2 drug regiment: ARB and amlodipine.   Plan: reviewed lifestyle.  No change in medications at this time, recheck in 3 months.  If still elevated, will consider additional medications.    (J45.40) Moderate persistent asthma without complication  Comment: Doing well with consistent use of his controller inhaler.  Plan: Continue.    (R40.0) Daytime sleepiness  Comment: See below.  Plan: Adult Sleep Eval & Management          Referral            (R06.83) Snoring  Comment: Spouse reports loud snoring, occasional gasping at night.  Associated daytime tiredness.  Plan: Adult Sleep Eval & Management          Referral        Refer for sleep consultation.  High probability of obstructive sleep apnea.    (E78.5) Hyperlipidemia LDL goal <100  Comment: Tolerating moderate intensity statin therapy well.  Plan: Lipid panel reflex to direct LDL Fasting        Await test results     (Z23) Need for shingles vaccine  Plan: zoster vaccine recombinant adjuvanted         (SHINGRIX) injection            (Z23) Need for Tdap vaccination  Plan: Tdap, tetanus-diptheria-acell pertussis,         (BOOSTRIX) 5-2.5-18.5 LF-MCG/0.5 SILVIANO injection            (Z29.11) Need for vaccination against respiratory syncytial virus  Plan: RSV vaccine, bivalent, ABRYSVO, injection            (Z12.5) Screening for prostate cancer  Plan: PSA, screen        Await test results     Results for orders placed or performed in visit on 03/25/25   Hemoglobin A1c     Status: Abnormal   Result Value Ref Range    Estimated Average Glucose 151 (H) <117 mg/dL    Hemoglobin A1C 6.9 (H) 0.0 - 5.6 %   PSA, screen     Status: Normal   Result Value Ref Range    Prostate Specific Antigen Screen 2.06 0.00 - 4.50 ng/mL    Narrative    This result is obtained using the Roche Elecsys total PSA method on the ortega e801 immunoassay analyzer, which is an ultrasensitive method. Results obtained with different assay methods or kits cannot be used  interchangeably.  This test is intended for initial prostate cancer screening. PSA values exceeding the age-specific limits are suspicious for prostate disease, but additional testing, such as prostate biopsy, is needed to diagnose prostate pathology. The American Cancer Society recommends annual examination with digital rectal examination and serum PSA beginning at age 50 and for men with a life expectancy of at least 10 years after detection of prostate cancer. For men in high-risk groups, such as  Americans or men with a first-degree relative diagnosed at a younger age, testing should begin at a younger age. It is generally recommended that information be provided to patients about the benefits and limitations of testing and treatment so they can make informed decisions.   Lipid panel reflex to direct LDL Fasting     Status: Abnormal   Result Value Ref Range    Cholesterol 118 <200 mg/dL    Triglycerides 266 (H) <150 mg/dL    Direct Measure HDL 28 (L) >=40 mg/dL    LDL Cholesterol Calculated 37 <100 mg/dL    Non HDL Cholesterol 90 <130 mg/dL    Patient Fasting > 8hrs? Yes     Narrative    Cholesterol  Desirable: < 200 mg/dL  Borderline High: 200 - 239 mg/dL  High: >= 240 mg/dL    Triglycerides  Normal: < 150 mg/dL  Borderline High: 150 - 199 mg/dL  High: 200-499 mg/dL  Very High: >= 500 mg/dL    Direct Measure HDL  Female: >= 50 mg/dL   Male: >= 40 mg/dL    LDL Cholesterol  Desirable: < 100 mg/dL  Above Desirable: 100 - 129 mg/dL   Borderline High: 130 - 159 mg/dL   High:  160 - 189 mg/dL   Very High: >= 190 mg/dL    Non HDL Cholesterol  Desirable: < 130 mg/dL  Above Desirable: 130 - 159 mg/dL  Borderline High: 160 - 189 mg/dL  High: 190 - 219 mg/dL  Very High: >= 220 mg/dL   Comprehensive metabolic panel (BMP + Alb, Alk Phos, ALT, AST, Total. Bili, TP)     Status: Abnormal   Result Value Ref Range    Sodium 139 135 - 145 mmol/L    Potassium 3.0 (L) 3.4 - 5.3 mmol/L    Carbon Dioxide (CO2) 28 22 - 29  "mmol/L    Anion Gap 15 7 - 15 mmol/L    Urea Nitrogen 26.3 (H) 8.0 - 23.0 mg/dL    Creatinine 1.39 (H) 0.67 - 1.17 mg/dL    GFR Estimate 56 (L) >60 mL/min/1.73m2    Calcium 8.7 (L) 8.8 - 10.4 mg/dL    Chloride 96 (L) 98 - 107 mmol/L    Glucose 164 (H) 70 - 99 mg/dL    Alkaline Phosphatase 89 40 - 150 U/L    AST 26 0 - 45 U/L    ALT 24 0 - 70 U/L    Protein Total 6.9 6.4 - 8.3 g/dL    Albumin 4.1 3.5 - 5.2 g/dL    Bilirubin Total 0.6 <=1.2 mg/dL    Patient Fasting > 8hrs? Yes         Patient Instructions   I would recommend seeing our sleep center:  [facility] will call you to coordinate your care as prescribed by your provider. If you don't hear from a representative within 2 business days, please call [phone].     For the night time urination, sounds like an enlarged prostate. See urology. [facility] will call you to coordinate care as prescribed your provider. If you don t hear from a representative within 2 business days, please call [phone].     Blood tests today. We have been checking your PSA, it's been normal.     I don't think checking a testosterone is needed at this time.     I would recommend getting the following shots: tetanus, RSV, shingles. This prescription was sent to the [facility].     For weight loss and diabetes, try the once per week shot called Mounjaro. Insurance can be an issue.     Follow up appointment in 3 months.     Colonoscopy in 2027.      Patient has been advised of split billing requirements and indicates understanding: Yes        BMI  Estimated body mass index is 40.62 kg/m  as calculated from the following:    Height as of this encounter: 1.905 m (6' 3\").    Weight as of this encounter: 147.4 kg (325 lb).   Weight management plan: Discussed healthy diet and exercise guidelines will let GLP-1 agonist.    Counseling  Appropriate preventive services were addressed with this patient via screening, questionnaire, or discussion as appropriate for fall " prevention, nutrition, physical activity, Tobacco-use cessation, social engagement, weight loss and cognition.  Checklist reviewing preventive services available has been given to the patient.  Reviewed patient's diet, addressing concerns and/or questions.   The patient was instructed to see the dentist every 6 months.   He is at risk for psychosocial distress and has been provided with information to reduce risk.       Danya Bhat is a 64 year old, presenting for the following:  Physical        3/25/2025     8:09 AM   Additional Questions   Roomed by Katina Prakash CMA   Accompanied by None         3/25/2025     8:09 AM   Patient Reported Additional Medications   Patient reports taking the following new medications None           HPI    Advance Care Planning  Patient does not have a Health Care Directive: Discussed advance care planning with patient; however, patient declined at this time.      3/25/2025   General Health   How would you rate your overall physical health? Good   Feel stress (tense, anxious, or unable to sleep) To some extent   (!) STRESS CONCERN      3/25/2025   Nutrition   Diet: Regular (no restrictions)         3/25/2025   Exercise   Days per week of moderate/strenous exercise 0 days   (!) EXERCISE CONCERN      3/25/2025   Social Factors   Frequency of gathering with friends or relatives Twice a week   Worry food won't last until get money to buy more No   Food not last or not have enough money for food? No   Do you have housing? (Housing is defined as stable permanent housing and does not include staying ouside in a car, in a tent, in an abandoned building, in an overnight shelter, or couch-surfing.) No   Are you worried about losing your housing? No   Lack of transportation? No   Unable to get utilities (heat,electricity)? No   Want help with housing or utility concern? No   (!) HOUSING CONCERN PRESENT      3/25/2025   Fall Risk   Fallen 2 or more times in the past year? No   Trouble with  walking or balance? No          3/25/2025   Activities of Daily Living- Home Safety   Needs help with the following daily activites None of the above   Safety concerns in the home None of the above         3/25/2025   Dental   Dentist two times every year? (!) NO         3/25/2025   Hearing Screening   Hearing concerns? None of the above         3/25/2025   Driving Risk Screening   Patient/family members have concerns about driving No         3/25/2025   General Alertness/Fatigue Screening   Have you been more tired than usual lately? No         3/25/2025   Urinary Incontinence Screening   Bothered by leaking urine in past 6 months No           3/22/2024   TB Screening   Were you born outside of the [place]? No           Today's PHQ-2 Score:       3/25/2025     8:07 AM   PHQ-2 ( 1999 Pf[employer])   Q1: Little interest or pleasure in doing things 0   Q2: Feeling down, depressed or hopeless 0   PHQ-2 Score 0    Q1: Little interest or pleasure in doing things Not at all   Q2: Feeling down, depressed or hopeless Not at all   PHQ-2 Score 0       Patient-reported           3/25/2025   Substance Use   Alcohol more than 3/day or more than 7/wk No   Do you have a current opioid prescription? No   How severe/bad is pain from 1 to 10? 0/10 (No Pain)   Do you use any other substances recreationally? No     Social History     Tobacco Use    Smoking status: Never     Passive exposure: Never    Smokeless tobacco: Never   Vaping Use    Vaping status: Never Used   Substance Use Topics    Alcohol use: Yes     Comment: maybe 1-2 beers max once a week (after bowling)    Drug use: No           3/25/2025   AAA Screening   Family history of Abdominal Aortic Aneurysm (AAA)? No   Last PSA:   PSA   Date Value Ref Range Status   12/21/2009 0.66 0 - 4 ug/L Final     Prostate Specific Antigen Screen   Date Value Ref Range Status   03/15/2024 1.57 0.00 - 4.50 ng/mL Final     PSA Tumor Marker   Date Value Ref Range Status   10/26/2021 1.13 0.00 - 4.00 ug/L  Final     ASCVD Risk   The ASCVD Risk score (Vern REGALADO, et al., 2019) failed to calculate for the following reasons:    The valid total cholesterol range is 130 to 320 mg/dL            Reviewed and updated as needed this visit by Provider                    Lab work is in process  Labs reviewed in EPIC  Current providers sharing in care for this patient include:  Patient Care Team:  Paty Badillo MD as PCP - General (Family Practice)  Jatinder Elizondo MD as MD (Dermatology)  Paty Badillo MD as Assigned PCP  Madisyn Monsalve OD as MD (Optometry)  Madisyn Monsalve OD as Assigned Surgical Provider    The following health maintenance items are reviewed in Epic and correct as of today:  Health Maintenance   Topic Date Due    URINE DRUG SCREEN  Never done    HEPATITIS C SCREENING  Never done    Pneumococcal Vaccine: 50+ Years (1 of 2 - PCV) Never done    DTAP/TDAP/TD IMMUNIZATION (1 - Tdap) Never done    ZOSTER IMMUNIZATION (1 of 2) Never done    RSV VACCINE (1 - Risk 60-74 years 1-dose series) Never done    DIABETIC FOOT EXAM  12/01/2022    INFLUENZA VACCINE (1) Never done    COVID-19 Vaccine (6 - 2024-25 season) 09/01/2024    Medicare Annual MTM Pharmacist Visit (once per calendar year)  Never done    BMP  03/15/2025    LIPID  03/15/2025    MICROALBUMIN  03/22/2025    HEMOGLOBIN  03/15/2025    MEDICARE ANNUAL WELLNESS VISIT  03/22/2025    EYE EXAM  04/12/2025    A1C  09/25/2025    ASTHMA CONTROL TEST  09/25/2025    ANNUAL REVIEW OF HM ORDERS  03/25/2026    ASTHMA ACTION PLAN  03/25/2026    FALL RISK ASSESSMENT  03/25/2026    COLORECTAL CANCER SCREENING  04/18/2027    ADVANCE CARE PLANNING  03/22/2029    PHQ-2 (once per calendar year)  Completed    URINALYSIS  Completed    HPV IMMUNIZATION  Aged Out    MENINGITIS IMMUNIZATION  Aged Out         Review of Systems  CONSTITUTIONAL: NEGATIVE for fever, chills, change in weight.  Positive for daytime tiredness.  ENT/MOUTH: NEGATIVE  "for ear, mouth and throat problems  RESP: NEGATIVE for significant cough or SOB  CV: NEGATIVE for chest pain, palpitations or peripheral edema  : Notes nocturia x 4, decreased urinary stream.  MUSCULOSKELETAL: Ongoing right knee pain.  PSYCHIATRIC: Notes some anhedonia, fatigue, loss of purpose.  No major depressive symptoms.     Objective    Exam  BP (!) 154/94   Pulse 92   Temp 97.5  F (36.4  C) (Temporal)   Resp 20   Ht 1.905 m (6' 3\")   Wt (!) 147.4 kg (325 lb)   SpO2 95%   BMI 40.62 kg/m     Estimated body mass index is 40.62 kg/m  as calculated from the following:    Height as of this encounter: 1.905 m (6' 3\").    Weight as of this encounter: 147.4 kg (325 lb).    Physical Exam  GENERAL: Healthy, alert and no distress  EYES: Eyes grossly normal to inspection, conjunctivae and sclerae normal  RESP: Lungs clear to auscultation - no rales, rhonchi or wheezes  CV: Regular rate and rhythm, normal S1 S2, no murmur  MS: No gross musculoskeletal defects noted, no edema  NEURO: Normal strength and tone, mentation intact and speech normal  PSYCH: Mentation appears normal, affect normal/bright     The longitudinal plan of care for the diagnosis(es)/condition(s) as documented were addressed during this visit. Due to the added complexity in care, I will continue to support Home in the subsequent management and with ongoing continuity of care.         3/25/2025   Mini Cog   Clock Draw Score 2 Normal   3 Item Recall 3 objects recalled   Mini Cog Total Score 5             3/22/2024   Vision Screen   Vision Screen Results Pass       Signed Electronically by: Paty Badillo MD    "

## 2025-03-25 NOTE — LETTER
My Asthma Action Plan    Name: Bi Bishop   YOB: 1960  Date: 3/25/2025   My doctor: Paty Badillo MD   My clinic: [facility]        My Control Medicine: Fluticasone propionate + salmeterol (Advair Diskus or Wixela Inhub) -  250/50 mcg Inhale 2 puffs into the lungs 2 times daily  My Rescue Medicine: Albuterol (Proair/Ventolin/Proventil HFA) 2-4 puffs EVERY 4 HOURS as needed. Use a spacer if recommended by your provider.   My Asthma Severity:   Moderate Persistent  Know your asthma triggers: Patient is unaware of triggers              GREEN ZONE   Good Control  I feel good  No cough or wheeze  Can work, sleep and play without asthma symptoms       Take your asthma control medicine every day.     If exercise triggers your asthma, take your rescue medication  15 minutes before exercise or sports, and  During exercise if you have asthma symptoms  Spacer to use with inhaler: If you have a spacer, make sure to use it with your inhaler             YELLOW ZONE Getting Worse  I have ANY of these:  I do not feel good  Cough or wheeze  Chest feels tight  Wake up at night   Keep taking your Green Zone medications  Start taking your rescue medicine:  every 20 minutes for up to 1 hour. Then every 4 hours for 24-48 hours.  If you stay in the Yellow Zone for more than 12-24 hours, contact your doctor.  If you do not return to the Green Zone in 12-24 hours or you get worse, start taking your oral steroid medicine if prescribed by your provider.           RED ZONE Medical Alert - Get Help  I have ANY of these:  I feel awful  Medicine is not helping  Breathing getting harder  Trouble walking or talking  Nose opens wide to breathe       Take your rescue medicine NOW  If your provider has prescribed an oral steroid medicine, start taking it NOW  Call your doctor NOW  If you are still in the Red Zone after 20 minutes and you have not reached your doctor:  Take your rescue medicine again  and  Call 911 or go to the emergency room right away    See your regular doctor within 2 weeks of an Emergency Room or Urgent Care visit for follow-up treatment.          Annual Reminders:  Meet with Asthma Educator,  Flu Shot in the Fall, consider Pneumonia Vaccination for patients with asthma (aged 19 and older).    Pharmacy:    [address] [address] [place] [address] [address]  WRITTEN PRESCRIPTION REQUESTED    Electronically signed by Paty Badillo MD   Date: 03/25/25                      Asthma Triggers  How To Control Things That Make Your Asthma Worse    Triggers are things that make your asthma worse.  Look at the list below to help you find your triggers and what you can do about them.  You can help prevent asthma flare-ups by staying away from your triggers.      Trigger                                                          What you can do   Cigarette Smoke  Tobacco smoke can make asthma worse. Do not allow smoking in your home, car or around you.  Be sure no one smokes at a child s day care or school.  If you smoke, ask your health care provider for ways to help you quit.  Ask family members to quit too.  Ask your health care provider for a referral to Quit Plan to help you quit smoking, or call 5-154-090-PLAN.     Colds, Flu, Bronchitis  These are common triggers of asthma. Wash your hands often.  Don t touch your eyes, nose or mouth.  Get a flu shot every year.     Dust Mites  These are tiny bugs that live in cloth or carpet. They are too small to see. Wash sheets and blankets in hot water every week.   Encase pillows and mattress in dust mite proof covers.  Avoid having carpet if you can. If you have carpet, vacuum weekly.   Use a dust mask and HEPA vacuum.   Pollen and Outdoor Mold  Some people are allergic to trees, grass, or weed pollen, or molds. Try to keep your windows closed.  Limit time out doors when pollen count is  high.   Ask you health care provider about taking medicine during allergy season.     Animal Dander  Some people are allergic to skin flakes, urine or saliva from pets with fur or feathers. Keep pets with fur or feathers out of your home.    If you can t keep the pet outdoors, then keep the pet out of your bedroom.  Keep the bedroom door closed.  Keep pets off cloth furniture and away from stuffed toys.     Mice, Rats, and Cockroaches   Some people are allergic to the waste from these pests.   Cover food and garbage.  Clean up spills and food crumbs.  Store grease in the refrigerator.   Keep food out of the bedroom.   Indoor Mold  This can be a trigger if your home has high moisture. Fix leaking faucets, pipes, or other sources of water.   Clean moldy surfaces.  Dehumidify basement if it is damp and smelly.   Smoke, Strong Odors, and Sprays  These can reduce air quality. Stay away from strong odors and sprays, such as perfume, powder, hair spray, paints, smoke incense, paint, cleaning products, candles and new carpet.   Exercise or Sports  Some people with asthma have this trigger. Be active!  Ask your doctor about taking medicine before sports or exercise to prevent symptoms.    Warm up for 5-10 minutes before and after sports or exercise.     Other Triggers of Asthma  Cold air:  Cover your nose and mouth with a scarf.  Sometimes laughing or crying can be a trigger.  Some medicines and food can trigger asthma.

## 2025-04-01 NOTE — CONFIDENTIAL NOTE
Chief Complaint:   LUTS         History of Present Illness:   Bi Bishop is a 64 year old male with a history of T2 DM, kidney stones, HLD, HTN, and LVH who presents for evaluation of LUTS.     The patient reports nocturia x 4, daytime frequency, some urgency, weak stream, and sensation of incomplete bladder emptying. He denies dysuria and gross hematuria.     He tried tamsulosin several years ago and did not find it effective.          Past Medical History:     Past Medical History:   Diagnosis Date    Allergic urticaria 01/15/2008    Hypertension goal BP (blood pressure) < 130/80 04/21/2011    Obesity, unspecified 01/15/2008    Uncomplicated asthma     Uncontrolled type 2 diabetes mellitus without complication, without long-term current use of insulin 01/07/2017            Past Surgical History:     Past Surgical History:   Procedure Laterality Date    COLONOSCOPY  5/26/2011    Procedure:COMBINED COLONOSCOPY, REMOVE TUMOR/POLYP/LESION BY SNARE; Surgeon:BRIANNA TAPIA; Location:[facility]    COLONOSCOPY N/A 4/18/2024    Procedure: COLONOSCOPY, WITH POLYPECTOMY AND BIOPSY;  Surgeon: Velasquez Hess MD;  Location: [facility]    EXTRACORPOREAL SHOCK WAVE LITHOTRIPSY, CYSTOSCOPY, INSERT STENT URETER(S), COMBINED Right 12/16/2021    Procedure: LITHOTRIPSY,RIGHT  EXTRACORPOREAL SHOCK WAVE (ESWL), WITH  CYSTOSCOPY AND URETERAL STENT INSERTION;  Surgeon: Jatinder Rolle MD;  Location: [facility]            Medications     Current Outpatient Medications   Medication Sig Dispense Refill    albuterol (PROAIR HFA/PROVENTIL HFA/VENTOLIN HFA) 108 (90 Base) MCG/ACT inhaler INHALE 2 PUFFS INTO THE LUNGS EVERY 6 HOURS AS NEEDED FOR SHORTNESS OF BREATH/DYSPNEA OR WHEEZING 13.4 g 1    amLODIPine (NORVASC) 10 MG tablet TAKE 1 TABLET BY MOUTH EVERY DAY 90 tablet 1    Ascorbic Acid (VITAMIN C CR PO) Take 1,000 mg by mouth daily.      aspirin 81 MG chewable tablet Take 81 mg by mouth daily.      cetirizine (ZYRTEC) 10 MG tablet  Take 10 mg by mouth daily.      fluticasone-salmeterol (ADVAIR) 250-50 MCG/ACT inhaler INHALE 1 PUFF INTO THE LUNGS 2 TIMES DAILY EVERY 12 HOURS 180 each 3    losartan (COZAAR) 100 MG tablet TAKE 1 TABLET (100 MG) BY MOUTH DAILY FOR BLOOD PRESSURE. 90 tablet 1    Melatonin 10 MG TABS tablet Take 20 mg by mouth nightly as needed for sleep      metFORMIN (GLUCOPHAGE XR) 500 MG 24 hr tablet TAKE 2 TABLETS BY MOUTH 2 TIMES DAILY WITH MEALS 360 tablet 1    metoprolol succinate ER (TOPROL XL) 50 MG 24 hr tablet TAKE 1 TABLET (50 MG) BY MOUTH DAILY FOR BLOOD PRESSURE. 90 tablet 0    Omega-3 Fatty Acids (FISH OIL PO) Take  by mouth 2 times daily.      omeprazole (PRILOSEC) 20 MG capsule Take 20 mg by mouth daily.      simvastatin (ZOCOR) 20 MG tablet TAKE 1 TABLET BY MOUTH EVERYDAY AT BEDTIME. FOR CHOLESTEROL. 90 tablet 1    tirzepatide (MOUNJARO) 2.5 MG/0.5ML SOAJ auto-injector pen Inject 0.5 mLs (2.5 mg) subcutaneously once a week. 7 mL 0    tiZANidine (ZANAFLEX) 4 MG tablet Take 1 tablet (4 mg) by mouth 3 times daily as needed for muscle spasms 30 tablet 1     No current facility-administered medications for this visit.            Allergies:   Cats, Grass, and Hazelnut (filbert)         Review of Systems:  From intake questionnaire   Negative 14 system review except as noted on HPI, nurse's note.         Physical Exam:   Patient is a 64 year old male evaluated via video visit.       Labs and Pathology:    I personally reviewed all applicable laboratory data and went over findings with patient  Significant for:    CBC RESULTS:  Recent Labs   Lab Test 03/15/24  0924 09/10/21  0911 11/13/19  1413   WBC 6.5 6.2 6.4   HGB 14.4 14.6 15.1    249 243        BMP RESULTS:  Recent Labs   Lab Test 03/25/25  0831 03/15/24  0924 12/16/21  1101 12/01/21  1607 09/10/21  0911 09/10/21  0911 08/19/20  1001 11/13/19  1413 08/06/19  0909 06/04/18  0955    142  --  139  --  139 137 138 138 141   POTASSIUM 3.0* 3.8  --  3.4  --   3.3* 3.3* 3.1* 3.2* 3.4   CHLORIDE 96* 100  --  103  --  103 101 102 102 104   CO2 28 30*  --  31  --  33* 32 33* 33* 30   ANIONGAP 15 12  --  5  --  3 4 3 3 7   * 122* 133* 111*   < > 138* 101* 164* 121* 103*   BUN 26.3* 18.5  --  20  --  16 16 17 18 13   CR 1.39* 1.41*  --  1.00  --  1.01 0.97 0.96 0.91 0.81   GFRESTIMATED 56* 55*  --  80  --  79 84 85 >90 >90   GFRESTBLACK  --   --   --   --   --   --  >90 >90 >90 >90   JOEY 8.7* 8.8  --  9.0  --  8.6 8.4* 8.9 8.5 8.3*    < > = values in this interval not displayed.       UA RESULTS:   Recent Labs   Lab Test 09/10/21  0715   SG 1.020   URINEPH 7.5*   NITRITE Negative       PSA RESULTS  PSA   Date Value Ref Range Status   12/21/2009 0.66 0 - 4 ug/L Final     Prostate Specific Antigen Screen   Date Value Ref Range Status   03/25/2025 2.06 0.00 - 4.50 ng/mL Final   03/15/2024 1.57 0.00 - 4.50 ng/mL Final     PSA Tumor Marker   Date Value Ref Range Status   10/26/2021 1.13 0.00 - 4.00 ug/L Final            Assessment and Plan:     Assessment: 64 year old male seen in evaluation for urinary frequency, nocturia, weak stream, and sensation of incomplete bladder emptying. He has tried tamsulosin several years ago.     We discussed BPH as the likely cause of his urinary symptoms. We reviewed treatment options including observation, alpha blockers, 5-alpha reductase inhibitors, and bladder outlet surgery. The patient would like to try tamsulosin.     He also reports some difficulty with erections. He has tried sildenafil and tadalafil without benefit.     Plan:  Start tamsulosin 0.8 mg daily. Side effects reviewed.   Follow up in 2-3 months, sooner if concerns.     Naa Koehler PA-C  Department of Urology

## 2025-04-04 ENCOUNTER — TELEPHONE (OUTPATIENT)
Dept: FAMILY MEDICINE | Facility: CLINIC | Age: 65
End: 2025-04-04
Payer: COMMERCIAL

## 2025-04-04 ENCOUNTER — MYC MEDICAL ADVICE (OUTPATIENT)
Dept: FAMILY MEDICINE | Facility: CLINIC | Age: 65
End: 2025-04-04
Payer: COMMERCIAL

## 2025-04-04 DIAGNOSIS — N18.30 CONTROLLED TYPE 2 DIABETES MELLITUS WITH STAGE 3 CHRONIC KIDNEY DISEASE, WITHOUT LONG-TERM CURRENT USE OF INSULIN (H): Primary | ICD-10-CM

## 2025-04-04 DIAGNOSIS — E11.22 CONTROLLED TYPE 2 DIABETES MELLITUS WITH STAGE 3 CHRONIC KIDNEY DISEASE, WITHOUT LONG-TERM CURRENT USE OF INSULIN (H): Primary | ICD-10-CM

## 2025-04-04 NOTE — TELEPHONE ENCOUNTER
FYI - Status Update    Who is Calling: patient    Update: Freestyle Selina 3 is not covered with insurance and will need a new machine ordered per Patient and pharmacy    Has not received any medication from [facility] regarding the Monjouro medication and wasn't planning to take the Ozempic medication so he would not need it    Would like a call back today if possible to discuss the concerns    Does caller want a call/response back: Yes     Could we send this information to you in official.[facility]atham or would you prefer to receive a phone call?:   Patient would prefer a phone call   Okay to leave a detailed message?: Yes at Cell number on file:    Telephone Information:   Mobile [phone]

## 2025-04-07 DIAGNOSIS — E11.22 CONTROLLED TYPE 2 DIABETES MELLITUS WITH STAGE 3 CHRONIC KIDNEY DISEASE, WITHOUT LONG-TERM CURRENT USE OF INSULIN (H): ICD-10-CM

## 2025-04-07 DIAGNOSIS — E66.01 MORBID OBESITY (H): ICD-10-CM

## 2025-04-07 DIAGNOSIS — N18.30 CONTROLLED TYPE 2 DIABETES MELLITUS WITH STAGE 3 CHRONIC KIDNEY DISEASE, WITHOUT LONG-TERM CURRENT USE OF INSULIN (H): Primary | ICD-10-CM

## 2025-04-07 DIAGNOSIS — E11.22 CONTROLLED TYPE 2 DIABETES MELLITUS WITH STAGE 3 CHRONIC KIDNEY DISEASE, WITHOUT LONG-TERM CURRENT USE OF INSULIN (H): Primary | ICD-10-CM

## 2025-04-07 DIAGNOSIS — N18.30 CONTROLLED TYPE 2 DIABETES MELLITUS WITH STAGE 3 CHRONIC KIDNEY DISEASE, WITHOUT LONG-TERM CURRENT USE OF INSULIN (H): ICD-10-CM

## 2025-04-07 RX ORDER — ACYCLOVIR 400 MG/1
TABLET ORAL
Qty: 3 EACH | Refills: 5 | Status: CANCELLED | OUTPATIENT
Start: 2025-04-07

## 2025-04-07 RX ORDER — LANCETS
EACH MISCELLANEOUS
Qty: 100 EACH | Refills: 6 | Status: SHIPPED | OUTPATIENT
Start: 2025-04-07

## 2025-04-07 RX ORDER — ACYCLOVIR 400 MG/1
1 TABLET ORAL
Qty: 9 EACH | Refills: 5 | Status: SHIPPED | OUTPATIENT
Start: 2025-04-07

## 2025-04-07 RX ORDER — ACYCLOVIR 400 MG/1
1 TABLET ORAL ONCE
Qty: 1 EACH | Refills: 0 | Status: SHIPPED | OUTPATIENT
Start: 2025-04-07 | End: 2025-04-08

## 2025-04-07 RX ORDER — KETOROLAC TROMETHAMINE 30 MG/ML
1 INJECTION, SOLUTION INTRAMUSCULAR; INTRAVENOUS ONCE
Qty: 1 EACH | Refills: 0 | Status: SHIPPED | OUTPATIENT
Start: 2025-04-07 | End: 2025-04-08

## 2025-04-07 RX ORDER — HYDROCHLOROTHIAZIDE 12.5 MG/1
CAPSULE ORAL
Qty: 6 EACH | Refills: 3 | Status: SHIPPED | OUTPATIENT
Start: 2025-04-07 | End: 2025-04-08

## 2025-04-07 NOTE — TELEPHONE ENCOUNTER
Reason for Call:  Other     Detailed comments: Patient stated that freestyle is not covered and the Dexcom 7 is covered can a covering provider send a script to the pharmacy pended. Please advise when this has been completed    Phone Number Patient can be reached at: Home number on file [phone] (home)    Best Time:     Can we leave a detailed message on this number? Not Applicable    Call taken on 4/7/2025 at 1:06 PM by Blank Patel

## 2025-04-07 NOTE — TELEPHONE ENCOUNTER
Prescription sent to [facility] for glucometer.    Prescription for Mounjaro sent by Dr. Badillo on March 25, 2025 to [facility].  Needs prior authorization.    Joana GARGC

## 2025-04-07 NOTE — TELEPHONE ENCOUNTER
I also see [employer] messages from patient via 4/4/25 [employer] encounter.    I called [facility] and they did have the Monjaro Rx on file from you, so I agreed to it AND also for a NexGen 7 cgm device and sensors. I agreed to the starting dosage of 2.5ml and a 90 day supply. I also ordered the cgm, BUT you have to send an Rx for that to [facility] as well.       Routed back to Joana Schmitz to order NexGen7 CGM and sensors for patient.   I see a traditional glucometer was ordered today.    Mayela LEVY RN  [facility] Triage

## 2025-04-08 RX ORDER — ACYCLOVIR 400 MG/1
1 TABLET ORAL ONCE
Qty: 1 EACH | Refills: 0 | Status: SHIPPED | OUTPATIENT
Start: 2025-04-08 | End: 2025-04-08

## 2025-04-08 NOTE — TELEPHONE ENCOUNTER
Pt returned call, pt states he is taking Mounjaro through [facility].    He also needs the Dexcom G7, NOT Selina as this in not covered    He is not taking Ozempic that was sent to [facility]    Removed ozempic and Freestyle selina from [facility] - alternative therapies were already ordered.      Asked that patient call back if he has any issues with the scripts that were sent.      Taylor, RN    Triage Nurse  [facility]

## 2025-04-08 NOTE — TELEPHONE ENCOUNTER
Calling to clarify myChart messages.     RN left message to return call to clinic [phone].  (RN did not leave specific details on voicemail for confidential reasons)    Andreina Bustillos RN on 4/8/2025 at 10:02 AM

## 2025-04-08 NOTE — TELEPHONE ENCOUNTER
Routing to PA team      Dexcom G7 sensor   Dexcom G7     Payer Plan Sponsor Code Group Number Group Name   [employer] UNITED [employer] MEDICARE ADVANTAGE [id number]          JIMY Vazquez    Triage Nurse  [facility]

## 2025-04-09 ENCOUNTER — VIRTUAL VISIT (OUTPATIENT)
Dept: UROLOGY | Facility: CLINIC | Age: 65
End: 2025-04-09
Attending: FAMILY MEDICINE
Payer: COMMERCIAL

## 2025-04-09 ENCOUNTER — TELEPHONE (OUTPATIENT)
Dept: FAMILY MEDICINE | Facility: CLINIC | Age: 65
End: 2025-04-09

## 2025-04-09 DIAGNOSIS — R35.1 NOCTURIA: ICD-10-CM

## 2025-04-09 PROCEDURE — 98001 SYNCH AUDIO-VIDEO NEW LOW 30: CPT | Performed by: STUDENT IN AN ORGANIZED HEALTH CARE EDUCATION/TRAINING PROGRAM

## 2025-04-09 PROCEDURE — 1126F AMNT PAIN NOTED NONE PRSNT: CPT | Mod: 95 | Performed by: STUDENT IN AN ORGANIZED HEALTH CARE EDUCATION/TRAINING PROGRAM

## 2025-04-09 RX ORDER — TIRZEPATIDE 2.5 MG/.5ML
2.5 INJECTION, SOLUTION SUBCUTANEOUS WEEKLY
Qty: 7 ML | Refills: 0 | Status: SHIPPED | OUTPATIENT
Start: 2025-04-09

## 2025-04-09 RX ORDER — TAMSULOSIN HYDROCHLORIDE 0.4 MG/1
0.8 CAPSULE ORAL DAILY
Qty: 180 CAPSULE | Refills: 3 | Status: SHIPPED | OUTPATIENT
Start: 2025-04-09

## 2025-04-09 NOTE — TELEPHONE ENCOUNTER
PRIOR AUTHORIZATION DENIED    Medication: FREESTYLE LUDY 3 SENSOR [facility]  Insurance Company: [employer] Part D - Phone [phone] Fax [phone]  Denial Date: 4/9/2025  Denial Reason(s): Did not meet criteria and patient is not on insulin      Appeal Information: [id number]    Patient Notified: No

## 2025-04-09 NOTE — PROGRESS NOTES
Bi Bishop is a 64 year old year old who is being evaluated via a billable video visit.      How would you like to obtain your AVS? MyChart  If the video visit is dropped, the invitation should be resent by: Text to cell phone: [phone]  Will anyone else be joining your video visit? No    Video-Visit Details    Type of service:  Video Visit     Originating Location (pt. Location): Home    Distant Location (provider location):  Off-site  [facility]

## 2025-04-14 DIAGNOSIS — N18.30 CONTROLLED TYPE 2 DIABETES MELLITUS WITH STAGE 3 CHRONIC KIDNEY DISEASE, WITHOUT LONG-TERM CURRENT USE OF INSULIN (H): ICD-10-CM

## 2025-04-14 DIAGNOSIS — E66.01 MORBID OBESITY (H): ICD-10-CM

## 2025-04-14 DIAGNOSIS — E11.22 CONTROLLED TYPE 2 DIABETES MELLITUS WITH STAGE 3 CHRONIC KIDNEY DISEASE, WITHOUT LONG-TERM CURRENT USE OF INSULIN (H): ICD-10-CM

## 2025-04-14 RX ORDER — TIRZEPATIDE 2.5 MG/.5ML
2.5 INJECTION, SOLUTION SUBCUTANEOUS WEEKLY
Qty: 7 ML | Refills: 0 | Status: CANCELLED | OUTPATIENT
Start: 2025-04-14

## 2025-04-14 NOTE — TELEPHONE ENCOUNTER
I see tirzepatide has been at 2.5 mg since it was initiated 3/25/25.    No mention of plan to increase the dose, patient was advised to follow up in 3 months.    I called and spoke to patient, he says he has not yet started the mounjaro.   Says he did not yet receive the Rx from [facility].    He also did not get his Dexcom G7 CGM yet.    Says he got a message from [facility] saying the clinic needs to call [facility] to provide more information on his Rx's.        [id number] is the order number in question.    [id number] is another order number also in question.    He says it seems something is being shipped to him and is to cost him $300 plus.   He wonders what that could be?    Flagged for RN team to call [facility].    Mayela LEVY RN  [facility] Triage

## 2025-04-14 NOTE — TELEPHONE ENCOUNTER
PLEASE CLARIFY DIRECTIONS MOUNJARO PEN 2.5MG IS TYPICALLY DOSED INITALLY AT 2.5MG WEEKLY FOR 4 WEEKS , THEN INCREASED TO 5MG WEEKLY. PLEASE CLARIFY.  [facility]

## 2025-04-14 NOTE — TELEPHONE ENCOUNTER
"I called and spoke to pharmacist, the concern is that the Rx sent in March and recently was for a 3 month supply.    Usually the med is titrated up after a month.    They will change the Rx on file to be a one month supply and then the provider can decide what to do the next month.    Says she \"ran the claim\" and the  copay is over $300.   Patient will need to call to verify he wants to proceed with this.   She says she has \"limited access\" and can't tell if he has a high deductible or why this is costing so much.    It is covered, does not require PA.        Regarding the Dexcom G7, the PA for both the  and sensors was denied.     She says nothing has been shipped from [facility] recently, patient is brand new  to them.        I called patient back and advised him of the above.   He will call insurance again to see if his cost will go down for the mounjaro in the future or if there is a different med preferred.    He says insurance told him the Dexcom would be covered.   He will ask about that as [facility] says the PA was denied.    He will call us back with his findings after talking to insurance.    Mayela LEVY RN  [facility] Triage        "

## 2025-04-15 ENCOUNTER — MYC MEDICAL ADVICE (OUTPATIENT)
Dept: FAMILY MEDICINE | Facility: CLINIC | Age: 65
End: 2025-04-15
Payer: COMMERCIAL

## 2025-04-15 DIAGNOSIS — E11.9 CONTROLLED TYPE 2 DIABETES MELLITUS WITHOUT COMPLICATION, WITHOUT LONG-TERM CURRENT USE OF INSULIN (H): Primary | ICD-10-CM

## 2025-04-24 RX ORDER — PROCHLORPERAZINE 25 MG/1
1 SUPPOSITORY RECTAL
Qty: 9 EACH | Refills: 5 | Status: SHIPPED | OUTPATIENT
Start: 2025-04-24

## 2025-04-24 RX ORDER — PROCHLORPERAZINE 25 MG/1
1 SUPPOSITORY RECTAL ONCE
Qty: 1 EACH | Refills: 0 | Status: SHIPPED | OUTPATIENT
Start: 2025-04-24 | End: 2025-04-24

## 2025-04-24 RX ORDER — PROCHLORPERAZINE 25 MG/1
1 SUPPOSITORY RECTAL
Qty: 1 EACH | Refills: 1 | Status: SHIPPED | OUTPATIENT
Start: 2025-04-24

## 2025-05-08 DIAGNOSIS — E78.5 HYPERLIPIDEMIA LDL GOAL <100: ICD-10-CM

## 2025-05-08 DIAGNOSIS — E66.01 MORBID OBESITY (H): ICD-10-CM

## 2025-05-08 DIAGNOSIS — E11.9 CONTROLLED TYPE 2 DIABETES MELLITUS WITHOUT COMPLICATION, WITHOUT LONG-TERM CURRENT USE OF INSULIN (H): ICD-10-CM

## 2025-05-08 DIAGNOSIS — E11.22 CONTROLLED TYPE 2 DIABETES MELLITUS WITH STAGE 3 CHRONIC KIDNEY DISEASE, WITHOUT LONG-TERM CURRENT USE OF INSULIN (H): ICD-10-CM

## 2025-05-08 DIAGNOSIS — I10 HYPERTENSION, BENIGN ESSENTIAL, GOAL BELOW 140/90: ICD-10-CM

## 2025-05-08 DIAGNOSIS — N18.30 CONTROLLED TYPE 2 DIABETES MELLITUS WITH STAGE 3 CHRONIC KIDNEY DISEASE, WITHOUT LONG-TERM CURRENT USE OF INSULIN (H): ICD-10-CM

## 2025-05-08 RX ORDER — AMLODIPINE BESYLATE 10 MG/1
10 TABLET ORAL DAILY
Qty: 90 TABLET | Refills: 1 | Status: SHIPPED | OUTPATIENT
Start: 2025-05-08

## 2025-05-08 RX ORDER — LOSARTAN POTASSIUM 100 MG/1
100 TABLET ORAL DAILY
Qty: 90 TABLET | Refills: 1 | Status: SHIPPED | OUTPATIENT
Start: 2025-05-08

## 2025-05-08 RX ORDER — SIMVASTATIN 20 MG
TABLET ORAL
Qty: 90 TABLET | Refills: 1 | Status: SHIPPED | OUTPATIENT
Start: 2025-05-08

## 2025-05-08 RX ORDER — METFORMIN HYDROCHLORIDE 500 MG/1
1000 TABLET, EXTENDED RELEASE ORAL 2 TIMES DAILY WITH MEALS
Qty: 360 TABLET | Refills: 1 | Status: SHIPPED | OUTPATIENT
Start: 2025-05-08

## 2025-05-08 NOTE — TELEPHONE ENCOUNTER
Patient requesting the increased dose be sent in to mail order pharmacy. Cued for provider review.     Thank you,  Tania Aguilar RN

## 2025-05-08 NOTE — TELEPHONE ENCOUNTER
Called patient, he stated he has been taking him medications with no gaps and is not sure why the chart shows this. He is in need of refills.     Tania Aguilar RN

## 2025-05-08 NOTE — TELEPHONE ENCOUNTER
Metoprolol succinate ER (TOPROL XL) 50 MG 24 hr tablet   Last ordered on 5/20/22 for 90 tablets/0 refills.     Please call patient to discuss this.     Forward to Dr. Badillo for review.     Felecia Cary RN BSN  [facility]

## 2025-05-13 ENCOUNTER — TELEPHONE (OUTPATIENT)
Dept: FAMILY MEDICINE | Facility: CLINIC | Age: 65
End: 2025-05-13
Payer: COMMERCIAL

## 2025-05-13 NOTE — TELEPHONE ENCOUNTER
Prior Authorization Retail Medication Request    Medication/Dose: tirzepatide weight management (ZEPBOUND) 5 mg/0.5 ml  Diagnosis and ICD code (if different than what is on RX):    Controlled type 2 diabetes mellitus with stage 3 chronic kidney disease, without long-term current use of insulin (H) [E11.22, N18.30]      Morbid obesity (H) [E66.01]        New/renewal/insurance change PA/secondary ins. PA:  Previously Tried and Failed:    Rationale:      Insurance [facility]   Primary:   Insurance ID:  [id number]     Secondary (if applicable):  Insurance ID:      Pharmacy Information (if different than what is on RX)  Name:  [facility]  Phone:  [phone]  Fax:[phone]    Clinic Information  Preferred routing pool for dept communication:

## 2025-05-15 NOTE — TELEPHONE ENCOUNTER
Note: Due to record-high volumes, our turn-around time is taking longer than usual . We are currently 3  business days behind in the pools.   We are working diligently to submit all requests in a timely manner and in the order they are received. Please only flag TRUE URGENT requests as high priority to the pool at this time.   If you have questions on status of PA's,  please send a note/message in the active PA encounter and send back to the [facility] PA pool [[id number]].    If you have questions about the turn-around time or about our process, please reach out to our supervisor Tania English.   Thank you!   RPPA (Retail Pharmacy Prior Authorization) team    Central Prior Authorization Team   Phone: [phone]    PA Initiation    Medication: tirzepatide-Weight Management (ZEPBOUND) 5 MG/0.5ML prefilled pen  Insurance Company: [employer] Part D - Phone [phone] Fax [phone]  Pharmacy Filling the Rx: [facility] - [address]  Filling Pharmacy Phone: [phone]  Filling Pharmacy Fax:    Start Date: 5/15/2025

## 2025-05-19 NOTE — TELEPHONE ENCOUNTER
PRIOR AUTHORIZATION DENIED    Medication: tirzepatide-Weight Management (ZEPBOUND) 5 MG/0.5ML prefilled pen-PA DENIED     Denial Date: 5/15/2025    Denial Rational:           Appeal Information:

## 2025-05-20 ENCOUNTER — MYC MEDICAL ADVICE (OUTPATIENT)
Dept: FAMILY MEDICINE | Facility: CLINIC | Age: 65
End: 2025-05-20
Payer: COMMERCIAL

## 2025-05-20 DIAGNOSIS — E11.9 CONTROLLED TYPE 2 DIABETES MELLITUS WITHOUT COMPLICATION, WITHOUT LONG-TERM CURRENT USE OF INSULIN (H): Primary | ICD-10-CM

## 2025-05-21 NOTE — TELEPHONE ENCOUNTER
I believe this has been addressed. The patient contacted me through [employer] and the Rx needed to send for Mounjaro and not Zepbound. The Rx was sent to [facility]. Hopefully, it will be covered. No need for a PA.

## 2025-07-18 DIAGNOSIS — N18.30 CONTROLLED TYPE 2 DIABETES MELLITUS WITH STAGE 3 CHRONIC KIDNEY DISEASE, WITHOUT LONG-TERM CURRENT USE OF INSULIN (H): ICD-10-CM

## 2025-07-18 DIAGNOSIS — E11.22 CONTROLLED TYPE 2 DIABETES MELLITUS WITH STAGE 3 CHRONIC KIDNEY DISEASE, WITHOUT LONG-TERM CURRENT USE OF INSULIN (H): ICD-10-CM

## 2025-07-18 NOTE — TELEPHONE ENCOUNTER
New Medication Request        What medication are you requesting?: Accu-check guide meter and test strips     Reason for medication request: Diabetes- Patient stated Medicare will no longer cover One Touch after August 3rd.     Have you taken this medication before?: No    Controlled Substance Agreement on file:   CSA -- Patient Level:    CSA: None found at the patient level.         Patient offered an appointment? No    Preferred Pharmacy:   [id number] - [place]  [address]  [address]  [place] [address]  Phone: [phone] Fax: [phone]    WRITTEN PRESCRIPTION REQUESTED  No address on file        Could we send this information to you in [employer] or would you prefer to receive a phone call?:   Patient would prefer a phone call   Okay to leave a detailed message?: Yes at Home number on file [phone] (home)

## 2025-07-31 DIAGNOSIS — E11.22 CONTROLLED TYPE 2 DIABETES MELLITUS WITH STAGE 3 CHRONIC KIDNEY DISEASE, WITHOUT LONG-TERM CURRENT USE OF INSULIN (H): Primary | ICD-10-CM

## 2025-07-31 DIAGNOSIS — N18.30 CONTROLLED TYPE 2 DIABETES MELLITUS WITH STAGE 3 CHRONIC KIDNEY DISEASE, WITHOUT LONG-TERM CURRENT USE OF INSULIN (H): Primary | ICD-10-CM

## 2025-07-31 NOTE — TELEPHONE ENCOUNTER
Patient now has ACCU CHECK GUIDE ME TEST STRIPS and a GLUCOSE METER.  Can you send a prescription for LANCETS so he can get SOFTCLIX LANCETS TO GO WITH HIS METER AND STRIPS?

## 2025-08-04 ENCOUNTER — TELEPHONE (OUTPATIENT)
Dept: FAMILY MEDICINE | Facility: CLINIC | Age: 65
End: 2025-08-04
Payer: COMMERCIAL

## 2025-08-04 DIAGNOSIS — E11.22 CONTROLLED TYPE 2 DIABETES MELLITUS WITH STAGE 3 CHRONIC KIDNEY DISEASE, WITHOUT LONG-TERM CURRENT USE OF INSULIN (H): Primary | ICD-10-CM

## 2025-08-04 DIAGNOSIS — N18.30 CONTROLLED TYPE 2 DIABETES MELLITUS WITH STAGE 3 CHRONIC KIDNEY DISEASE, WITHOUT LONG-TERM CURRENT USE OF INSULIN (H): Primary | ICD-10-CM

## 2025-08-12 ENCOUNTER — MYC MEDICAL ADVICE (OUTPATIENT)
Dept: FAMILY MEDICINE | Facility: CLINIC | Age: 65
End: 2025-08-12
Payer: COMMERCIAL

## 2025-08-12 DIAGNOSIS — M25.562 BILATERAL CHRONIC KNEE PAIN: Primary | ICD-10-CM

## 2025-08-12 DIAGNOSIS — M25.561 BILATERAL CHRONIC KNEE PAIN: Primary | ICD-10-CM

## 2025-08-12 DIAGNOSIS — G89.29 BILATERAL CHRONIC KNEE PAIN: Primary | ICD-10-CM

## 2025-08-18 RX ORDER — LANCETS
EACH MISCELLANEOUS
Qty: 100 EACH | Refills: 6 | Status: SHIPPED | OUTPATIENT
Start: 2025-08-18

## 2025-08-19 DIAGNOSIS — E11.22 CONTROLLED TYPE 2 DIABETES MELLITUS WITH STAGE 3 CHRONIC KIDNEY DISEASE, WITHOUT LONG-TERM CURRENT USE OF INSULIN (H): ICD-10-CM

## 2025-08-19 DIAGNOSIS — N18.30 CONTROLLED TYPE 2 DIABETES MELLITUS WITH STAGE 3 CHRONIC KIDNEY DISEASE, WITHOUT LONG-TERM CURRENT USE OF INSULIN (H): ICD-10-CM

## 2025-08-26 DIAGNOSIS — J45.40 MODERATE PERSISTENT ASTHMA WITHOUT COMPLICATION: ICD-10-CM

## 2025-08-26 RX ORDER — ALBUTEROL SULFATE 90 UG/1
INHALANT RESPIRATORY (INHALATION)
Qty: 13.4 G | Refills: 1 | Status: SHIPPED | OUTPATIENT
Start: 2025-08-26

## (undated) DEVICE — GLOVE PROTEXIS W/NEU-THERA 7.0  2D73TE70

## (undated) DEVICE — ENDO FORCEP ENDOJAW BIOPSY 2.8MMX230CM FB-220U

## (undated) DEVICE — DEVICE ENDO CLIP INSTINCT PLUS INSC-P-7-230-S  G58010

## (undated) DEVICE — GUIDEWIRE SENSOR DUAL FLEX STR 0.035"X150CM M0066703080

## (undated) DEVICE — SOL WATER IRRIG 1000ML BOTTLE 07139-09

## (undated) DEVICE — SOL WATER INJ 2000ML BAG 07118-07

## (undated) DEVICE — Device

## (undated) DEVICE — ENDO SNARE EXACTO COLD 9MM LOOP 2.4MMX230CM 00711115

## (undated) DEVICE — GOWN XLG DISP 9545

## (undated) RX ORDER — FENTANYL CITRATE-0.9 % NACL/PF 10 MCG/ML
PLASTIC BAG, INJECTION (ML) INTRAVENOUS
Status: DISPENSED
Start: 2021-12-16

## (undated) RX ORDER — FENTANYL CITRATE 50 UG/ML
INJECTION, SOLUTION INTRAMUSCULAR; INTRAVENOUS
Status: DISPENSED
Start: 2021-12-16

## (undated) RX ORDER — PHENYLEPHRINE HYDROCHLORIDE 10 MG/ML
INJECTION INTRAVENOUS
Status: DISPENSED
Start: 2021-12-16

## (undated) RX ORDER — PROPOFOL 10 MG/ML
INJECTION, EMULSION INTRAVENOUS
Status: DISPENSED
Start: 2024-04-18

## (undated) RX ORDER — ONDANSETRON 2 MG/ML
INJECTION INTRAMUSCULAR; INTRAVENOUS
Status: DISPENSED
Start: 2021-12-16

## (undated) RX ORDER — CEFAZOLIN SODIUM 1 G/3ML
INJECTION, POWDER, FOR SOLUTION INTRAMUSCULAR; INTRAVENOUS
Status: DISPENSED
Start: 2021-12-16

## (undated) RX ORDER — ACETAMINOPHEN 325 MG/1
TABLET ORAL
Status: DISPENSED
Start: 2021-12-16